# Patient Record
Sex: FEMALE | Race: WHITE | NOT HISPANIC OR LATINO | Employment: OTHER | URBAN - METROPOLITAN AREA
[De-identification: names, ages, dates, MRNs, and addresses within clinical notes are randomized per-mention and may not be internally consistent; named-entity substitution may affect disease eponyms.]

---

## 2021-01-01 ENCOUNTER — APPOINTMENT (OUTPATIENT)
Dept: GENERAL RADIOLOGY | Facility: HOSPITAL | Age: 56
End: 2021-01-01

## 2021-01-01 ENCOUNTER — HOSPITAL ENCOUNTER (INPATIENT)
Facility: HOSPITAL | Age: 56
LOS: 8 days | End: 2021-08-15
Attending: INTERNAL MEDICINE | Admitting: INTERNAL MEDICINE

## 2021-01-01 ENCOUNTER — APPOINTMENT (OUTPATIENT)
Dept: MRI IMAGING | Facility: HOSPITAL | Age: 56
End: 2021-01-01

## 2021-01-01 VITALS
WEIGHT: 145.1 LBS | HEIGHT: 65 IN | TEMPERATURE: 98.3 F | HEART RATE: 112 BPM | DIASTOLIC BLOOD PRESSURE: 72 MMHG | SYSTOLIC BLOOD PRESSURE: 133 MMHG | BODY MASS INDEX: 24.18 KG/M2 | RESPIRATION RATE: 20 BRPM | OXYGEN SATURATION: 94 %

## 2021-01-01 LAB
ALBUMIN SERPL-MCNC: 2.4 G/DL (ref 3.5–5.2)
ALBUMIN SERPL-MCNC: 2.6 G/DL (ref 3.5–5.2)
ALBUMIN SERPL-MCNC: 2.8 G/DL (ref 3.5–5.2)
ALBUMIN/GLOB SERPL: 0.9 G/DL
ALBUMIN/GLOB SERPL: 0.9 G/DL
ALP SERPL-CCNC: 67 U/L (ref 39–117)
ALP SERPL-CCNC: 68 U/L (ref 39–117)
ALT SERPL W P-5'-P-CCNC: 13 U/L (ref 1–33)
ALT SERPL W P-5'-P-CCNC: 20 U/L (ref 1–33)
AMPHET+METHAMPHET UR QL: NEGATIVE
AMPHETAMINES UR QL: NEGATIVE
ANION GAP SERPL CALCULATED.3IONS-SCNC: 0.7 MMOL/L (ref 5–15)
ANION GAP SERPL CALCULATED.3IONS-SCNC: 4 MMOL/L (ref 5–15)
ANION GAP SERPL CALCULATED.3IONS-SCNC: 4.1 MMOL/L (ref 5–15)
ANION GAP SERPL CALCULATED.3IONS-SCNC: 4.2 MMOL/L (ref 5–15)
ANION GAP SERPL CALCULATED.3IONS-SCNC: 4.2 MMOL/L (ref 5–15)
ARTERIAL PATENCY WRIST A: ABNORMAL
ARTERIAL PATENCY WRIST A: ABNORMAL
ARTERIAL PATENCY WRIST A: POSITIVE
AST SERPL-CCNC: 20 U/L (ref 1–32)
AST SERPL-CCNC: 41 U/L (ref 1–32)
ATMOSPHERIC PRESS: 739 MMHG
ATMOSPHERIC PRESS: 740 MMHG
ATMOSPHERIC PRESS: 741 MMHG
ATMOSPHERIC PRESS: 743 MMHG
B PARAPERT DNA SPEC QL NAA+PROBE: NOT DETECTED
B PERT DNA SPEC QL NAA+PROBE: NOT DETECTED
BACTERIA SPEC AEROBE CULT: NORMAL
BACTERIA SPEC AEROBE CULT: NORMAL
BACTERIA SPEC RESP CULT: NORMAL
BACTERIA SPEC RESP CULT: NORMAL
BARBITURATES UR QL SCN: NEGATIVE
BASE EXCESS BLDA CALC-SCNC: 12.7 MMOL/L (ref 0–2)
BASE EXCESS BLDA CALC-SCNC: 15.3 MMOL/L (ref 0–2)
BASE EXCESS BLDA CALC-SCNC: 5.9 MMOL/L (ref 0–2)
BASE EXCESS BLDA CALC-SCNC: 9.2 MMOL/L (ref 0–2)
BASE EXCESS BLDA CALC-SCNC: ABNORMAL MMOL/L
BASE EXCESS BLDV CALC-SCNC: 14.5 MMOL/L (ref 0–2)
BASOPHILS # BLD AUTO: 0.01 10*3/MM3 (ref 0–0.2)
BASOPHILS # BLD AUTO: 0.01 10*3/MM3 (ref 0–0.2)
BASOPHILS # BLD AUTO: 0.02 10*3/MM3 (ref 0–0.2)
BASOPHILS # BLD AUTO: 0.02 10*3/MM3 (ref 0–0.2)
BASOPHILS NFR BLD AUTO: 0.1 % (ref 0–1.5)
BASOPHILS NFR BLD AUTO: 0.1 % (ref 0–1.5)
BASOPHILS NFR BLD AUTO: 0.2 % (ref 0–1.5)
BASOPHILS NFR BLD AUTO: 0.2 % (ref 0–1.5)
BDY SITE: ABNORMAL
BENZODIAZ UR QL SCN: NEGATIVE
BILIRUB SERPL-MCNC: 0.2 MG/DL (ref 0–1.2)
BILIRUB SERPL-MCNC: 0.6 MG/DL (ref 0–1.2)
BODY TEMPERATURE: 37 C
BUN SERPL-MCNC: 10 MG/DL (ref 6–20)
BUN SERPL-MCNC: 13 MG/DL (ref 6–20)
BUN SERPL-MCNC: 20 MG/DL (ref 6–20)
BUN SERPL-MCNC: 23 MG/DL (ref 6–20)
BUN SERPL-MCNC: 7 MG/DL (ref 6–20)
BUN/CREAT SERPL: 16.7 (ref 7–25)
BUN/CREAT SERPL: 25 (ref 7–25)
BUN/CREAT SERPL: 25 (ref 7–25)
BUN/CREAT SERPL: 32.3 (ref 7–25)
BUN/CREAT SERPL: 32.4 (ref 7–25)
BUPRENORPHINE SERPL-MCNC: NEGATIVE NG/ML
C PNEUM DNA NPH QL NAA+NON-PROBE: NOT DETECTED
CALCIUM SPEC-SCNC: 7.7 MG/DL (ref 8.6–10.5)
CALCIUM SPEC-SCNC: 8.2 MG/DL (ref 8.6–10.5)
CALCIUM SPEC-SCNC: 8.4 MG/DL (ref 8.6–10.5)
CALCIUM SPEC-SCNC: 8.4 MG/DL (ref 8.6–10.5)
CALCIUM SPEC-SCNC: 8.6 MG/DL (ref 8.6–10.5)
CANNABINOIDS SERPL QL: NEGATIVE
CHLORIDE SERPL-SCNC: 100 MMOL/L (ref 98–107)
CHLORIDE SERPL-SCNC: 94 MMOL/L (ref 98–107)
CHLORIDE SERPL-SCNC: 94 MMOL/L (ref 98–107)
CHLORIDE SERPL-SCNC: 98 MMOL/L (ref 98–107)
CHLORIDE SERPL-SCNC: 99 MMOL/L (ref 98–107)
CO2 SERPL-SCNC: 31.8 MMOL/L (ref 22–29)
CO2 SERPL-SCNC: 33 MMOL/L (ref 22–29)
CO2 SERPL-SCNC: 34.8 MMOL/L (ref 22–29)
CO2 SERPL-SCNC: 38.9 MMOL/L (ref 22–29)
CO2 SERPL-SCNC: 40.3 MMOL/L (ref 22–29)
COCAINE UR QL: NEGATIVE
CREAT SERPL-MCNC: 0.4 MG/DL (ref 0.57–1)
CREAT SERPL-MCNC: 0.42 MG/DL (ref 0.57–1)
CREAT SERPL-MCNC: 0.52 MG/DL (ref 0.57–1)
CREAT SERPL-MCNC: 0.62 MG/DL (ref 0.57–1)
CREAT SERPL-MCNC: 0.71 MG/DL (ref 0.57–1)
DEPRECATED RDW RBC AUTO: 46.5 FL (ref 37–54)
DEPRECATED RDW RBC AUTO: 48.9 FL (ref 37–54)
DEPRECATED RDW RBC AUTO: 49.1 FL (ref 37–54)
DEPRECATED RDW RBC AUTO: 50.9 FL (ref 37–54)
EOSINOPHIL # BLD AUTO: 0 10*3/MM3 (ref 0–0.4)
EOSINOPHIL # BLD AUTO: 0.03 10*3/MM3 (ref 0–0.4)
EOSINOPHIL NFR BLD AUTO: 0 % (ref 0.3–6.2)
EOSINOPHIL NFR BLD AUTO: 0.3 % (ref 0.3–6.2)
ERYTHROCYTE [DISTWIDTH] IN BLOOD BY AUTOMATED COUNT: 12.8 % (ref 12.3–15.4)
ERYTHROCYTE [DISTWIDTH] IN BLOOD BY AUTOMATED COUNT: 12.9 % (ref 12.3–15.4)
ERYTHROCYTE [DISTWIDTH] IN BLOOD BY AUTOMATED COUNT: 13.2 % (ref 12.3–15.4)
ERYTHROCYTE [DISTWIDTH] IN BLOOD BY AUTOMATED COUNT: 13.2 % (ref 12.3–15.4)
FLUAV SUBTYP SPEC NAA+PROBE: NOT DETECTED
FLUBV RNA ISLT QL NAA+PROBE: NOT DETECTED
GAS FLOW AIRWAY: 1 LPM
GFR SERPL CREATININE-BSD FRML MDRD: 100 ML/MIN/1.73
GFR SERPL CREATININE-BSD FRML MDRD: 122 ML/MIN/1.73
GFR SERPL CREATININE-BSD FRML MDRD: 85 ML/MIN/1.73
GFR SERPL CREATININE-BSD FRML MDRD: >150 ML/MIN/1.73
GFR SERPL CREATININE-BSD FRML MDRD: >150 ML/MIN/1.73
GLOBULIN UR ELPH-MCNC: 2.9 GM/DL
GLOBULIN UR ELPH-MCNC: 3.1 GM/DL
GLUCOSE BLDC GLUCOMTR-MCNC: 139 MG/DL (ref 70–130)
GLUCOSE SERPL-MCNC: 100 MG/DL (ref 65–99)
GLUCOSE SERPL-MCNC: 106 MG/DL (ref 65–99)
GLUCOSE SERPL-MCNC: 133 MG/DL (ref 65–99)
GLUCOSE SERPL-MCNC: 150 MG/DL (ref 65–99)
GLUCOSE SERPL-MCNC: 99 MG/DL (ref 65–99)
GRAM STN SPEC: NORMAL
HADV DNA SPEC NAA+PROBE: NOT DETECTED
HBA1C MFR BLD: 5.7 % (ref 4.8–5.6)
HCO3 BLDA-SCNC: 33.9 MMOL/L (ref 20–26)
HCO3 BLDA-SCNC: 35.5 MMOL/L (ref 20–26)
HCO3 BLDA-SCNC: 40.5 MMOL/L (ref 20–26)
HCO3 BLDA-SCNC: 44 MMOL/L (ref 20–26)
HCO3 BLDA-SCNC: 45.7 MMOL/L (ref 20–26)
HCO3 BLDV-SCNC: 43.4 MMOL/L (ref 22–28)
HCOV 229E RNA SPEC QL NAA+PROBE: NOT DETECTED
HCOV HKU1 RNA SPEC QL NAA+PROBE: NOT DETECTED
HCOV NL63 RNA SPEC QL NAA+PROBE: NOT DETECTED
HCOV OC43 RNA SPEC QL NAA+PROBE: NOT DETECTED
HCT VFR BLD AUTO: 41.9 % (ref 34–46.6)
HCT VFR BLD AUTO: 42.9 % (ref 34–46.6)
HCT VFR BLD AUTO: 43.4 % (ref 34–46.6)
HCT VFR BLD AUTO: 47.2 % (ref 34–46.6)
HGB BLD-MCNC: 13 G/DL (ref 12–15.9)
HGB BLD-MCNC: 13.3 G/DL (ref 12–15.9)
HGB BLD-MCNC: 13.7 G/DL (ref 12–15.9)
HGB BLD-MCNC: 14.6 G/DL (ref 12–15.9)
HGB BLDA-MCNC: 12.7 G/DL (ref 13.5–17.5)
HGB BLDA-MCNC: 13.8 G/DL (ref 13.5–17.5)
HGB BLDA-MCNC: 13.9 G/DL (ref 13.5–17.5)
HGB BLDA-MCNC: 14.4 G/DL (ref 13.5–17.5)
HGB BLDA-MCNC: 14.6 G/DL (ref 13.5–17.5)
HGB BLDA-MCNC: ABNORMAL G/DL
HMPV RNA NPH QL NAA+NON-PROBE: NOT DETECTED
HPIV1 RNA SPEC QL NAA+PROBE: NOT DETECTED
HPIV2 RNA SPEC QL NAA+PROBE: NOT DETECTED
HPIV3 RNA NPH QL NAA+PROBE: NOT DETECTED
HPIV4 P GENE NPH QL NAA+PROBE: NOT DETECTED
IMM GRANULOCYTES # BLD AUTO: 0.04 10*3/MM3 (ref 0–0.05)
IMM GRANULOCYTES # BLD AUTO: 0.07 10*3/MM3 (ref 0–0.05)
IMM GRANULOCYTES # BLD AUTO: 0.07 10*3/MM3 (ref 0–0.05)
IMM GRANULOCYTES # BLD AUTO: 0.08 10*3/MM3 (ref 0–0.05)
IMM GRANULOCYTES NFR BLD AUTO: 0.3 % (ref 0–0.5)
IMM GRANULOCYTES NFR BLD AUTO: 0.6 % (ref 0–0.5)
IMM GRANULOCYTES NFR BLD AUTO: 0.6 % (ref 0–0.5)
IMM GRANULOCYTES NFR BLD AUTO: 0.8 % (ref 0–0.5)
INHALED O2 CONCENTRATION: 40 %
INHALED O2 CONCENTRATION: 45 %
INHALED O2 CONCENTRATION: 50 %
L PNEUMO1 AG UR QL IA: NEGATIVE
LYMPHOCYTES # BLD AUTO: 0.96 10*3/MM3 (ref 0.7–3.1)
LYMPHOCYTES # BLD AUTO: 0.96 10*3/MM3 (ref 0.7–3.1)
LYMPHOCYTES # BLD AUTO: 0.98 10*3/MM3 (ref 0.7–3.1)
LYMPHOCYTES # BLD AUTO: 1.16 10*3/MM3 (ref 0.7–3.1)
LYMPHOCYTES NFR BLD AUTO: 10.4 % (ref 19.6–45.3)
LYMPHOCYTES NFR BLD AUTO: 7.3 % (ref 19.6–45.3)
LYMPHOCYTES NFR BLD AUTO: 8.1 % (ref 19.6–45.3)
LYMPHOCYTES NFR BLD AUTO: 9.5 % (ref 19.6–45.3)
Lab: ABNORMAL
M PNEUMO IGG SER IA-ACNC: NOT DETECTED
MACROCYTES BLD QL SMEAR: NORMAL
MAGNESIUM SERPL-MCNC: 1.9 MG/DL (ref 1.6–2.6)
MAGNESIUM SERPL-MCNC: 2 MG/DL (ref 1.6–2.6)
MAGNESIUM SERPL-MCNC: 2 MG/DL (ref 1.6–2.6)
MCH RBC QN AUTO: 31.3 PG (ref 26.6–33)
MCH RBC QN AUTO: 31.7 PG (ref 26.6–33)
MCH RBC QN AUTO: 31.9 PG (ref 26.6–33)
MCH RBC QN AUTO: 31.9 PG (ref 26.6–33)
MCHC RBC AUTO-ENTMCNC: 30.3 G/DL (ref 31.5–35.7)
MCHC RBC AUTO-ENTMCNC: 30.9 G/DL (ref 31.5–35.7)
MCHC RBC AUTO-ENTMCNC: 31.6 G/DL (ref 31.5–35.7)
MCHC RBC AUTO-ENTMCNC: 31.7 G/DL (ref 31.5–35.7)
MCV RBC AUTO: 100.9 FL (ref 79–97)
MCV RBC AUTO: 101.1 FL (ref 79–97)
MCV RBC AUTO: 105.1 FL (ref 79–97)
MCV RBC AUTO: 99.8 FL (ref 79–97)
METHADONE UR QL SCN: NEGATIVE
MODALITY: ABNORMAL
MONOCYTES # BLD AUTO: 0.71 10*3/MM3 (ref 0.1–0.9)
MONOCYTES # BLD AUTO: 0.73 10*3/MM3 (ref 0.1–0.9)
MONOCYTES # BLD AUTO: 0.76 10*3/MM3 (ref 0.1–0.9)
MONOCYTES # BLD AUTO: 0.9 10*3/MM3 (ref 0.1–0.9)
MONOCYTES NFR BLD AUTO: 5.7 % (ref 5–12)
MONOCYTES NFR BLD AUTO: 6 % (ref 5–12)
MONOCYTES NFR BLD AUTO: 7.6 % (ref 5–12)
MONOCYTES NFR BLD AUTO: 7.7 % (ref 5–12)
MRSA SPEC QL CULT: NORMAL
NEUTROPHILS NFR BLD AUTO: 10.19 10*3/MM3 (ref 1.7–7)
NEUTROPHILS NFR BLD AUTO: 11.57 10*3/MM3 (ref 1.7–7)
NEUTROPHILS NFR BLD AUTO: 7.48 10*3/MM3 (ref 1.7–7)
NEUTROPHILS NFR BLD AUTO: 80.6 % (ref 42.7–76)
NEUTROPHILS NFR BLD AUTO: 83.8 % (ref 42.7–76)
NEUTROPHILS NFR BLD AUTO: 83.8 % (ref 42.7–76)
NEUTROPHILS NFR BLD AUTO: 86.3 % (ref 42.7–76)
NEUTROPHILS NFR BLD AUTO: 9.89 10*3/MM3 (ref 1.7–7)
NOTIFIED BY: ABNORMAL
NOTIFIED WHO: ABNORMAL
NRBC BLD AUTO-RTO: 0 /100 WBC (ref 0–0.2)
NT-PROBNP SERPL-MCNC: 228.2 PG/ML (ref 0–900)
OPIATES UR QL: NEGATIVE
OXYCODONE UR QL SCN: NEGATIVE
PCO2 BLDA: 59.2 MM HG (ref 35–45)
PCO2 BLDA: 74.4 MM HG (ref 35–45)
PCO2 BLDA: 75.2 MM HG (ref 35–45)
PCO2 BLDA: 94 MM HG (ref 35–45)
PCO2 BLDA: >102 MM HG (ref 35–45)
PCO2 BLDV: 73.8 MM HG (ref 41–51)
PCO2 TEMP ADJ BLD: 59.2 MM HG (ref 35–45)
PCO2 TEMP ADJ BLD: 74.4 MM HG (ref 35–45)
PCO2 TEMP ADJ BLD: 75.2 MM HG (ref 35–45)
PCO2 TEMP ADJ BLD: 94 MM HG (ref 35–45)
PCO2 TEMP ADJ BLD: >102 MM HG (ref 35–45)
PCP UR QL SCN: NEGATIVE
PEEP RESPIRATORY: 5 CM[H2O]
PH BLDA: 7.22 PH UNITS (ref 7.35–7.45)
PH BLDA: 7.24 PH UNITS (ref 7.35–7.45)
PH BLDA: 7.29 PH UNITS (ref 7.35–7.45)
PH BLDA: 7.37 PH UNITS (ref 7.35–7.45)
PH BLDA: 7.38 PH UNITS (ref 7.35–7.45)
PH BLDV: 7.38 PH UNITS (ref 7.32–7.42)
PH, TEMP CORRECTED: 7.22 PH UNITS (ref 7.35–7.45)
PH, TEMP CORRECTED: 7.24 PH UNITS (ref 7.35–7.45)
PH, TEMP CORRECTED: 7.29 PH UNITS (ref 7.35–7.45)
PH, TEMP CORRECTED: 7.37 PH UNITS (ref 7.35–7.45)
PH, TEMP CORRECTED: 7.38 PH UNITS (ref 7.35–7.45)
PHOSPHATE SERPL-MCNC: 2 MG/DL (ref 2.5–4.5)
PHOSPHATE SERPL-MCNC: 3.5 MG/DL (ref 2.5–4.5)
PLAT MORPH BLD: NORMAL
PLATELET # BLD AUTO: 195 10*3/MM3 (ref 140–450)
PLATELET # BLD AUTO: 203 10*3/MM3 (ref 140–450)
PLATELET # BLD AUTO: 222 10*3/MM3 (ref 140–450)
PLATELET # BLD AUTO: 223 10*3/MM3 (ref 140–450)
PMV BLD AUTO: 10 FL (ref 6–12)
PMV BLD AUTO: 9.8 FL (ref 6–12)
PMV BLD AUTO: 9.8 FL (ref 6–12)
PMV BLD AUTO: 9.9 FL (ref 6–12)
PO2 BLDA: 49.7 MM HG (ref 83–108)
PO2 BLDA: 71.6 MM HG (ref 83–108)
PO2 BLDA: 71.9 MM HG (ref 83–108)
PO2 BLDA: 77.2 MM HG (ref 83–108)
PO2 BLDA: 78.4 MM HG (ref 83–108)
PO2 BLDV: 69.8 MM HG (ref 27–53)
PO2 TEMP ADJ BLD: 49.7 MM HG (ref 83–108)
PO2 TEMP ADJ BLD: 71.6 MM HG (ref 83–108)
PO2 TEMP ADJ BLD: 71.9 MM HG (ref 83–108)
PO2 TEMP ADJ BLD: 77.2 MM HG (ref 83–108)
PO2 TEMP ADJ BLD: 78.4 MM HG (ref 83–108)
POTASSIUM SERPL-SCNC: 3.8 MMOL/L (ref 3.5–5.2)
POTASSIUM SERPL-SCNC: 4.3 MMOL/L (ref 3.5–5.2)
POTASSIUM SERPL-SCNC: 4.3 MMOL/L (ref 3.5–5.2)
POTASSIUM SERPL-SCNC: 4.7 MMOL/L (ref 3.5–5.2)
POTASSIUM SERPL-SCNC: 5.1 MMOL/L (ref 3.5–5.2)
PROCALCITONIN SERPL-MCNC: 0.02 NG/ML (ref 0–0.25)
PROCALCITONIN SERPL-MCNC: 0.03 NG/ML (ref 0–0.25)
PROPOXYPH UR QL: NEGATIVE
PROT SERPL-MCNC: 5.5 G/DL (ref 6–8.5)
PROT SERPL-MCNC: 5.9 G/DL (ref 6–8.5)
PSV: 10 CMH2O
QT INTERVAL: 341 MS
RBC # BLD AUTO: 4.08 10*6/MM3 (ref 3.77–5.28)
RBC # BLD AUTO: 4.2 10*6/MM3 (ref 3.77–5.28)
RBC # BLD AUTO: 4.3 10*6/MM3 (ref 3.77–5.28)
RBC # BLD AUTO: 4.67 10*6/MM3 (ref 3.77–5.28)
RHINOVIRUS RNA SPEC NAA+PROBE: NOT DETECTED
RSV RNA NPH QL NAA+NON-PROBE: NOT DETECTED
S PNEUM AG SPEC QL LA: NEGATIVE
SAO2 % BLDCOA: 83.3 % (ref 94–99)
SAO2 % BLDCOA: 93.4 % (ref 94–99)
SAO2 % BLDCOA: 95.1 % (ref 94–99)
SAO2 % BLDCOA: 95.1 % (ref 94–99)
SAO2 % BLDCOA: ABNORMAL %
SAO2 % BLDCOV: 95.1 % (ref 45–75)
SET MECH RESP RATE: 16
SET MECH RESP RATE: 22
SODIUM SERPL-SCNC: 135 MMOL/L (ref 136–145)
SODIUM SERPL-SCNC: 135 MMOL/L (ref 136–145)
SODIUM SERPL-SCNC: 136 MMOL/L (ref 136–145)
SODIUM SERPL-SCNC: 137 MMOL/L (ref 136–145)
SODIUM SERPL-SCNC: 138 MMOL/L (ref 136–145)
TRICYCLICS UR QL SCN: NEGATIVE
TSH SERPL DL<=0.05 MIU/L-ACNC: 0.28 UIU/ML (ref 0.27–4.2)
VANCOMYCIN SERPL-MCNC: 9.9 MCG/ML (ref 5–40)
VENTILATOR MODE: ABNORMAL
VENTILATOR MODE: AC
VENTILATOR MODE: AC
VT ON VENT VENT: 400 ML
WBC # BLD AUTO: 11.81 10*3/MM3 (ref 3.4–10.8)
WBC # BLD AUTO: 12.16 10*3/MM3 (ref 3.4–10.8)
WBC # BLD AUTO: 13.4 10*3/MM3 (ref 3.4–10.8)
WBC # BLD AUTO: 9.27 10*3/MM3 (ref 3.4–10.8)
WBC MORPH BLD: NORMAL

## 2021-01-01 PROCEDURE — 25010000002 VANCOMYCIN 750 MG RECONSTITUTED SOLUTION

## 2021-01-01 PROCEDURE — 94003 VENT MGMT INPAT SUBQ DAY: CPT

## 2021-01-01 PROCEDURE — 36600 WITHDRAWAL OF ARTERIAL BLOOD: CPT

## 2021-01-01 PROCEDURE — 99232 SBSQ HOSP IP/OBS MODERATE 35: CPT | Performed by: HOSPITALIST

## 2021-01-01 PROCEDURE — 25010000002 FENTANYL CITRATE (PF) 50 MCG/ML SOLUTION: Performed by: INTERNAL MEDICINE

## 2021-01-01 PROCEDURE — 83735 ASSAY OF MAGNESIUM: CPT | Performed by: INTERNAL MEDICINE

## 2021-01-01 PROCEDURE — 94799 UNLISTED PULMONARY SVC/PX: CPT

## 2021-01-01 PROCEDURE — 25010000002 CEFTRIAXONE SODIUM-DEXTROSE 1-3.74 GM-%(50ML) RECONSTITUTED SOLUTION: Performed by: INTERNAL MEDICINE

## 2021-01-01 PROCEDURE — 25010000002 LORAZEPAM PER 2 MG: Performed by: HOSPITALIST

## 2021-01-01 PROCEDURE — 25010000002 PROPOFOL 10 MG/ML EMULSION: Performed by: INTERNAL MEDICINE

## 2021-01-01 PROCEDURE — 80048 BASIC METABOLIC PNL TOTAL CA: CPT | Performed by: INTERNAL MEDICINE

## 2021-01-01 PROCEDURE — 87633 RESP VIRUS 12-25 TARGETS: CPT | Performed by: HOSPITALIST

## 2021-01-01 PROCEDURE — 82803 BLOOD GASES ANY COMBINATION: CPT

## 2021-01-01 PROCEDURE — 84145 PROCALCITONIN (PCT): CPT | Performed by: HOSPITALIST

## 2021-01-01 PROCEDURE — 87070 CULTURE OTHR SPECIMN AEROBIC: CPT | Performed by: INTERNAL MEDICINE

## 2021-01-01 PROCEDURE — 99291 CRITICAL CARE FIRST HOUR: CPT | Performed by: STUDENT IN AN ORGANIZED HEALTH CARE EDUCATION/TRAINING PROGRAM

## 2021-01-01 PROCEDURE — 71045 X-RAY EXAM CHEST 1 VIEW: CPT

## 2021-01-01 PROCEDURE — 25010000002 ENOXAPARIN PER 10 MG: Performed by: INTERNAL MEDICINE

## 2021-01-01 PROCEDURE — 85025 COMPLETE CBC W/AUTO DIFF WBC: CPT | Performed by: INTERNAL MEDICINE

## 2021-01-01 PROCEDURE — 92610 EVALUATE SWALLOWING FUNCTION: CPT

## 2021-01-01 PROCEDURE — 72141 MRI NECK SPINE W/O DYE: CPT

## 2021-01-01 PROCEDURE — 25010000002 HYDRALAZINE PER 20 MG

## 2021-01-01 PROCEDURE — 25010000002 CEFEPIME-DEXTROSE 2-5 GM-%(50ML) RECONSTITUTED SOLUTION: Performed by: HOSPITALIST

## 2021-01-01 PROCEDURE — 97110 THERAPEUTIC EXERCISES: CPT

## 2021-01-01 PROCEDURE — 87899 AGENT NOS ASSAY W/OPTIC: CPT | Performed by: HOSPITALIST

## 2021-01-01 PROCEDURE — 25010000002 AZITHROMYCIN PER 500 MG

## 2021-01-01 PROCEDURE — 85007 BL SMEAR W/DIFF WBC COUNT: CPT | Performed by: HOSPITALIST

## 2021-01-01 PROCEDURE — 99233 SBSQ HOSP IP/OBS HIGH 50: CPT | Performed by: INTERNAL MEDICINE

## 2021-01-01 PROCEDURE — 84100 ASSAY OF PHOSPHORUS: CPT | Performed by: STUDENT IN AN ORGANIZED HEALTH CARE EDUCATION/TRAINING PROGRAM

## 2021-01-01 PROCEDURE — 97161 PT EVAL LOW COMPLEX 20 MIN: CPT

## 2021-01-01 PROCEDURE — 83880 ASSAY OF NATRIURETIC PEPTIDE: CPT | Performed by: INTERNAL MEDICINE

## 2021-01-01 PROCEDURE — 80053 COMPREHEN METABOLIC PANEL: CPT | Performed by: HOSPITALIST

## 2021-01-01 PROCEDURE — 25010000002 LORAZEPAM PER 2 MG: Performed by: INTERNAL MEDICINE

## 2021-01-01 PROCEDURE — 99233 SBSQ HOSP IP/OBS HIGH 50: CPT | Performed by: HOSPITALIST

## 2021-01-01 PROCEDURE — 85025 COMPLETE CBC W/AUTO DIFF WBC: CPT | Performed by: HOSPITALIST

## 2021-01-01 PROCEDURE — 83036 HEMOGLOBIN GLYCOSYLATED A1C: CPT | Performed by: INTERNAL MEDICINE

## 2021-01-01 PROCEDURE — 82962 GLUCOSE BLOOD TEST: CPT

## 2021-01-01 PROCEDURE — 80069 RENAL FUNCTION PANEL: CPT | Performed by: HOSPITALIST

## 2021-01-01 PROCEDURE — 93010 ELECTROCARDIOGRAM REPORT: CPT | Performed by: INTERNAL MEDICINE

## 2021-01-01 PROCEDURE — 92526 ORAL FUNCTION THERAPY: CPT

## 2021-01-01 PROCEDURE — 70551 MRI BRAIN STEM W/O DYE: CPT

## 2021-01-01 PROCEDURE — 99238 HOSP IP/OBS DSCHRG MGMT 30/<: CPT | Performed by: INTERNAL MEDICINE

## 2021-01-01 PROCEDURE — 87205 SMEAR GRAM STAIN: CPT | Performed by: INTERNAL MEDICINE

## 2021-01-01 PROCEDURE — 25010000002 MORPHINE PER 10 MG: Performed by: HOSPITALIST

## 2021-01-01 PROCEDURE — 84443 ASSAY THYROID STIM HORMONE: CPT | Performed by: INTERNAL MEDICINE

## 2021-01-01 PROCEDURE — 87081 CULTURE SCREEN ONLY: CPT | Performed by: STUDENT IN AN ORGANIZED HEALTH CARE EDUCATION/TRAINING PROGRAM

## 2021-01-01 PROCEDURE — 87040 BLOOD CULTURE FOR BACTERIA: CPT | Performed by: INTERNAL MEDICINE

## 2021-01-01 PROCEDURE — 94660 CPAP INITIATION&MGMT: CPT

## 2021-01-01 PROCEDURE — 80202 ASSAY OF VANCOMYCIN: CPT | Performed by: STUDENT IN AN ORGANIZED HEALTH CARE EDUCATION/TRAINING PROGRAM

## 2021-01-01 PROCEDURE — 25010000002 PIPERACILLIN SOD-TAZOBACTAM PER 1 G: Performed by: STUDENT IN AN ORGANIZED HEALTH CARE EDUCATION/TRAINING PROGRAM

## 2021-01-01 PROCEDURE — 87070 CULTURE OTHR SPECIMN AEROBIC: CPT | Performed by: STUDENT IN AN ORGANIZED HEALTH CARE EDUCATION/TRAINING PROGRAM

## 2021-01-01 PROCEDURE — 99222 1ST HOSP IP/OBS MODERATE 55: CPT | Performed by: PSYCHIATRY & NEUROLOGY

## 2021-01-01 PROCEDURE — 5A1945Z RESPIRATORY VENTILATION, 24-96 CONSECUTIVE HOURS: ICD-10-PCS | Performed by: STUDENT IN AN ORGANIZED HEALTH CARE EDUCATION/TRAINING PROGRAM

## 2021-01-01 PROCEDURE — 94002 VENT MGMT INPAT INIT DAY: CPT

## 2021-01-01 PROCEDURE — 99233 SBSQ HOSP IP/OBS HIGH 50: CPT | Performed by: FAMILY MEDICINE

## 2021-01-01 PROCEDURE — 99231 SBSQ HOSP IP/OBS SF/LOW 25: CPT | Performed by: PSYCHIATRY & NEUROLOGY

## 2021-01-01 PROCEDURE — 80306 DRUG TEST PRSMV INSTRMNT: CPT | Performed by: HOSPITALIST

## 2021-01-01 PROCEDURE — 25010000002 VANCOMYCIN 1 G RECONSTITUTED SOLUTION: Performed by: STUDENT IN AN ORGANIZED HEALTH CARE EDUCATION/TRAINING PROGRAM

## 2021-01-01 PROCEDURE — 74018 RADEX ABDOMEN 1 VIEW: CPT

## 2021-01-01 PROCEDURE — 87205 SMEAR GRAM STAIN: CPT | Performed by: STUDENT IN AN ORGANIZED HEALTH CARE EDUCATION/TRAINING PROGRAM

## 2021-01-01 PROCEDURE — 94640 AIRWAY INHALATION TREATMENT: CPT

## 2021-01-01 PROCEDURE — 93005 ELECTROCARDIOGRAM TRACING: CPT | Performed by: STUDENT IN AN ORGANIZED HEALTH CARE EDUCATION/TRAINING PROGRAM

## 2021-01-01 RX ORDER — FENTANYL CITRATE 50 UG/ML
25 INJECTION, SOLUTION INTRAMUSCULAR; INTRAVENOUS
Status: DISCONTINUED | OUTPATIENT
Start: 2021-01-01 | End: 2021-01-01

## 2021-01-01 RX ORDER — CEFEPIME HYDROCHLORIDE 2 G/50ML
2 INJECTION, SOLUTION INTRAVENOUS EVERY 8 HOURS
Status: DISCONTINUED | OUTPATIENT
Start: 2021-01-01 | End: 2021-01-01 | Stop reason: HOSPADM

## 2021-01-01 RX ORDER — FENTANYL CITRATE 50 UG/ML
100 INJECTION, SOLUTION INTRAMUSCULAR; INTRAVENOUS
Status: DISCONTINUED | OUTPATIENT
Start: 2021-01-01 | End: 2021-01-01

## 2021-01-01 RX ORDER — SODIUM CHLORIDE 0.9 % (FLUSH) 0.9 %
10 SYRINGE (ML) INJECTION EVERY 12 HOURS SCHEDULED
Status: DISCONTINUED | OUTPATIENT
Start: 2021-01-01 | End: 2021-01-01 | Stop reason: HOSPADM

## 2021-01-01 RX ORDER — VANCOMYCIN HYDROCHLORIDE 500 MG/10ML
INJECTION, POWDER, LYOPHILIZED, FOR SOLUTION INTRAVENOUS
Status: COMPLETED
Start: 2021-01-01 | End: 2021-01-01

## 2021-01-01 RX ORDER — LISINOPRIL 20 MG/1
20 TABLET ORAL
Status: DISCONTINUED | OUTPATIENT
Start: 2021-01-01 | End: 2021-01-01 | Stop reason: HOSPADM

## 2021-01-01 RX ORDER — NICOTINE 21 MG/24HR
1 PATCH, TRANSDERMAL 24 HOURS TRANSDERMAL
Status: DISCONTINUED | OUTPATIENT
Start: 2021-01-01 | End: 2021-01-01 | Stop reason: HOSPADM

## 2021-01-01 RX ORDER — IPRATROPIUM BROMIDE AND ALBUTEROL SULFATE 2.5; .5 MG/3ML; MG/3ML
3 SOLUTION RESPIRATORY (INHALATION)
Status: DISCONTINUED | OUTPATIENT
Start: 2021-01-01 | End: 2021-01-01

## 2021-01-01 RX ORDER — METOPROLOL TARTRATE 50 MG/1
50 TABLET, FILM COATED ORAL 2 TIMES DAILY
COMMUNITY

## 2021-01-01 RX ORDER — MORPHINE SULFATE 2 MG/ML
1 INJECTION, SOLUTION INTRAMUSCULAR; INTRAVENOUS
Status: DISCONTINUED | OUTPATIENT
Start: 2021-01-01 | End: 2021-01-01 | Stop reason: HOSPADM

## 2021-01-01 RX ORDER — CHLORHEXIDINE GLUCONATE 0.12 MG/ML
15 RINSE ORAL EVERY 12 HOURS SCHEDULED
Status: DISCONTINUED | OUTPATIENT
Start: 2021-01-01 | End: 2021-01-01

## 2021-01-01 RX ORDER — CEFTRIAXONE 1 G/50ML
1 INJECTION, SOLUTION INTRAVENOUS EVERY 24 HOURS
Status: DISCONTINUED | OUTPATIENT
Start: 2021-01-01 | End: 2021-01-01

## 2021-01-01 RX ORDER — VANCOMYCIN HYDROCHLORIDE 750 MG/15ML
INJECTION, POWDER, LYOPHILIZED, FOR SOLUTION INTRAVENOUS
Status: COMPLETED
Start: 2021-01-01 | End: 2021-01-01

## 2021-01-01 RX ORDER — DEXMEDETOMIDINE HYDROCHLORIDE 4 UG/ML
.2-1.5 INJECTION, SOLUTION INTRAVENOUS
Status: DISCONTINUED | OUTPATIENT
Start: 2021-01-01 | End: 2021-01-01

## 2021-01-01 RX ORDER — BACLOFEN 10 MG/1
10 TABLET ORAL 3 TIMES DAILY
COMMUNITY

## 2021-01-01 RX ORDER — LABETALOL HYDROCHLORIDE 5 MG/ML
10 INJECTION, SOLUTION INTRAVENOUS ONCE
Status: COMPLETED | OUTPATIENT
Start: 2021-01-01 | End: 2021-01-01

## 2021-01-01 RX ORDER — HYDRALAZINE HYDROCHLORIDE 20 MG/ML
INJECTION INTRAMUSCULAR; INTRAVENOUS
Status: COMPLETED
Start: 2021-01-01 | End: 2021-01-01

## 2021-01-01 RX ORDER — AZITHROMYCIN 500 MG/1
INJECTION, POWDER, LYOPHILIZED, FOR SOLUTION INTRAVENOUS
Status: COMPLETED
Start: 2021-01-01 | End: 2021-01-01

## 2021-01-01 RX ORDER — PANTOPRAZOLE SODIUM 40 MG/1
40 TABLET, DELAYED RELEASE ORAL DAILY
COMMUNITY

## 2021-01-01 RX ORDER — ATORVASTATIN CALCIUM 40 MG/1
40 TABLET, FILM COATED ORAL DAILY
COMMUNITY

## 2021-01-01 RX ORDER — QUETIAPINE FUMARATE 100 MG/1
100 TABLET, FILM COATED ORAL NIGHTLY
COMMUNITY

## 2021-01-01 RX ORDER — LORAZEPAM 2 MG/ML
0.25 INJECTION INTRAMUSCULAR
Status: DISCONTINUED | OUTPATIENT
Start: 2021-01-01 | End: 2021-01-01 | Stop reason: HOSPADM

## 2021-01-01 RX ORDER — LORAZEPAM 2 MG/ML
1 INJECTION INTRAMUSCULAR EVERY 4 HOURS PRN
Status: DISCONTINUED | OUTPATIENT
Start: 2021-01-01 | End: 2021-01-01

## 2021-01-01 RX ORDER — QUETIAPINE FUMARATE 100 MG/1
100 TABLET, FILM COATED ORAL NIGHTLY
Status: DISCONTINUED | OUTPATIENT
Start: 2021-01-01 | End: 2021-01-01 | Stop reason: HOSPADM

## 2021-01-01 RX ORDER — NITROGLYCERIN 0.4 MG/1
0.4 TABLET SUBLINGUAL
Status: DISCONTINUED | OUTPATIENT
Start: 2021-01-01 | End: 2021-01-01 | Stop reason: SDUPTHER

## 2021-01-01 RX ORDER — CEFEPIME HYDROCHLORIDE 2 G/50ML
2 INJECTION, SOLUTION INTRAVENOUS ONCE
Status: COMPLETED | OUTPATIENT
Start: 2021-01-01 | End: 2021-01-01

## 2021-01-01 RX ORDER — MORPHINE SULFATE 2 MG/ML
1 INJECTION, SOLUTION INTRAMUSCULAR; INTRAVENOUS EVERY 4 HOURS PRN
Status: DISCONTINUED | OUTPATIENT
Start: 2021-01-01 | End: 2021-01-01

## 2021-01-01 RX ORDER — LORAZEPAM 2 MG/ML
0.5 INJECTION INTRAMUSCULAR EVERY 4 HOURS PRN
Status: DISCONTINUED | OUTPATIENT
Start: 2021-01-01 | End: 2021-01-01

## 2021-01-01 RX ORDER — CLONAZEPAM 0.5 MG/1
0.5 TABLET ORAL 2 TIMES DAILY PRN
Status: DISCONTINUED | OUTPATIENT
Start: 2021-01-01 | End: 2021-01-01

## 2021-01-01 RX ORDER — BUDESONIDE 0.5 MG/2ML
0.5 INHALANT ORAL
Status: DISCONTINUED | OUTPATIENT
Start: 2021-01-01 | End: 2021-01-01

## 2021-01-01 RX ORDER — DILTIAZEM HYDROCHLORIDE 120 MG/1
240 CAPSULE, COATED, EXTENDED RELEASE ORAL
Status: DISCONTINUED | OUTPATIENT
Start: 2021-01-01 | End: 2021-01-01 | Stop reason: HOSPADM

## 2021-01-01 RX ORDER — SODIUM CHLORIDE 0.9 % (FLUSH) 0.9 %
10 SYRINGE (ML) INJECTION AS NEEDED
Status: DISCONTINUED | OUTPATIENT
Start: 2021-01-01 | End: 2021-01-01 | Stop reason: HOSPADM

## 2021-01-01 RX ORDER — GLYCOPYRROLATE 0.2 MG/ML
0.1 INJECTION INTRAMUSCULAR; INTRAVENOUS EVERY 4 HOURS PRN
Status: DISCONTINUED | OUTPATIENT
Start: 2021-01-01 | End: 2021-01-01 | Stop reason: HOSPADM

## 2021-01-01 RX ORDER — PANTOPRAZOLE SODIUM 40 MG/10ML
40 INJECTION, POWDER, LYOPHILIZED, FOR SOLUTION INTRAVENOUS
Status: DISCONTINUED | OUTPATIENT
Start: 2021-01-01 | End: 2021-01-01

## 2021-01-01 RX ORDER — LISINOPRIL 20 MG/1
20 TABLET ORAL DAILY
COMMUNITY

## 2021-01-01 RX ORDER — NITROGLYCERIN 0.4 MG/1
0.4 TABLET SUBLINGUAL
Status: DISCONTINUED | OUTPATIENT
Start: 2021-01-01 | End: 2021-01-01

## 2021-01-01 RX ORDER — GLYCOPYRROLATE 0.2 MG/ML
0.1 INJECTION INTRAMUSCULAR; INTRAVENOUS ONCE
Status: COMPLETED | OUTPATIENT
Start: 2021-01-01 | End: 2021-01-01

## 2021-01-01 RX ORDER — LORAZEPAM 2 MG/ML
0.25 INJECTION INTRAMUSCULAR EVERY 4 HOURS PRN
Status: DISCONTINUED | OUTPATIENT
Start: 2021-01-01 | End: 2021-01-01

## 2021-01-01 RX ORDER — FLUTICASONE PROPIONATE 50 MCG
2 SPRAY, SUSPENSION (ML) NASAL DAILY
COMMUNITY

## 2021-01-01 RX ORDER — SODIUM CHLORIDE 9 MG/ML
40 INJECTION, SOLUTION INTRAVENOUS AS NEEDED
Status: DISCONTINUED | OUTPATIENT
Start: 2021-01-01 | End: 2021-01-01 | Stop reason: HOSPADM

## 2021-01-01 RX ORDER — HYDRALAZINE HYDROCHLORIDE 20 MG/ML
10 INJECTION INTRAMUSCULAR; INTRAVENOUS ONCE
Status: COMPLETED | OUTPATIENT
Start: 2021-01-01 | End: 2021-01-01

## 2021-01-01 RX ADMIN — PANTOPRAZOLE SODIUM 40 MG: 40 INJECTION, POWDER, FOR SOLUTION INTRAVENOUS at 09:39

## 2021-01-01 RX ADMIN — CEFEPIME HYDROCHLORIDE 2 G: 2 INJECTION, SOLUTION INTRAVENOUS at 16:37

## 2021-01-01 RX ADMIN — IPRATROPIUM BROMIDE AND ALBUTEROL SULFATE 3 ML: .5; 3 SOLUTION RESPIRATORY (INHALATION) at 13:09

## 2021-01-01 RX ADMIN — METRONIDAZOLE 500 MG: 500 INJECTION, SOLUTION INTRAVENOUS at 16:25

## 2021-01-01 RX ADMIN — IPRATROPIUM BROMIDE AND ALBUTEROL SULFATE 3 ML: .5; 3 SOLUTION RESPIRATORY (INHALATION) at 19:19

## 2021-01-01 RX ADMIN — SODIUM CHLORIDE 1000 MG: 900 INJECTION, SOLUTION INTRAVENOUS at 17:54

## 2021-01-01 RX ADMIN — PROPOFOL 35 MCG/KG/MIN: 10 INJECTION, EMULSION INTRAVENOUS at 11:54

## 2021-01-01 RX ADMIN — METRONIDAZOLE 500 MG: 500 INJECTION, SOLUTION INTRAVENOUS at 09:39

## 2021-01-01 RX ADMIN — SODIUM CHLORIDE, PRESERVATIVE FREE 10 ML: 5 INJECTION INTRAVENOUS at 08:03

## 2021-01-01 RX ADMIN — METRONIDAZOLE 500 MG: 500 INJECTION, SOLUTION INTRAVENOUS at 08:27

## 2021-01-01 RX ADMIN — MORPHINE SULFATE 1 MG: 2 INJECTION, SOLUTION INTRAMUSCULAR; INTRAVENOUS at 16:59

## 2021-01-01 RX ADMIN — CEFEPIME HYDROCHLORIDE 2 G: 2 INJECTION, SOLUTION INTRAVENOUS at 08:02

## 2021-01-01 RX ADMIN — SODIUM CHLORIDE, PRESERVATIVE FREE 10 ML: 5 INJECTION INTRAVENOUS at 23:13

## 2021-01-01 RX ADMIN — CEFEPIME HYDROCHLORIDE 2 G: 2 INJECTION, SOLUTION INTRAVENOUS at 09:36

## 2021-01-01 RX ADMIN — BUDESONIDE 0.5 MG: 0.5 SUSPENSION RESPIRATORY (INHALATION) at 11:34

## 2021-01-01 RX ADMIN — SODIUM CHLORIDE, PRESERVATIVE FREE 10 ML: 5 INJECTION INTRAVENOUS at 11:54

## 2021-01-01 RX ADMIN — BUDESONIDE 0.5 MG: 0.5 SUSPENSION RESPIRATORY (INHALATION) at 19:20

## 2021-01-01 RX ADMIN — CEFEPIME HYDROCHLORIDE 2 G: 2 INJECTION, SOLUTION INTRAVENOUS at 10:00

## 2021-01-01 RX ADMIN — DILTIAZEM HYDROCHLORIDE 240 MG: 120 CAPSULE, COATED, EXTENDED RELEASE ORAL at 08:27

## 2021-01-01 RX ADMIN — CHLORHEXIDINE GLUCONATE 0.12% ORAL RINSE 15 ML: 1.2 LIQUID ORAL at 20:09

## 2021-01-01 RX ADMIN — QUETIAPINE FUMARATE 100 MG: 100 TABLET ORAL at 20:57

## 2021-01-01 RX ADMIN — PANTOPRAZOLE SODIUM 40 MG: 40 INJECTION, POWDER, FOR SOLUTION INTRAVENOUS at 23:11

## 2021-01-01 RX ADMIN — LORAZEPAM 0.25 MG: 2 INJECTION INTRAMUSCULAR; INTRAVENOUS at 21:45

## 2021-01-01 RX ADMIN — PROPOFOL 50 MCG/KG/MIN: 10 INJECTION, EMULSION INTRAVENOUS at 19:23

## 2021-01-01 RX ADMIN — CEFEPIME HYDROCHLORIDE 2 G: 2 INJECTION, SOLUTION INTRAVENOUS at 08:28

## 2021-01-01 RX ADMIN — IPRATROPIUM BROMIDE AND ALBUTEROL SULFATE 3 ML: .5; 3 SOLUTION RESPIRATORY (INHALATION) at 07:24

## 2021-01-01 RX ADMIN — SODIUM CHLORIDE, PRESERVATIVE FREE 10 ML: 5 INJECTION INTRAVENOUS at 20:26

## 2021-01-01 RX ADMIN — FENTANYL CITRATE 100 MCG: 50 INJECTION, SOLUTION INTRAMUSCULAR; INTRAVENOUS at 04:09

## 2021-01-01 RX ADMIN — QUETIAPINE FUMARATE 100 MG: 100 TABLET ORAL at 20:32

## 2021-01-01 RX ADMIN — CEFEPIME HYDROCHLORIDE 2 G: 2 INJECTION, SOLUTION INTRAVENOUS at 00:08

## 2021-01-01 RX ADMIN — SODIUM CHLORIDE, PRESERVATIVE FREE 10 ML: 5 INJECTION INTRAVENOUS at 10:08

## 2021-01-01 RX ADMIN — ENOXAPARIN SODIUM 40 MG: 40 INJECTION SUBCUTANEOUS at 20:26

## 2021-01-01 RX ADMIN — CLONAZEPAM 0.5 MG: 0.5 TABLET ORAL at 20:32

## 2021-01-01 RX ADMIN — PROPOFOL 40 MCG/KG/MIN: 10 INJECTION, EMULSION INTRAVENOUS at 17:01

## 2021-01-01 RX ADMIN — PROPOFOL 50 MCG/KG/MIN: 10 INJECTION, EMULSION INTRAVENOUS at 14:22

## 2021-01-01 RX ADMIN — NICOTINE 1 PATCH: 21 PATCH, EXTENDED RELEASE TRANSDERMAL at 18:41

## 2021-01-01 RX ADMIN — SODIUM CHLORIDE, PRESERVATIVE FREE 10 ML: 5 INJECTION INTRAVENOUS at 20:33

## 2021-01-01 RX ADMIN — PROPOFOL 45 MCG/KG/MIN: 10 INJECTION, EMULSION INTRAVENOUS at 04:53

## 2021-01-01 RX ADMIN — PROPOFOL 40 MCG/KG/MIN: 10 INJECTION, EMULSION INTRAVENOUS at 04:14

## 2021-01-01 RX ADMIN — LORAZEPAM 0.5 MG: 2 INJECTION INTRAMUSCULAR; INTRAVENOUS at 12:01

## 2021-01-01 RX ADMIN — IPRATROPIUM BROMIDE AND ALBUTEROL SULFATE 3 ML: .5; 3 SOLUTION RESPIRATORY (INHALATION) at 07:10

## 2021-01-01 RX ADMIN — GLYCOPYRROLATE 0.1 MG: 0.2 INJECTION INTRAMUSCULAR; INTRAVENOUS at 15:30

## 2021-01-01 RX ADMIN — IPRATROPIUM BROMIDE AND ALBUTEROL SULFATE 3 ML: .5; 3 SOLUTION RESPIRATORY (INHALATION) at 01:21

## 2021-01-01 RX ADMIN — LISINOPRIL 20 MG: 10 TABLET ORAL at 10:00

## 2021-01-01 RX ADMIN — IPRATROPIUM BROMIDE AND ALBUTEROL SULFATE 3 ML: .5; 3 SOLUTION RESPIRATORY (INHALATION) at 19:05

## 2021-01-01 RX ADMIN — METRONIDAZOLE 500 MG: 500 INJECTION, SOLUTION INTRAVENOUS at 17:00

## 2021-01-01 RX ADMIN — CHLORHEXIDINE GLUCONATE 0.12% ORAL RINSE 15 ML: 1.2 LIQUID ORAL at 20:33

## 2021-01-01 RX ADMIN — VANCOMYCIN HYDROCHLORIDE 750 MG: 750 INJECTION, POWDER, LYOPHILIZED, FOR SOLUTION INTRAVENOUS at 01:51

## 2021-01-01 RX ADMIN — LORAZEPAM 0.25 MG: 2 INJECTION INTRAMUSCULAR; INTRAVENOUS at 18:25

## 2021-01-01 RX ADMIN — METRONIDAZOLE 500 MG: 500 INJECTION, SOLUTION INTRAVENOUS at 00:20

## 2021-01-01 RX ADMIN — METRONIDAZOLE 500 MG: 500 INJECTION, SOLUTION INTRAVENOUS at 23:56

## 2021-01-01 RX ADMIN — SODIUM CHLORIDE 1000 MG: 900 INJECTION, SOLUTION INTRAVENOUS at 02:38

## 2021-01-01 RX ADMIN — CLONAZEPAM 0.5 MG: 0.5 TABLET ORAL at 08:02

## 2021-01-01 RX ADMIN — METRONIDAZOLE 500 MG: 500 INJECTION, SOLUTION INTRAVENOUS at 08:31

## 2021-01-01 RX ADMIN — CHLORHEXIDINE GLUCONATE 0.12% ORAL RINSE 15 ML: 1.2 LIQUID ORAL at 09:05

## 2021-01-01 RX ADMIN — ENOXAPARIN SODIUM 40 MG: 40 INJECTION SUBCUTANEOUS at 20:57

## 2021-01-01 RX ADMIN — ENOXAPARIN SODIUM 40 MG: 40 INJECTION SUBCUTANEOUS at 20:55

## 2021-01-01 RX ADMIN — LABETALOL HYDROCHLORIDE 10 MG: 5 INJECTION, SOLUTION INTRAVENOUS at 04:37

## 2021-01-01 RX ADMIN — CLONAZEPAM 0.5 MG: 0.5 TABLET ORAL at 20:55

## 2021-01-01 RX ADMIN — SODIUM CHLORIDE, PRESERVATIVE FREE 10 ML: 5 INJECTION INTRAVENOUS at 20:58

## 2021-01-01 RX ADMIN — CHLORHEXIDINE GLUCONATE 0.12% ORAL RINSE 15 ML: 1.2 LIQUID ORAL at 08:31

## 2021-01-01 RX ADMIN — AZITHROMYCIN MONOHYDRATE 500 MG: 500 INJECTION, POWDER, LYOPHILIZED, FOR SOLUTION INTRAVENOUS at 23:56

## 2021-01-01 RX ADMIN — METRONIDAZOLE 500 MG: 500 INJECTION, SOLUTION INTRAVENOUS at 09:47

## 2021-01-01 RX ADMIN — CHLORHEXIDINE GLUCONATE 0.12% ORAL RINSE 15 ML: 1.2 LIQUID ORAL at 20:25

## 2021-01-01 RX ADMIN — SODIUM CHLORIDE, POTASSIUM CHLORIDE, SODIUM LACTATE AND CALCIUM CHLORIDE 500 ML: 600; 310; 30; 20 INJECTION, SOLUTION INTRAVENOUS at 18:16

## 2021-01-01 RX ADMIN — METRONIDAZOLE 500 MG: 500 INJECTION, SOLUTION INTRAVENOUS at 17:19

## 2021-01-01 RX ADMIN — PANTOPRAZOLE SODIUM 40 MG: 40 INJECTION, POWDER, FOR SOLUTION INTRAVENOUS at 08:30

## 2021-01-01 RX ADMIN — METRONIDAZOLE 500 MG: 500 INJECTION, SOLUTION INTRAVENOUS at 10:01

## 2021-01-01 RX ADMIN — DEXMEDETOMIDINE HYDROCHLORIDE 0.2 MCG/KG/HR: 400 INJECTION, SOLUTION INTRAVENOUS at 11:27

## 2021-01-01 RX ADMIN — IPRATROPIUM BROMIDE AND ALBUTEROL SULFATE 3 ML: .5; 3 SOLUTION RESPIRATORY (INHALATION) at 01:05

## 2021-01-01 RX ADMIN — CEFEPIME HYDROCHLORIDE 2 G: 2 INJECTION, SOLUTION INTRAVENOUS at 00:36

## 2021-01-01 RX ADMIN — SODIUM CHLORIDE, PRESERVATIVE FREE 10 ML: 5 INJECTION INTRAVENOUS at 22:27

## 2021-01-01 RX ADMIN — MORPHINE SULFATE 1 MG: 2 INJECTION, SOLUTION INTRAMUSCULAR; INTRAVENOUS at 14:22

## 2021-01-01 RX ADMIN — VANCOMYCIN HYDROCHLORIDE 750 MG: 750 INJECTION, POWDER, LYOPHILIZED, FOR SOLUTION INTRAVENOUS at 01:53

## 2021-01-01 RX ADMIN — GLYCOPYRROLATE 0.1 MG: 0.2 INJECTION INTRAMUSCULAR; INTRAVENOUS at 02:07

## 2021-01-01 RX ADMIN — CEFEPIME HYDROCHLORIDE 2 G: 2 INJECTION, SOLUTION INTRAVENOUS at 23:57

## 2021-01-01 RX ADMIN — METRONIDAZOLE 500 MG: 500 INJECTION, SOLUTION INTRAVENOUS at 16:45

## 2021-01-01 RX ADMIN — METRONIDAZOLE 500 MG: 500 INJECTION, SOLUTION INTRAVENOUS at 00:08

## 2021-01-01 RX ADMIN — PROPOFOL 36.67 MCG/KG/MIN: 10 INJECTION, EMULSION INTRAVENOUS at 22:55

## 2021-01-01 RX ADMIN — LORAZEPAM 0.25 MG: 2 INJECTION INTRAMUSCULAR; INTRAVENOUS at 00:56

## 2021-01-01 RX ADMIN — IPRATROPIUM BROMIDE AND ALBUTEROL SULFATE 3 ML: .5; 3 SOLUTION RESPIRATORY (INHALATION) at 12:03

## 2021-01-01 RX ADMIN — METRONIDAZOLE 500 MG: 500 INJECTION, SOLUTION INTRAVENOUS at 00:36

## 2021-01-01 RX ADMIN — LISINOPRIL 20 MG: 10 TABLET ORAL at 08:02

## 2021-01-01 RX ADMIN — SERTRALINE HYDROCHLORIDE 50 MG: 50 TABLET ORAL at 08:02

## 2021-01-01 RX ADMIN — IPRATROPIUM BROMIDE AND ALBUTEROL SULFATE 3 ML: .5; 3 SOLUTION RESPIRATORY (INHALATION) at 07:28

## 2021-01-01 RX ADMIN — HYDRALAZINE HYDROCHLORIDE 10 MG: 20 INJECTION INTRAMUSCULAR; INTRAVENOUS at 06:24

## 2021-01-01 RX ADMIN — DILTIAZEM HYDROCHLORIDE 240 MG: 120 CAPSULE, COATED, EXTENDED RELEASE ORAL at 10:00

## 2021-01-01 RX ADMIN — CEFEPIME HYDROCHLORIDE 2 G: 2 INJECTION, SOLUTION INTRAVENOUS at 16:11

## 2021-01-01 RX ADMIN — METRONIDAZOLE 500 MG: 500 INJECTION, SOLUTION INTRAVENOUS at 23:57

## 2021-01-01 RX ADMIN — METRONIDAZOLE 500 MG: 500 INJECTION, SOLUTION INTRAVENOUS at 08:02

## 2021-01-01 RX ADMIN — IPRATROPIUM BROMIDE AND ALBUTEROL SULFATE 3 ML: .5; 3 SOLUTION RESPIRATORY (INHALATION) at 19:24

## 2021-01-01 RX ADMIN — CEFEPIME HYDROCHLORIDE 2 G: 2 INJECTION, SOLUTION INTRAVENOUS at 16:50

## 2021-01-01 RX ADMIN — CLONAZEPAM 0.5 MG: 0.5 TABLET ORAL at 10:00

## 2021-01-01 RX ADMIN — BUDESONIDE 0.5 MG: 0.5 SUSPENSION RESPIRATORY (INHALATION) at 07:25

## 2021-01-01 RX ADMIN — ENOXAPARIN SODIUM 40 MG: 40 INJECTION SUBCUTANEOUS at 20:34

## 2021-01-01 RX ADMIN — CLONAZEPAM 0.5 MG: 0.5 TABLET ORAL at 08:33

## 2021-01-01 RX ADMIN — SODIUM CHLORIDE, PRESERVATIVE FREE 10 ML: 5 INJECTION INTRAVENOUS at 08:28

## 2021-01-01 RX ADMIN — ENOXAPARIN SODIUM 40 MG: 40 INJECTION SUBCUTANEOUS at 20:32

## 2021-01-01 RX ADMIN — PROPOFOL 50 MCG/KG/MIN: 10 INJECTION, EMULSION INTRAVENOUS at 01:07

## 2021-01-01 RX ADMIN — LISINOPRIL 20 MG: 10 TABLET ORAL at 08:27

## 2021-01-01 RX ADMIN — IPRATROPIUM BROMIDE AND ALBUTEROL SULFATE 3 ML: .5; 3 SOLUTION RESPIRATORY (INHALATION) at 01:35

## 2021-01-01 RX ADMIN — SODIUM CHLORIDE, PRESERVATIVE FREE 10 ML: 5 INJECTION INTRAVENOUS at 20:09

## 2021-01-01 RX ADMIN — FENTANYL CITRATE 100 MCG: 50 INJECTION, SOLUTION INTRAMUSCULAR; INTRAVENOUS at 19:25

## 2021-01-01 RX ADMIN — METRONIDAZOLE 500 MG: 500 INJECTION, SOLUTION INTRAVENOUS at 16:36

## 2021-01-01 RX ADMIN — SODIUM CHLORIDE, PRESERVATIVE FREE 10 ML: 5 INJECTION INTRAVENOUS at 14:24

## 2021-01-01 RX ADMIN — FENTANYL CITRATE 100 MCG: 50 INJECTION, SOLUTION INTRAMUSCULAR; INTRAVENOUS at 20:39

## 2021-01-01 RX ADMIN — SERTRALINE HYDROCHLORIDE 50 MG: 50 TABLET ORAL at 08:27

## 2021-01-01 RX ADMIN — CEFTRIAXONE 1 G: 1 INJECTION, SOLUTION INTRAVENOUS at 23:12

## 2021-01-01 RX ADMIN — CEFEPIME HYDROCHLORIDE 2 G: 2 INJECTION, SOLUTION INTRAVENOUS at 01:07

## 2021-01-01 RX ADMIN — MORPHINE SULFATE 1 MG: 2 INJECTION, SOLUTION INTRAMUSCULAR; INTRAVENOUS at 02:00

## 2021-01-01 RX ADMIN — METRONIDAZOLE 500 MG: 500 INJECTION, SOLUTION INTRAVENOUS at 00:07

## 2021-01-01 RX ADMIN — BUDESONIDE 0.5 MG: 0.5 SUSPENSION RESPIRATORY (INHALATION) at 19:19

## 2021-01-01 RX ADMIN — SERTRALINE HYDROCHLORIDE 50 MG: 50 TABLET ORAL at 10:00

## 2021-01-01 RX ADMIN — NICOTINE 1 PATCH: 21 PATCH, EXTENDED RELEASE TRANSDERMAL at 08:27

## 2021-01-01 RX ADMIN — CEFEPIME HYDROCHLORIDE 2 G: 2 INJECTION, SOLUTION INTRAVENOUS at 08:31

## 2021-01-01 RX ADMIN — PROPOFOL 50 MCG/KG/MIN: 10 INJECTION, EMULSION INTRAVENOUS at 09:08

## 2021-01-01 RX ADMIN — SODIUM CHLORIDE, PRESERVATIVE FREE 10 ML: 5 INJECTION INTRAVENOUS at 20:34

## 2021-01-01 RX ADMIN — IPRATROPIUM BROMIDE AND ALBUTEROL SULFATE 3 ML: .5; 3 SOLUTION RESPIRATORY (INHALATION) at 06:42

## 2021-01-01 RX ADMIN — LABETALOL HYDROCHLORIDE 10 MG: 5 INJECTION, SOLUTION INTRAVENOUS at 01:54

## 2021-01-01 RX ADMIN — PROPOFOL 5 MCG/KG/MIN: 10 INJECTION, EMULSION INTRAVENOUS at 20:21

## 2021-01-01 RX ADMIN — CEFEPIME HYDROCHLORIDE 2 G: 2 INJECTION, SOLUTION INTRAVENOUS at 17:19

## 2021-01-01 RX ADMIN — MORPHINE SULFATE 1 MG: 2 INJECTION, SOLUTION INTRAMUSCULAR; INTRAVENOUS at 22:49

## 2021-01-01 RX ADMIN — PIPERACILLIN AND TAZOBACTAM 4.5 G: 4; .5 INJECTION, POWDER, LYOPHILIZED, FOR SOLUTION INTRAVENOUS; PARENTERAL at 06:22

## 2021-01-01 RX ADMIN — QUETIAPINE FUMARATE 100 MG: 100 TABLET ORAL at 20:55

## 2021-01-01 RX ADMIN — CEFEPIME HYDROCHLORIDE 2 G: 2 INJECTION, SOLUTION INTRAVENOUS at 16:24

## 2021-01-01 RX ADMIN — LORAZEPAM 1 MG: 2 INJECTION INTRAMUSCULAR; INTRAVENOUS at 21:21

## 2021-01-01 RX ADMIN — CEFEPIME HYDROCHLORIDE 2 G: 2 INJECTION, SOLUTION INTRAVENOUS at 09:06

## 2021-01-01 RX ADMIN — METRONIDAZOLE 500 MG: 500 INJECTION, SOLUTION INTRAVENOUS at 16:50

## 2021-01-01 RX ADMIN — IPRATROPIUM BROMIDE AND ALBUTEROL SULFATE 3 ML: .5; 3 SOLUTION RESPIRATORY (INHALATION) at 19:20

## 2021-01-01 RX ADMIN — CHLORHEXIDINE GLUCONATE 0.12% ORAL RINSE 15 ML: 1.2 LIQUID ORAL at 23:12

## 2021-01-01 RX ADMIN — MORPHINE SULFATE 1 MG: 2 INJECTION, SOLUTION INTRAMUSCULAR; INTRAVENOUS at 20:26

## 2021-01-01 RX ADMIN — LORAZEPAM 0.5 MG: 2 INJECTION INTRAMUSCULAR; INTRAVENOUS at 13:02

## 2021-01-01 RX ADMIN — PANTOPRAZOLE SODIUM 40 MG: 40 INJECTION, POWDER, FOR SOLUTION INTRAVENOUS at 09:05

## 2021-01-01 RX ADMIN — FENTANYL CITRATE 100 MCG: 50 INJECTION, SOLUTION INTRAMUSCULAR; INTRAVENOUS at 01:06

## 2021-01-01 RX ADMIN — FENTANYL CITRATE 50 MCG: 50 INJECTION, SOLUTION INTRAMUSCULAR; INTRAVENOUS at 09:21

## 2021-01-01 RX ADMIN — LORAZEPAM 0.25 MG: 2 INJECTION INTRAMUSCULAR; INTRAVENOUS at 14:22

## 2021-01-01 RX ADMIN — IPRATROPIUM BROMIDE AND ALBUTEROL SULFATE 3 ML: .5; 3 SOLUTION RESPIRATORY (INHALATION) at 00:51

## 2021-01-01 RX ADMIN — FENTANYL CITRATE 100 MCG: 50 INJECTION, SOLUTION INTRAMUSCULAR; INTRAVENOUS at 06:09

## 2021-01-01 RX ADMIN — DILTIAZEM HYDROCHLORIDE 240 MG: 120 CAPSULE, COATED, EXTENDED RELEASE ORAL at 08:03

## 2021-01-01 RX ADMIN — ENOXAPARIN SODIUM 40 MG: 40 INJECTION SUBCUTANEOUS at 20:08

## 2021-01-01 RX ADMIN — CEFEPIME HYDROCHLORIDE 2 G: 2 INJECTION, SOLUTION INTRAVENOUS at 00:20

## 2021-01-01 RX ADMIN — SODIUM CHLORIDE, PRESERVATIVE FREE 10 ML: 5 INJECTION INTRAVENOUS at 09:45

## 2021-01-01 RX ADMIN — NICOTINE 1 PATCH: 21 PATCH, EXTENDED RELEASE TRANSDERMAL at 08:04

## 2021-01-01 RX ADMIN — SODIUM CHLORIDE, PRESERVATIVE FREE 10 ML: 5 INJECTION INTRAVENOUS at 09:06

## 2021-01-01 RX ADMIN — IPRATROPIUM BROMIDE AND ALBUTEROL SULFATE 3 ML: .5; 3 SOLUTION RESPIRATORY (INHALATION) at 11:45

## 2021-01-01 RX ADMIN — ENOXAPARIN SODIUM 40 MG: 40 INJECTION SUBCUTANEOUS at 23:12

## 2021-01-01 RX ADMIN — CEFEPIME HYDROCHLORIDE 2 G: 2 INJECTION, SOLUTION INTRAVENOUS at 17:00

## 2021-08-07 PROBLEM — J96.91 RESPIRATORY FAILURE WITH HYPOXIA (HCC): Status: ACTIVE | Noted: 2021-01-01

## 2021-08-08 NOTE — PROGRESS NOTES
"Hospitalist Team      Patient Care Team:  Sayda Burnett MD as PCP - General      Chief Complaint:  Follow-up Acute H/H Resp Failure    Subjective    No acute events overnight.  Patient's daughter reported to nurse that her mother has been having trouble moving her right side.  No work-up has been pursued to my understanding.    Objective    Vital Signs  Temp:  [96.5 °F (35.8 °C)-98.7 °F (37.1 °C)] 98.7 °F (37.1 °C)  Heart Rate:  [100-123] 123  Resp:  [12-20] 18  BP: (113-143)/() 120/79  FiO2 (%):  [50 %-75 %] 50 %  Oxygen Therapy  SpO2: 94 %  Pulse Oximetry Type: Continuous  Device (Oxygen Therapy): ventilator  Oxygen Concentration (%): 50  ETCO2 (mmHg): 55 mmHg}  Flowsheet Rows      First Filed Value   Admission Height  165.1 cm (65\") Documented at 08/07/2021 2000   Admission Weight  65.1 kg (143 lb 9.6 oz) Documented at 08/07/2021 2000        Physical Exam:    General: Appears much older than stated age.  Well nourished.  HEENT: PERRL.  No conjunctival injection.  ETT fixed at the 25cm at the lip.  Lungs: Breath sounds are diminished throughout all fields.  No accessory use noted.  CV: Regular rate and rhythm.  No murmur appreciated.  Radial pulses are 2+ and symmetric.  Abdomen: Soft w/ active bowel sounds.  MSK: No C/C/E.  No asymmetry.  Skin:  No rash.  Psych: Intubated and sedated.    Results Review:     I reviewed the patient's new clinical results.    Lab Results (last 24 hours)     Procedure Component Value Units Date/Time    Respiratory Culture - Aspirate, ET Suction [[id number]] Collected: 08/08/21 0438    Specimen: Aspirate from ET Suction Updated: 08/08/21 0741     Gram Stain Many (4+) WBCs per low power field      Rare (1+) Gram positive cocci in pairs      Rare (1+) Normal respiratory ena    Blood Gas, Arterial - [[id number]]  (Abnormal) Collected: 08/08/21 0584    Specimen: Arterial Blood Updated: 08/08/21 0553     Site Right Brachial     Kris's Test N/A     pH, Arterial 7.287 pH " units      Comment: 84 Value below reference range        pCO2, Arterial 74.4 mm Hg      Comment: 86 Value above critical limit        pO2, Arterial 71.9 mm Hg      Comment: 84 Value below reference range        HCO3, Arterial 35.5 mmol/L      Comment: 83 Value above reference range        Base Excess, Arterial 5.9 mmol/L      Comment: 83 Value above reference range        O2 Saturation, Arterial 93.4 %      Comment: 84 Value below reference range        Hemoglobin, Blood Gas 14.6 g/dL      Temperature 37.0 C      Barometric Pressure for Blood Gas 740 mmHg      Modality Ventilator     FIO2 50 %      Ventilator Mode Volume Support     Set Tidal Volume 400     Set Mech Resp Rate 16.0     PEEP 5.0     Notified Who RB AND V DR ENNIS     Notified By [id number]     Notified Time 08/08/2021 05:54     Collected by [id number]     Comment: Meter: [id number]     :  [id number]        pCO2, Temperature Corrected 74.4 mm Hg      pH, Temp Corrected 7.287 pH Units      pO2, Temperature Corrected 71.9 mm Hg     Phosphorus [[id number]]  (Normal) Collected: 08/08/21 0427    Specimen: Blood Updated: 08/08/21 0549     Phosphorus 3.5 mg/dL     Basic Metabolic Panel [[id number]]  (Abnormal) Collected: 08/08/21 0427    Specimen: Blood Updated: 08/08/21 0500     Glucose 99 mg/dL      BUN 20 mg/dL      Creatinine 0.62 mg/dL      Sodium 135 mmol/L      Potassium 5.1 mmol/L      Comment: Slight hemolysis detected by analyzer. Results may be affected.        Chloride 98 mmol/L      CO2 33.0 mmol/L      Calcium 8.6 mg/dL      eGFR Non African Amer 100 mL/min/1.73      BUN/Creatinine Ratio 32.3     Anion Gap 4.0 mmol/L     Narrative:      GFR Normal >60  Chronic Kidney Disease <60  Kidney Failure <15      Magnesium [[id number]]  (Normal) Collected: 08/08/21 0427    Specimen: Blood Updated: 08/08/21 0454     Magnesium 2.0 mg/dL     CBC & Differential [[id number]]  (Abnormal) Collected: 08/08/21 0427    Specimen: Blood Updated: 08/08/21 0437     Narrative:      The following orders were created for panel order CBC & Differential.  Procedure                               Abnormality         Status                     ---------                               -----------         ------                     CBC Auto Differential[[id number]]        Abnormal            Final result                 Please view results for these tests on the individual orders.    CBC Auto Differential [[id number]]  (Abnormal) Collected: 08/08/21 0427    Specimen: Blood Updated: 08/08/21 0437     WBC 12.16 10*3/mm3      RBC 4.67 10*6/mm3      Hemoglobin 14.6 g/dL      Hematocrit 47.2 %      .1 fL      MCH 31.3 pg      MCHC 30.9 g/dL      RDW 13.2 %      RDW-SD 49.1 fl      MPV 10.0 fL      Platelets 222 10*3/mm3      Neutrophil % 83.8 %      Lymphocyte % 9.5 %      Monocyte % 6.0 %      Eosinophil % 0.0 %      Basophil % 0.1 %      Immature Grans % 0.6 %      Neutrophils, Absolute 10.19 10*3/mm3      Lymphocytes, Absolute 1.16 10*3/mm3      Monocytes, Absolute 0.73 10*3/mm3      Eosinophils, Absolute 0.00 10*3/mm3      Basophils, Absolute 0.01 10*3/mm3      Immature Grans, Absolute 0.07 10*3/mm3      nRBC 0.0 /100 WBC     MRSA Screen Culture (Outpatient) - Swab, Nares [[id number]] Collected: 08/08/21 0150    Specimen: Swab from Nares Updated: 08/08/21 0207    Blood Culture - Blood, Arm, Left [[id number]] Collected: 08/07/21 2243    Specimen: Blood from Arm, Left Updated: 08/07/21 2305    TSH [[id number]]  (Normal) Collected: 08/07/21 2052    Specimen: Blood Updated: 08/07/21 2138     TSH 0.278 uIU/mL     Hemoglobin A1c [[id number]]  (Abnormal) Collected: 08/07/21 2052    Specimen: Blood Updated: 08/07/21 2132     Hemoglobin A1C 5.70 %     Narrative:      Hemoglobin A1C Ranges:    Increased Risk for Diabetes  5.7% to 6.4%  Diabetes                     >= 6.5%  Diabetic Goal                < 7.0%    Basic Metabolic Panel [[id number]]  (Abnormal) Collected: 08/07/21 2052    Specimen:  Blood Updated: 08/07/21 2126     Glucose 100 mg/dL      BUN 23 mg/dL      Creatinine 0.71 mg/dL      Sodium 136 mmol/L      Potassium 4.3 mmol/L      Chloride 100 mmol/L      CO2 31.8 mmol/L      Calcium 7.7 mg/dL      eGFR Non African Amer 85 mL/min/1.73      BUN/Creatinine Ratio 32.4     Anion Gap 4.2 mmol/L     Narrative:      GFR Normal >60  Chronic Kidney Disease <60  Kidney Failure <15      Magnesium [[id number]]  (Normal) Collected: 08/07/21 2052    Specimen: Blood Updated: 08/07/21 2126     Magnesium 1.9 mg/dL     Blood Culture - Blood, Arm, Left [[id number]] Collected: 08/07/21 2054    Specimen: Blood from Arm, Left Updated: 08/07/21 2110    CBC Auto Differential [[id number]]  (Abnormal) Collected: 08/07/21 2053    Specimen: Blood Updated: 08/07/21 2104     WBC 11.81 10*3/mm3      RBC 4.30 10*6/mm3      Hemoglobin 13.7 g/dL      Hematocrit 43.4 %      .9 fL      MCH 31.9 pg      MCHC 31.6 g/dL      RDW 13.2 %      RDW-SD 48.9 fl      MPV 9.9 fL      Platelets 195 10*3/mm3      Neutrophil % 83.8 %      Lymphocyte % 8.1 %      Monocyte % 7.6 %      Eosinophil % 0.0 %      Basophil % 0.2 %      Immature Grans % 0.3 %      Neutrophils, Absolute 9.89 10*3/mm3      Lymphocytes, Absolute 0.96 10*3/mm3      Monocytes, Absolute 0.90 10*3/mm3      Eosinophils, Absolute 0.00 10*3/mm3      Basophils, Absolute 0.02 10*3/mm3      Immature Grans, Absolute 0.04 10*3/mm3      nRBC 0.0 /100 WBC           Imaging Results (Last 24 Hours)     Procedure Component Value Units Date/Time    XR Chest 1 View [[id number]] Collected: 08/07/21 2158     Updated: 08/07/21 2202    Narrative:      CR Chest 1 Vw    INDICATION:   Endotracheal tube placement     COMPARISON:    None available.    FINDINGS:  Single portable AP view(s) of the chest.    The tip of the endotracheal tube is approximately 5 cm above the waldo. This is in satisfactory position.    The heart and mediastinal contours are normal.    The lungs demonstrate patchy  airspace disease most prominent in the right lung base. No obvious pleural fluid. No pneumothorax.     IMPRESSION:  Endotracheal tube in satisfactory position.    Patchy bilateral airspace disease most prominent in the right lung base.    Signer Name: Cole Mcconnell MD   Signed: 8/7/2021 9:58 PM   Workstation Name: [id number]    [facility]          Medication Review:   I have reviewed the patient's current medication list    Current Facility-Administered Medications:   •  cefepime (MAXIPIME) IVPB 2 g/50ml D5W (premix), 2 g, Intravenous, Once, Tra Diaz MD  •  cefepime (MAXIPIME) IVPB 2 g/50ml D5W (premix), 2 g, Intravenous, Q8H, Tra Diaz MD  •  chlorhexidine (PERIDEX) 0.12 % solution 15 mL, 15 mL, Mouth/Throat, Q12H, Jhon Mcgill DO, 15 mL at 08/07/21 2312  •  enoxaparin (LOVENOX) syringe 40 mg, 40 mg, Subcutaneous, Nightly, Jhon Mcgill DO, 40 mg at 08/07/21 2312  •  fentaNYL citrate (PF) (SUBLIMAZE) injection 25 mcg, 25 mcg, Intravenous, Q30 Min PRN, Jhon Mcgill DO  •  metroNIDAZOLE (FLAGYL) 500 mg/100mL IVPB, 500 mg, Intravenous, Q8H, Tra Diaz MD  •  nitroglycerin (NITROSTAT) SL tablet 0.4 mg, 0.4 mg, Sublingual, Q5 Min PRN, Jhon Mcgill DO  •  pantoprazole (PROTONIX) injection 40 mg, 40 mg, Intravenous, Q24H, Jhon Mcgill DO, 40 mg at 08/07/21 2311  •  Pharmacy to dose vancomycin, , Does not apply, Continuous PRN, Tiffanie Gomez MD  •  propofol (DIPRIVAN) infusion 10 mg/mL 100 mL, 5-50 mcg/kg/min (Order-Specific), Intravenous, Titrated, Jhon Mcgill DO, Last Rate: 7.2 mL/hr at 08/08/21 0515, 15 mcg/kg/min at 08/08/21 0515  •  sodium chloride 0.9 % flush 10 mL, 10 mL, Intravenous, PRN, Jhon Mcgill,   •  sodium chloride 0.9 % flush 10 mL, 10 mL, Intravenous, Q12H, Jhon Mcgill DO, 10 mL at 08/07/21 7806  •  sodium chloride 0.9 % infusion 40 mL, 40 mL, Intravenous, PRN, Jhon Mcgill  R, DO  •  vancomycin (VANCOCIN) 1,250 mg in sodium chloride 0.9 % 250 mL IVPB, 20 mg/kg, Intravenous, Once, Tiffanie Gomez MD, Currently Infusing at 08/08/21 0154      Assessment/Plan     1.  Acute H/H Respiratory Failure: Etiology unclear, but suspect pneumonia.  Will add urinary antigens.  Changed antibiotic strategy to mitigate renal injury.  Will conitnue to follow.    2.  ?Hx/O CVA: I reviewed the notes from her stay at [facility] in June of this year.  It appears she did not suffer an acute stroke.  Symptoms were felt to be due to Complex Migraine versus Chronic Pain.  Apparently, she has been having symptoms since May 12th of this year.  I've added a drug screen with a reported history of meth use.    3.  Hypertension: At goal on exam.  Continue to monitor trend.    Plan for disposition: Predicated on hospital course.    Tra Diaz MD  08/08/21  08:30 EDT

## 2021-08-08 NOTE — H&P
[facility]   HISTORY AND PHYSICAL    Patient Name: Flores Marie  : 1965  MRN: [id number]  Primary Care Physician:  Sayda Burnett MD  Date of admission: 2021    Subjective   Subjective     Chief Complaint: confusion and weakness    * history obtained by daughter Corrine at bedside.    57 y/o F with hx of CVA in April-2021, prior hx of substance abuse, chronic cervicalgia, prior EtOH abuse, was taken to [facility] ED earlier today by daughter after she received a phone call from patient's boyfriend stating that she was not in her usual state of health and had poor oral intake, he claimed last time patient had eaten was 2-3 days ago. When patient's daughter arrived to patient, (last seen ), patient was found to be altered, only oriented to self, musky gray skin color and she was breathless. Upon ER arrival, patient;s daughter and NOK was informed about her rapid decline in respiratory distress and blood pressure and proceeded to be endotracheally intubated.   Per patient's daughter patient has cut down in tobacco use due to worsening respiratory distress and also stopped drinking ETOH and prior use of methamphetamines.       Review of Systems   Unable to perform ROS: Acuity of condition        Personal History     No past medical history on file.    No past surgical history on file.    Family History: family history is not on file. Otherwise pertinent FHx was reviewed and not pertinent to current issue.    Social History:      Home Medications:  QUEtiapine, atorvastatin, baclofen, fluticasone, lisinopril, metoprolol tartrate, and pantoprazole    Allergies:  Allergies   Allergen Reactions   • Contrast Dye Hives, Nausea And Vomiting and Swelling   • Gabapentin Swelling   • Naproxen Shortness Of Breath   • Nsaids Swelling   • Atorvastatin Swelling     Patient states she was given atorvastatin in the hospital and she had throat irritation and swelling  Patient states she  was given atorvastatin in the hospital and she had throat irritation and swelling     • Cyclobenzaprine Unknown - Low Severity   • Iodinated Diagnostic Agents Rash       Objective    Objective     Vitals:   Temp:  [96.5 °F (35.8 °C)] 96.5 °F (35.8 °C)  Heart Rate:  [112] 112  Resp:  [12] 12  BP: (143)/(103) 143/103  FiO2 (%):  [50 %-75 %] 50 %    Physical Exam  Vitals and nursing note reviewed.   Constitutional:       General: She is not in acute distress.     Appearance: She is not ill-appearing, toxic-appearing or diaphoretic.   HENT:      Head: Normocephalic and atraumatic.      Right Ear: External ear normal.      Left Ear: External ear normal.      Nose: Nose normal.      Mouth/Throat:      Mouth: Mucous membranes are moist.   Eyes:      Conjunctiva/sclera: Conjunctivae normal.      Pupils: Pupils are equal, round, and reactive to light.   Cardiovascular:      Rate and Rhythm: Normal rate and regular rhythm.      Pulses: Normal pulses.      Heart sounds: Normal heart sounds. No murmur heard.   No friction rub. No gallop.    Pulmonary:      Effort: Pulmonary effort is normal. No respiratory distress.      Breath sounds: No stridor. Rhonchi present. No wheezing or rales.   Chest:      Chest wall: No tenderness.   Abdominal:      General: Abdomen is flat. There is no distension.      Tenderness: There is no abdominal tenderness.   Musculoskeletal:         General: No swelling or tenderness.      Cervical back: Neck supple. No rigidity or tenderness.      Right lower leg: No edema.      Left lower leg: No edema.   Skin:     General: Skin is warm.   Neurological:      General: No focal deficit present.         Result Review    Result Review:  I have personally reviewed the results from the time of this admission to 8/7/2021 23:31 EDT and agree with these findings:  []  Laboratory  []  Microbiology  []  Radiology  []  EKG/Telemetry   []  Cardiology/Vascular   []  Pathology  []  Old records  []  Other:  Most notable  findings include:   Outside hospital records- Utox negative, Na 133, K 5.0 CO2 37, SrCr 1.03, BUN 34, AST 26, ALT 22  WBC 14.87, Hb 17.81, plt 299  EKG sinus tachycardia   COVID-19 negative      Assessment/Plan   Assessment / Plan     Brief Patient Summary:  Flores Marie is a 56 y.o. female who is transferred from outside facility for Acute hypoxic hypercapnic respiratory failure.     Active Hospital Problems:  Active Hospital Problems    Diagnosis    • Respiratory failure with hypoxia (CMS/HCC)      Plan:   Acute hypoxic hypercapnic respiratory failure  CAP vs aspiration pneumonia  Acute encephalopathy with prior neuro history that is undiagnosed  Hx of CVA?, remote  Tobacco abuse disorder  Hx of substance abuse    -continue vent management, currently on ACVC 400/20/50/5, sats 95%, ABG acceptable  -wean FiO2 as tolerated to maintain sats >90%  -duonebs PRN q6h   -will switch to vancomycin + zosyn given high risk factors due to multiple admissions and visits to ED  -On sedation with propofol, daily awakening trials when indicated followed by SBT  -f/u routine labs  -obtain outside records for brain imaging    60 minutes of critical care time spent during this encounter    DVT prophylaxis:  Medical DVT prophylaxis orders are present.    CODE STATUS:    Level Of Support Discussed With: Next of Kin (If No Surrogate)  Code Status: CPR  Medical Interventions (Level of Support Prior to Arrest): Full    Admission Status:  I believe this patient meets ICU status.    Electronically signed by Tiffanie Teran MD, 08/07/21, 8:54 PM EDT.

## 2021-08-08 NOTE — CASE MANAGEMENT/SOCIAL WORK
Discharge Planning Assessment   Leandra Oliver     Patient Name: Flores Marie  MRN: [id number]  Today's Date: 8/8/2021    Admit Date: 8/7/2021    Discharge Needs Assessment     Row Name 08/08/21 1527       Living Environment    Lives With  significant other    Name(s) of Who Lives With Patient  Truong    Current Living Arrangements  home/apartment/condo    Duration at Residence  3 years    Potentially Unsafe Housing Conditions  -- none verbalized    Primary Care Provided by  self;spouse/significant other    Provides Primary Care For  no one    Family Caregiver if Needed  significant other;child(joe), adult    Family Caregiver Names  Truong, Corrine or Fadia    Quality of Family Relationships  supportive       Resource/Environmental Concerns    Resource/Environmental Concerns  none    Transportation Concerns  car, none       Transition Planning    Patient/Family Anticipates Transition to  long-term care facility;inpatient rehabilitation facility    Patient/Family Anticipated Services at Transition  none    Transportation Anticipated  family or friend will provide       Discharge Needs Assessment    Current Outpatient/Agency/Support Group  skilled nursing facility    Equipment Currently Used at Home  walker, standard;commode;glucometer    Concerns to be Addressed  discharge planning    Anticipated Changes Related to Illness  inability to care for self    Equipment Needed After Discharge  none    Outpatient/Agency/Support Group Needs  skilled nursing facility    Discharge Facility/Level of Care Needs  nursing facility, skilled;nursing facility, intermediate    Provided Post Acute Provider List?  Yes    Post Acute Provider List  Nursing Home;Inpatient Rehab    Provided Post Acute Provider Quality & Resource List?  Yes    Post Acute Provider Quality and Resource List  Inpatient Rehab;Nursing Home    Delivered To  Patient    Method of Delivery  In person    Patient's Choice of Community Agency(s)  [facility]    Current  Discharge Risk  dependent with mobility/activities of daily living        Discharge Plan     Row Name 08/08/21 1540       Plan    Plan  Discharge to [facility] for STR to LTC    Patient/Family in Agreement with Plan  yes    Plan Comments  I spoke with the patients Daughter/POA at bedside with her permission. The patient is intubated and sedated.  I introduced myself and explained my role as . I verified the information on the facesheet and the patients PCP as Dr. Nguyen [facility] in [place].  The patient uses [facility] in [place] and can only afford medications that are covered by her insurance.  She is able to obtain them.  The patient has an advanced directive and her daughter will provide paperwork regarding same.  The patient lives in a single story mobile home with her significant other, Truong.   There is 4 steps to enter the home.  Her daughter advised that the patient has had issues maneuvering and entering her home since her stroke in May.  She advised that is has been getting progressively worse until it worsened to the condition the patient is in currently.  She advised that her significant other has been helping her get off the couch and back and forth to the rest room etc.  She further explained that after she was released form rehab she was able to ambulate using her walker and recently she has been “couch bound”.  She also stated that the patient would like to go to [facility] in [place], [place].  She attempted to get her mother into long term care there but she needed a “tox screen” before they would admit her and requested one be done during this admission.  I advised Dr. Diaz of [facility].   A referral was made to [facility] for STR to LTC placement at discharge.  Due to the patients critical illness the patient’s daughter felt that short term rehab after discharge is the best option.  The patient’s daughter had questions about property that the patient  own and how that would work at discharge.   The patient’s daughter had no further questions or concerns at this time.  CM will continue to follow.        Continued Care and Services - Admitted Since 8/7/2021     Destination     Service Provider Request Status Selected Services Address Phone Fax Patient Preferred    [facility]  Pending - No Request Sent N/A [address] [place] [id number] [phone] --                Demographic Summary     Row Name 08/08/21 1526       General Information    Admission Type  inpatient    Arrived From  home    Referral Source  admission list    Reason for Consult  discharge planning    Preferred Language  English     Used During This Interaction  no        Functional Status    No documentation.       Psychosocial    No documentation.       Abuse/Neglect    No documentation.       Legal    No documentation.       Substance Abuse    No documentation.       Patient Forms    No documentation.           Jennifer Hurt RN

## 2021-08-08 NOTE — PROGRESS NOTES
Adult Nutrition  Assessment/PES    Patient Name:  Flores Marie  YOB: 1965  MRN: [id number]  Admit Date:  8/7/2021    Assessment Date:  8/8/2021       Recommend: 1) confirm placement of tube    2) Isosource HN 10 ml/hr increase 10 ml every 6 hrs to goal 35 ml/hr with 37 ml/hr free water cont   This will provide: 840 ml, 1008 kcal, 45 gm pro, 131 gm CHO, 67% DRI , 680 ml free water plus 888 ml free water flushes   Current Diprovan provides: 515 kcal  TF will need to be adjusted with large changes in diprovan.    Will cont to follow and monitor.     Reason for Assessment     Row Name 08/08/21 1723          Reason for Assessment    Reason For Assessment  physician consult     Diagnosis  pulmonary disease resp failure on vent, enceph, PNA, COPD hx substance use     Identified At Risk by Screening Criteria  tube feeding or parenteral nutrition         Nutrition/Diet History     Row Name 08/08/21 1723          Nutrition/Diet History    Typical Food/Fluid Intake  Pt sedated on vent DHT in place. Spoke w daughter at bedside and RN at station. She reports wt loss of close 60# over past yr. NKFA.         Anthropometrics     Row Name 08/08/21 1724          Anthropometrics    Weight  -- 143.7#        Usual Body Weight (UBW)    Weight Loss  unintentional     Weight Loss Time Frame  60# report by daughter over past yr        Body Mass Index (BMI)    BMI Assessment  BMI 18.5-24.9: normal         Labs/Tests/Procedures/Meds     Row Name 08/08/21 1724          Labs/Procedures/Meds    Lab Results Reviewed  reviewed        Diagnostic Tests/Procedures    Diagnostic Test/Procedure Reviewed  reviewed        Medications    Pertinent Medications Reviewed  reviewed     Pertinent Medications Comments  diprovan 19.5 ml/hr = 515 kcal from fat         Physical Findings     Row Name 08/08/21 1725          Physical Findings    Overall Physical Appearance  on ventilator support         Estimated/Assessed Needs     Row Name 08/08/21  1725          Estimated/Assessed Needs    Additional Documentation  Calorie Requirements (Group);Fluid Requirements (Group);Protein Requirements (Group)        Calorie Requirements    Estimated Calorie Need Method  [place]     Estimated Calorie Requirement Comment  1516 kcal ( [place] for vent)        Protein Requirements    Est Protein Requirement Amount (gms/kg)  1.0 gm protein 65-78 gm pro (1-1.2 gm/kg pro )        Fluid Requirements    Estimated Fluid Requirement Method  RDA Method 1516 ml         Nutrition Prescription Ordered     Row Name 08/08/21 1725          Nutrition Prescription PO    Current PO Diet  NPO         Evaluation of Received Nutrient/Fluid Intake     Row Name 08/08/21 1726          Fluid Intake Evaluation    IV Fluid (mL)  -- insufficient data        PO Evaluation    Number of Days PO Intake Evaluated  Insufficient Data               Problem/Interventions:  Problem 1     Row Name 08/08/21 1726          Nutrition Diagnoses Problem 1    Problem 1  Inadequate Nutrient Intake     Etiology (related to)  Medical Diagnosis;Factors Affecting Nutrition     Signs/Symptoms (evidenced by)  NPO               Intervention Goal     Row Name 08/08/21 1726          Intervention Goal    General  Nutrition support treatment     TF/PN  Inititiate TF/PN;Deliver (%) goal;Tolerate TF at goal     Deliver % of Goal  100 %     Weight  No significant weight loss         Nutrition Intervention     Row Name 08/08/21 1726          Nutrition Intervention    RD/Tech Action  Follow Tx progress         Nutrition Prescription     Row Name 08/08/21 1726          Nutrition Prescription EN    Enteral Prescription  Enteral begin/change         Education/Evaluation     Row Name 08/08/21 1727          Education    Education  Other (comment) spoke w daughter at bedside        Monitor/Evaluation    Monitor  Per protocol;I&O;Pertinent labs;TF delivery/tolerance;Weight           Electronically signed by:  Kristin aCnas RD  08/08/21  17:27 EDT

## 2021-08-08 NOTE — PLAN OF CARE
Goal Outcome Evaluation:  Plan of Care Reviewed With: patient        Progress: no change  Outcome Summary: Admitted from [facility], intubated, sedated, afebrile, vent settings adjusted after ABGs drawn this AM

## 2021-08-08 NOTE — PROGRESS NOTES
Pharmacokinetic Consult - Vancomycin Dosing     Flores Marie is a 56 y.o. female who has been consulted for Vancomycin dosing for pneumonia. Patient received Vancomycin 1750 mg IV X 1 on 8/8 at 0154.    Patient also prescribed Cefepime 2 grams IV every 8 hours. Proper dosing confirmed.    Relevant clinical data and objective history reviewed:  Lab Results   Component Value Date/Time    CREATININE 0.62 08/08/2021 04:27 AM    CREATININE 0.71 08/07/2021 08:52 PM    CREATININE 0.88 12/16/2014 09:38 PM    BUN 20 08/08/2021 04:27 AM    BUN 23 (H) 08/07/2021 08:52 PM    BUN 9 12/16/2014 09:38 PM     Estimated Creatinine Clearance: 105.5 mL/min (by C-G formula based on SCr of 0.62 mg/dL).    Lab Results   Component Value Date/Time    WBC 12.16 (H) 08/08/2021 04:27 AM    WBC 8.71 07/20/2021 05:54 AM    HGB 14.6 08/08/2021 04:27 AM    HGB 15.8 07/20/2021 05:54 AM    HCT 47.2 (H) 08/08/2021 04:27 AM    HCT 48.1 (H) 07/20/2021 05:54 AM    .1 (H) 08/08/2021 04:27 AM    MCV 94.7 07/20/2021 05:54 AM     08/08/2021 04:27 AM     07/20/2021 05:54 AM     Temp Readings from Last 3 Encounters:   08/08/21 98.7 °F (37.1 °C) (Axillary)   03/25/15 98.5 °F (36.9 °C)     Weight: 65.2 kg (143 lb 11.8 oz)    Assessment/Plan  The patient will be started on Vancomycin utilizing AUC dosing approach and system dosing software. AUC dosing regimen: Vancomycin 1 gram every 12 hours. Serum creatinine will be ordered per policy.  Plan for trough as patient approaches steady state, prior to the 4th dose.  Due to infection severity, will target a trough of 15-20 mcg/ml.  Pharmacy will continue to follow the patient’s culture results and clinical progress daily.    Thank you-    Abhinav Albright, MagdaD

## 2021-08-08 NOTE — CONSULTS
Group: [facility]         CONSULT NOTE    Patient Identification:  Flores Marie  56 y.o.  female  1965  [id number]            Requesting physician: Dr. Юлия Lyles    Reason for Consultation: Respiratory failure requiring mechanical ventilation    CC: Confusion    History of Present Illness:  56-year-old  female who presents with confusion.  All the history is obtained from her review of H&P dictated by Dr. Lyles.  Apparently her symptoms started 3 days ago and she was taken to the emergency room at [facility].  This was prompted by her boyfriend who states that the patient has been confused for 2 to 3 days, not eating or drinking and has history of drug abuse.  She was found to have altered mental status and respiratory distress by her daughter.  The patient was intubated upon arrival in this emergency room and she is now in the ICU, on mechanical ventilation.  The patient has a history of alcohol abuse, tobacco abuse and methamphetamine abuse.  Outside records reviewed, notes from pain clinic dated March 25, 2015 reviewed.      Review of Systems   Unable to perform ROS: Intubated       Past Medical History:  Past Medical History:   Diagnosis Date   • Arthritis    • Asthma    • COPD (chronic obstructive pulmonary disease) (CMS/HCC)    • Elevated cholesterol    • Hypertension    • Stroke (CMS/[facility])    Illicit drug use  Chronic pain syndrome  COPD  Smoker    Past Surgical History:  History reviewed. No pertinent surgical history.     Home Meds:  Medications Prior to Admission   Medication Sig Dispense Refill Last Dose   • atorvastatin (LIPITOR) 40 MG tablet Take 40 mg by mouth Daily.   8/6/2021 at Unknown time   • baclofen (LIORESAL) 10 MG tablet Take 10 mg by mouth 3 (Three) Times a Day.   8/6/2021 at Unknown time   • fluticasone (FLONASE) 50 MCG/ACT nasal spray 2 sprays into the nostril(s) as directed by provider Daily.   8/6/2021 at Unknown time   • lisinopril  "(PRINIVIL,ZESTRIL) 20 MG tablet Take 20 mg by mouth Daily.   8/6/2021 at Unknown time   • metoprolol tartrate (LOPRESSOR) 50 MG tablet Take 50 mg by mouth 2 (Two) Times a Day.   8/6/2021 at Unknown time   • pantoprazole (PROTONIX) 40 MG EC tablet Take 40 mg by mouth Daily.   8/6/2021 at Unknown time   • QUEtiapine (SEROquel) 100 MG tablet Take 100 mg by mouth Every Night.   8/6/2021 at Unknown time       Allergies:  Allergies   Allergen Reactions   • Contrast Dye Hives, Nausea And Vomiting and Swelling   • Gabapentin Swelling   • Naproxen Shortness Of Breath   • Nsaids Swelling   • Atorvastatin Swelling     Patient states she was given atorvastatin in the hospital and she had throat irritation and swelling  Patient states she was given atorvastatin in the hospital and she had throat irritation and swelling     • Cyclobenzaprine Unknown - Low Severity   • Iodinated Diagnostic Agents Rash       Social History:   Social History     Socioeconomic History   • Marital status:      Spouse name: Not on file   • Number of children: Not on file   • Years of education: Not on file   • Highest education level: Not on file   Tobacco Use   • Smoking status: Current Every Day Smoker     Packs/day: 0.50     Years: 10.00     Pack years: 5.00     Types: Cigarettes   • Tobacco comment: intubated/sedated   Substance and Sexual Activity   • Alcohol use: Not Currently   • Drug use: Not Currently   • Sexual activity: Not Currently       Family History:  History reviewed. No pertinent family history.    Physical Exam:  /81   Pulse 115   Temp 98.7 °F (37.1 °C) (Axillary)   Resp 18   Ht 165.1 cm (65\")   Wt 65.2 kg (143 lb 11.8 oz)   LMP  (LMP Unknown)   SpO2 95%   Breastfeeding No   BMI 23.92 kg/m²  Body mass index is 23.92 kg/m². 95% 65.2 kg (143 lb 11.8 oz)  Physical Exam  Constitutional:       Comments: Intubated, sedated   HENT:      Right Ear: External ear normal.      Left Ear: External ear normal.      Nose: " Nose normal.      Mouth/Throat:      Comments: Oral exam shows endotracheal tube in good position  Eyes:      Conjunctiva/sclera: Conjunctivae normal.   Neck:      Thyroid: No thyromegaly.      Vascular: No JVD.      Trachea: No tracheal deviation.   Cardiovascular:      Rate and Rhythm: Normal rate and regular rhythm.      Heart sounds: Normal heart sounds. No murmur heard.     Pulmonary:      Effort: Pulmonary effort is normal.      Breath sounds: Normal breath sounds.      Comments: Nonlabored breathing, no wheezing or crackles  Abdominal:      Palpations: Abdomen is soft.      Tenderness: There is no abdominal tenderness. There is no rebound.      Comments: Cannot palpate liver or spleen enlargement   Musculoskeletal:         General: No deformity.      Cervical back: Neck supple. No rigidity.   Skin:     General: Skin is warm.      Findings: No rash.      Comments: No palpable nodules   Neurological:      Comments: Sedated, does not open eyes, grimaces to painful sternal rub, not following commands.  Intubated   Psychiatric:      Comments: Patient is intubated         LABS:  External COVID19   Date Value Ref Range Status   08/04/2020 Not Detected Not Detected Final     Comment:     Caution should be exercised when interpreting a result of 'Not Detected'. A result of 'Not Detected' does not rule out COVID-19 and cannot be used as sole basis for treatment or patient management decisions. If COVID-19 is still suspected following a 'Not Detected' result, re-testing should be considered.       Lab Results   Component Value Date    CALCIUM 8.6 08/08/2021    PHOS 3.5 08/08/2021     Results from last 7 days   Lab Units 08/08/21  0427 08/07/21 2053 08/07/21 2052 08/07/21 2052   MAGNESIUM mg/dL 2.0  --   --  1.9   SODIUM mmol/L 135*  --   --  136   POTASSIUM mmol/L 5.1  --   --  4.3   CHLORIDE mmol/L 98  --   --  100   CO2 mmol/L 33.0*  --   --  31.8*   BUN mg/dL 20  --   --  23*   CREATININE mg/dL 0.62  --   --  0.71    GLUCOSE mg/dL 99  --    < > 100*   CALCIUM mg/dL 8.6  --   --  7.7*   WBC 10*3/mm3 12.16* 11.81*  --   --    HEMOGLOBIN g/dL 14.6 13.7  --   --    PLATELETS 10*3/mm3 222 195  --   --     < > = values in this interval not displayed.     Lab Results   Component Value Date    TROPONINI <0.010 07/20/2021                 Results from last 7 days   Lab Units 08/08/21  0545 08/07/21  2110   PH, ARTERIAL pH units 7.287* 7.366   PCO2, ARTERIAL mm Hg 74.4* 59.2*   PO2 ART mm Hg 71.9* 78.4*   O2 SATURATION ART % 93.4*  --    MODALITY  Ventilator Ventilator                 Lab Results   Component Value Date    TSH 0.278 08/07/2021     Estimated Creatinine Clearance: 105.5 mL/min (by C-G formula based on SCr of 0.62 mg/dL).         Imaging: I personally visualized the images of chest x-ray showing endotracheal tube close to the waldo.  There is a right lower lung field infiltrate..      Assessment:  Acute encephalopathy  Acute respiratory failure  Pneumonia  History of illicit drug abuse  Leukocytosis  COPD  Smoker        Recommendations:  Patient is critically ill.  We will continue to adjust mechanical ventilation.  We will adjust sedation and analgesia to see if we can awaken the patient tomorrow and check her neurologic status.  If her neurologic status is intact and appropriate for weaning trial we will proceed with such.  We will turn off propofol at 7 AM and use IV fentanyl push as needed for agitation.  Continue cefepime and vancomycin for now.  Chest x-ray suggests right lower lung field infiltrate.  Send sputum for culture.  Swab for COVID-19 PCR testing.    Total critical care time 34 minutes excluding any separately billable procedure time    Patient was placed in face mask upon entering room and kept mask on throughout our encounter. I wore full protective equipment throughout this patient encounter including a face mask, gown and gloves. Hand hygiene was performed before donning protective equipment and after  removal when leaving the room.    Guillermo Kim MD  8/8/2021  13:31 EDT      Much of this encounter note is an electronic transcription/translation of spoken language to printed text using Dragon Software.

## 2021-08-09 NOTE — CASE MANAGEMENT/SOCIAL WORK
Continued Stay Note  CHRISTOPHER Gardner     Patient Name: Flores Marie  MRN: [id number]  Today's Date: 8/9/2021    Admit Date: 8/7/2021    Discharge Plan     Row Name 08/09/21 1247       Plan    Plan  [facility]    Plan Comments  Patient currently remains on the vent. Family anticipates patient to go to [facility] at discharge. CM will continue to follow patient's progress for discharge plans.        Discharge Codes    No documentation.             Nina Avitia RN

## 2021-08-09 NOTE — CASE MANAGEMENT/SOCIAL WORK
Continued Stay Note  CHRISTOPHER Gardner     Patient Name: Flores Marie  MRN: [id number]  Today's Date: 8/9/2021    Admit Date: 8/7/2021    Discharge Plan     Row Name 08/09/21 1302       Plan    Plan  [facility]    Plan Comments  Spoke with Kala at [facility] to see if they had received the referral made over the weekend and she states that she did not and asked if I could fax the information to her. CM faxed clinicals to [facility] @ [phone]. CM awaits return call for determination.    Row Name 08/09/21 1247       Plan    Plan  [facility]    Plan Comments  Patient currently remains on the vent. Family anticipates patient to go to [facility] at discharge. CM will continue to follow patient's progress for discharge plans.        Discharge Codes    No documentation.             Nina Avitia RN

## 2021-08-09 NOTE — PROGRESS NOTES
[facility] INPATIENT PROGRESS NOTE         [facility]    2021      PATIENT IDENTIFICATION:  Name: Flores Marie ADMIT: 2021   : 1965  PCP: Sayda Burnett MD    MRN: [id number] LOS: 2 days   AGE/SEX: 56 y.o. female  ROOM: ICU6/1                     LOS 2    Reason for visit: Confusion, encephalopathy       SUBJECTIVE:    Remains intubated and sedated at this time.   Review of Systems   Unable to perform ROS: Intubated         Objective   OBJECTIVE:    Vital Sign Min/Max for last 24 hours  Temp  Min: 97.4 °F (36.3 °C)  Max: 98.8 °F (37.1 °C)   BP  Min: 92/66  Max: 133/87   Pulse  Min: 109  Max: 124   Resp  Min: 16  Max: 22   SpO2  Min: 90 %  Max: 96 %   No data recorded   Weight  Min: 64.9 kg (143 lb)  Max: 64.9 kg (143 lb)       FiO2 (%):  [33 %-50 %] 49 %  S RR:  [16-22] 22  PEEP/CPAP (cm H2O):  [5 cm H20] 5 cm H20  [place] SUP:  [0 cm H20] 0 cm H20  MAP (cm H2O):  [8.9-11] 11           FiO2 (%): 49 %     Body mass index is 23.8 kg/m².    Intake/Output Summary (Last 24 hours) at 2021 1315  Last data filed at 2021 0500  Gross per 24 hour   Intake 1071 ml   Output 925 ml   Net 146 ml         Exam:  GEN:  Intubated and sedated, No distress, appears stated age  EYES:   PERRL, anicteric sclerae  ENT:    External ears/nose normal, OP clear, ETT in place  NECK:  No adenopathy, midline trachea  LUNGS: Mechanical sounds, but clear  CV:  Normal S1S2, without murmur  ABD:  Nontender, nodistended, no hepatosplenomegaly, +BS  EXT:  No edema.  No cyanosis or clubbing.  No mottling and normal cap refill.        Scheduled meds:  cefepime, 2 g, Intravenous, Q8H  chlorhexidine, 15 mL, Mouth/Throat, Q12H  enoxaparin, 40 mg, Subcutaneous, Nightly  metroNIDAZOLE, 500 mg, Intravenous, Q8H  pantoprazole, 40 mg, Intravenous, Q24H  sodium chloride, 10 mL, Intravenous, Q12H  vancomycin, 20 mg/kg, Intravenous, Once  vancomycin, 1,000 mg, Intravenous, Q12H      IV meds:                       Pharmacy to dose vancomycin,   propofol, 5-50 mcg/kg/min (Order-Specific), Last Rate: 35 mcg/kg/min (08/09/21 1154)      Data Review:  Results from last 7 days   Lab Units 08/09/21 0419 08/08/21 0427 08/08/21 0427 08/07/21 2052 08/07/21 2052   SODIUM mmol/L 138  --  135*  --  136   POTASSIUM mmol/L 4.7  --  5.1  --  4.3   CHLORIDE mmol/L 99  --  98  --  100   CO2 mmol/L 34.8*  --  33.0*  --  31.8*   BUN mg/dL 13  --  20  --  23*   CREATININE mg/dL 0.52*  --  0.62  --  0.71   GLUCOSE mg/dL 150*   < > 99   < > 100*   CALCIUM mg/dL 8.4*  --  8.6  --  7.7*    < > = values in this interval not displayed.         Estimated Creatinine Clearance: 125.2 mL/min (A) (by C-G formula based on SCr of 0.52 mg/dL (L)).  Results from last 7 days   Lab Units 08/09/21 0419 08/08/21 0427 08/07/21 2053   WBC 10*3/mm3 13.40* 12.16* 11.81*   HEMOGLOBIN g/dL 13.0 14.6 13.7   PLATELETS 10*3/mm3 203 222 195         Results from last 7 days   Lab Units 08/09/21 0419   ALT (SGPT) U/L 13   AST (SGOT) U/L 20     Results from last 7 days   Lab Units 08/09/21  0800 08/08/21  0545 08/07/21  2110   PH, ARTERIAL pH units 7.243* 7.287* 7.366   PO2 ART mm Hg 77.2* 71.9* 78.4*   PCO2, ARTERIAL mm Hg 94.0* 74.4* 59.2*   HCO3 ART mmol/L 40.5* 35.5* 33.9*     Results from last 7 days   Lab Units 08/09/21 0419   PROCALCITONIN ng/mL 0.03         Hemoglobin A1C   Date/Time Value Ref Range Status   08/07/2021 2052 5.70 (H) 4.80 - 5.60 % Final               Active Hospital Problems    Diagnosis  POA   • Respiratory failure with hypoxia (CMS/[facility]) [J96.91]  Yes      Resolved Hospital Problems   No resolved problems to display.         ASSESSMENT:  Acute encephalopathy  Acute respiratory failure  Pneumonia, RVP neg, MRSA screen neg  History of illicit drug abuse  Leukocytosis  COPD  Smoker     Recommendations: Patient is critically ill.  - We will continue to adjust mechanical ventilation, increased RR this morning with significant hypercapnea, ETCO2  seemed to improve significantly afterwards, will need to monitor   - would recheck VBG this afternoon, some permissive hypercapnea is ok but not to this level, may need to increase rate further    - she does seem to have chronic hypercapnea with bicarb at 34      - continue tube feeds     - We will adjust sedation and analgesia to see if we can awaken the patient tomorrow and check her neurologic status.  If her neurologic status is intact and appropriate for weaning trial we will proceed with such.    - We will turn off sedation tomorrow morning at 6 AM and use IV fentanyl push as needed for agitation.    - Continue cefepime/metronidazole for LLL infitrate, ok to stop vanc at this time with neg MRSA screen, sputum pending     Total critical care time 36 minutes excluding any separately billable procedure time     Patient was placed in face mask upon entering room and kept mask on throughout our encounter. I wore full protective equipment throughout this patient encounter including a face mask, gown and gloves. Hand hygiene was performed before donning protective equipment and after removal when leaving the room.          Efe Rubi MD  Pulmonary and Critical Care Medicine  [facility]  8/9/2021  13:15 EDT

## 2021-08-09 NOTE — PAYOR COMM NOTE
"Flores Marie (56 y.o. Female)     ATTN: NURSE REVIEWER   RE:  INPATIENT AUTH REQUEST  REF#[id number]  PLS REPLY TO KARINA GARCIA [phone] FAX# [phone]      Date of Birth Social Security Number Address Home Phone MRN    1965  [address] [place] [address] [phone] [id number]    Sabianism Marital Status          None        Admission Date Admission Type Admitting Provider Attending Provider Department, Room/Bed    21 Urgent Jhon Mcgill, Jhon Mcghee,  [facility] ICU, ICU    Discharge Date Discharge Disposition Discharge Destination                       Attending Provider: Jhon Mcgill DO    Allergies: Contrast Dye, Gabapentin, Naproxen, Nsaids, Atorvastatin, Cyclobenzaprine, Iodinated Diagnostic Agents    Isolation: None   Infection: None   Code Status: CPR    Ht: 165.1 cm (65\")   Wt: 64.9 kg (143 lb)    Admission Cmt: None   Principal Problem: None                Active Insurance as of 2021     Primary Coverage     Payor Plan Insurance Group Employer/Plan Group    [place] [id number]     Payor Plan Address Payor Plan Phone Number Payor Plan Fax Number Effective Dates    [employer] [address] [address] [phone]  2021 - None Entered    [facility] [id number]       Subscriber Name Subscriber Birth Date Member ID       FLORES MARIE 1965 [id number]                 Emergency Contacts      (Rel.) Home Phone Work Phone Mobile Phone    MORRISONJAMAAL BAEZ (Daughter) [phone] -- --    Fadia Brito (Daughter) [phone] -- --    KelleeKurtis (Son) [phone] -- --               History & Physical      Tiffanie Gomez MD at 21          [facility]   HISTORY AND PHYSICAL    Patient Name: Flores Marie  : 1965  MRN: [id number]  Primary Care Physician:  Sayda Burnett MD  Date of admission: 2021    Subjective   Subjective     Chief " Complaint: confusion and weakness    * history obtained by daughter Corrine at bedside.    55 y/o F with hx of CVA in April-march 2021, prior hx of substance abuse, chronic cervicalgia, prior EtOH abuse, was taken to [facility] ED earlier today by daughter after she received a phone call from patient's boyfriend stating that she was not in her usual state of health and had poor oral intake, he claimed last time patient had eaten was 2-3 days ago. When patient's daughter arrived to patient, (last seen Monday 8/2), patient was found to be altered, only oriented to self, musky gray skin color and she was breathless. Upon ER arrival, patient;s daughter and NOK was informed about her rapid decline in respiratory distress and blood pressure and proceeded to be endotracheally intubated.   Per patient's daughter patient has cut down in tobacco use due to worsening respiratory distress and also stopped drinking ETOH and prior use of methamphetamines.       Review of Systems   Unable to perform ROS: Acuity of condition        Personal History     No past medical history on file.    No past surgical history on file.    Family History: family history is not on file. Otherwise pertinent FHx was reviewed and not pertinent to current issue.    Social History:      Home Medications:  QUEtiapine, atorvastatin, baclofen, fluticasone, lisinopril, metoprolol tartrate, and pantoprazole    Allergies:  Allergies   Allergen Reactions   • Contrast Dye Hives, Nausea And Vomiting and Swelling   • Gabapentin Swelling   • Naproxen Shortness Of Breath   • Nsaids Swelling   • Atorvastatin Swelling     Patient states she was given atorvastatin in the hospital and she had throat irritation and swelling  Patient states she was given atorvastatin in the hospital and she had throat irritation and swelling     • Cyclobenzaprine Unknown - Low Severity   • Iodinated Diagnostic Agents Rash       Objective    Objective     Vitals:   Temp:  [96.5 °F  (35.8 °C)] 96.5 °F (35.8 °C)  Heart Rate:  [112] 112  Resp:  [12] 12  BP: (143)/(103) 143/103  FiO2 (%):  [50 %-75 %] 50 %    Physical Exam  Vitals and nursing note reviewed.   Constitutional:       General: She is not in acute distress.     Appearance: She is not ill-appearing, toxic-appearing or diaphoretic.   HENT:      Head: Normocephalic and atraumatic.      Right Ear: External ear normal.      Left Ear: External ear normal.      Nose: Nose normal.      Mouth/Throat:      Mouth: Mucous membranes are moist.   Eyes:      Conjunctiva/sclera: Conjunctivae normal.      Pupils: Pupils are equal, round, and reactive to light.   Cardiovascular:      Rate and Rhythm: Normal rate and regular rhythm.      Pulses: Normal pulses.      Heart sounds: Normal heart sounds. No murmur heard.   No friction rub. No gallop.    Pulmonary:      Effort: Pulmonary effort is normal. No respiratory distress.      Breath sounds: No stridor. Rhonchi present. No wheezing or rales.   Chest:      Chest wall: No tenderness.   Abdominal:      General: Abdomen is flat. There is no distension.      Tenderness: There is no abdominal tenderness.   Musculoskeletal:         General: No swelling or tenderness.      Cervical back: Neck supple. No rigidity or tenderness.      Right lower leg: No edema.      Left lower leg: No edema.   Skin:     General: Skin is warm.   Neurological:      General: No focal deficit present.         Result Review    Result Review:  I have personally reviewed the results from the time of this admission to 8/7/2021 23:31 EDT and agree with these findings:  []  Laboratory  []  Microbiology  []  Radiology  []  EKG/Telemetry   []  Cardiology/Vascular   []  Pathology  []  Old records  []  Other:  Most notable findings include:   Outside hospital records- Utox negative, Na 133, K 5.0 CO2 37, SrCr 1.03, BUN 34, AST 26, ALT 22  WBC 14.87, Hb 17.81, plt 299  EKG sinus tachycardia   COVID-19 negative      Assessment/Plan   Assessment  / Plan     Brief Patient Summary:  Flores Marie is a 56 y.o. female who is transferred from outside facility for Acute hypoxic hypercapnic respiratory failure.     Active Hospital Problems:  Active Hospital Problems    Diagnosis    • Respiratory failure with hypoxia (CMS/HCC)      Plan:   Acute hypoxic hypercapnic respiratory failure  CAP vs aspiration pneumonia  Acute encephalopathy with prior neuro history that is undiagnosed  Hx of CVA?, remote  Tobacco abuse disorder  Hx of substance abuse    -continue vent management, currently on ACVC 400/20/50/5, sats 95%, ABG acceptable  -wean FiO2 as tolerated to maintain sats >90%  -duonebs PRN q6h   -will switch to vancomycin + zosyn given high risk factors due to multiple admissions and visits to ED  -On sedation with propofol, daily awakening trials when indicated followed by SBT  -f/u routine labs  -obtain outside records for brain imaging      DVT prophylaxis:  Medical DVT prophylaxis orders are present.    CODE STATUS:    Level Of Support Discussed With: Next of Kin (If No Surrogate)  Code Status: CPR  Medical Interventions (Level of Support Prior to Arrest): Full    Admission Status:  I believe this patient meets ICU status.    Electronically signed by Tiffanie Trean MD, 08/07/21, 8:54 PM EDT.    Electronically signed by Tiffanie Gomez MD at 08/07/21 2331         Facility-Administered Medications as of 8/9/2021   Medication Dose Route Frequency Provider Last Rate Last Admin   • [COMPLETED] azithromycin (ZITHROMAX) 500 MG injection  - ADS Override Pull        500 mg at 08/07/21 2356   • [COMPLETED] cefepime (MAXIPIME) IVPB 2 g/50ml D5W (premix)  2 g Intravenous Once Tra Diaz MD   2 g at 08/08/21 0906   • cefepime (MAXIPIME) IVPB 2 g/50ml D5W (premix)  2 g Intravenous Q8H Tra Diaz MD   2 g at 08/09/21 1650   • chlorhexidine (PERIDEX) 0.12 % solution 15 mL  15 mL Mouth/Throat Q12H Jhon Mcgill DO   15 mL at 08/09/21  0831   • enoxaparin (LOVENOX) syringe 40 mg  40 mg Subcutaneous Nightly Jhon Mcgill DO   40 mg at 08/08/21 2034   • fentaNYL citrate (PF) (SUBLIMAZE) injection 100 mcg  100 mcg Intravenous Q2H PRN Guillermo Kim MD   100 mcg at 08/08/21 1925   • metroNIDAZOLE (FLAGYL) 500 mg/100mL IVPB  500 mg Intravenous Q8H Tra Diaz MD   500 mg at 08/09/21 1650   • nitroglycerin (NITROSTAT) SL tablet 0.4 mg  0.4 mg Sublingual Q5 Min PRN Jhon Mcgill DO       • pantoprazole (PROTONIX) injection 40 mg  40 mg Intravenous Q24H Jhon Mcgill DO   40 mg at 08/09/21 0830   • [COMPLETED] piperacillin-tazobactam (ZOSYN) 4.5 g/100 mL 0.9% NS IVPB (mbp)  4.5 g Intravenous Once [place] Tiffanie Teran MD   4.5 g at 08/08/21 0622   • propofol (DIPRIVAN) infusion 10 mg/mL 100 mL  5-50 mcg/kg/min (Order-Specific) Intravenous Titrated Jhon Mcgill DO 19.2 mL/hr at 08/09/21 1701 40 mcg/kg/min at 08/09/21 1701   • sodium chloride 0.9 % flush 10 mL  10 mL Intravenous PRN Jhon Mcgill DO       • sodium chloride 0.9 % flush 10 mL  10 mL Intravenous Q12H Jhon Mcgill DO   10 mL at 08/09/21 1154   • sodium chloride 0.9 % infusion 40 mL  40 mL Intravenous PRN Jhon Mcgill DO       • [COMPLETED] vancomycin (VANCOCIN) 500 MG injection  - ADS Override Pull        750 mg at 08/08/21 0153   • [COMPLETED] vancomycin 750 MG injection  - ADS Override Pull        750 mg at 08/08/21 0151     Lab Results (last 48 hours)     Procedure Component Value Units Date/Time    Vancomycin, Random [[id number]]  (Normal) Collected: 08/09/21 1330    Specimen: Blood Updated: 08/09/21 1402     Vancomycin Random 9.90 mcg/mL     Narrative:      Therapeutic Ranges for Vancomycin    Vancomycin Random   5.0-40.0 mcg/mL  Vancomycin Trough   5.0-20.0 mcg/mL  Vancomycin Peak     20.0-40.0 mcg/mL    MRSA Screen Culture (Outpatient) - Swab, Nares [[id number]]  (Normal) Collected: 08/08/21 0150    Specimen: Swab from Nares  Updated: 08/09/21 1249     MRSA Screen Cx No Methicillin Resistant Staphylococcus aureus isolated    Respiratory Panel, PCR (WITHOUT COVID) - Swab, Nasopharynx [[id number]]  (Normal) Collected: 08/08/21 1952    Specimen: Swab from Nasopharynx Updated: 08/09/21 1128     ADENOVIRUS, PCR Not Detected     Coronavirus 229E Not Detected     Coronavirus HKU1 Not Detected     Coronavirus NL63 Not Detected     Coronavirus OC43 Not Detected     Human Metapneumovirus Not Detected     Human Rhinovirus/Enterovirus Not Detected     Influenza B PCR Not Detected     Parainfluenza Virus 1 Not Detected     Parainfluenza Virus 2 Not Detected     Parainfluenza Virus 3 Not Detected     Parainfluenza Virus 4 Not Detected     Bordetella pertussis pcr Not Detected     Chlamydophila pneumoniae PCR Not Detected     Mycoplasma pneumo by PCR Not Detected     Influenza A PCR Not Detected     RSV, PCR Not Detected     Bordetella parapertussis PCR Not Detected    Narrative:      The coronavirus on the RVP is NOT COVID-19 and is NOT indicative of infection with COVID-19.    In the setting of a positive respiratory panel with a viral infection PLUS a negative procalcitonin without other underlying concern for bacterial infection, consider observing off antibiotics or discontinuation of antibiotics and continue supportive care. If the respiratory panel is positive for atypical bacterial infection (Bordetella pertussis, Chlamydophila pneumoniae, or Mycoplasma pneumoniae), consider antibiotic de-escalation to target atypical bacterial infection.    Respiratory Culture - Aspirate, ET Suction [[id number]] Collected: 08/08/21 0438    Specimen: Aspirate from ET Suction Updated: 08/09/21 0917     Respiratory Culture Scant growth (1+) Normal Respiratory Ena: NO S.aureus/MRSA or Pseudomonas aeruginosa     Gram Stain Many (4+) WBCs per low power field      Rare (1+) Gram positive cocci in pairs      Rare (1+) Normal respiratory ena    Blood Gas, Arterial -  [[id number]]  (Abnormal) Collected: 08/09/21 0800    Specimen: Arterial Blood Updated: 08/09/21 0816     Site Right Radial     Kris's Test Positive     pH, Arterial 7.243 pH units      Comment: 84 Value below reference range        pCO2, Arterial 94.0 mm Hg      Comment: 86 Value above critical limit        pO2, Arterial 77.2 mm Hg      Comment: 84 Value below reference range        HCO3, Arterial 40.5 mmol/L      Comment: 83 Value above reference range        Base Excess, Arterial 9.2 mmol/L      Comment: 83 Value above reference range        O2 Saturation, Arterial 95.1 %      Hemoglobin, Blood Gas 13.9 g/dL      Temperature 37.0 C      Barometric Pressure for Blood Gas 740 mmHg      Modality Ventilator     FIO2 50 %      Ventilator Mode AC     Set Tidal Volume 400     Set Mech Resp Rate 16.0     PEEP 5.0     Notified Who RB AND VERIFIED     Notified By [id number]     Notified Time 08/09/2021 08:17     Collected by [id number]     Comment: Meter: [id number]     :  [id number]        pCO2, Temperature Corrected 94.0 mm Hg      pH, Temp Corrected 7.243 pH Units      pO2, Temperature Corrected 77.2 mm Hg     BNP [[id number]]  (Normal) Collected: 08/09/21 0419    Specimen: Blood Updated: 08/09/21 0554     proBNP 228.2 pg/mL     Narrative:      Among patients with dyspnea, NT-proBNP is highly sensitive for the detection of acute congestive heart failure. In addition NT-proBNP of <300 pg/ml effectively rules out acute congestive heart failure with 99% negative predictive value.    Results may be falsely decreased if patient taking Biotin.      Procalcitonin [[id number]]  (Normal) Collected: 08/09/21 0419    Specimen: Blood Updated: 08/09/21 0551     Procalcitonin 0.03 ng/mL     Narrative:      Results may be falsely decreased if patient taking Biotin.     Magnesium [[id number]]  (Normal) Collected: 08/09/21 0419    Specimen: Blood Updated: 08/09/21 0516     Magnesium 2.0 mg/dL     Comprehensive Metabolic Panel  [[id number]]  (Abnormal) Collected: 08/09/21 0419    Specimen: Blood Updated: 08/09/21 0516     Glucose 150 mg/dL      BUN 13 mg/dL      Creatinine 0.52 mg/dL      Sodium 138 mmol/L      Potassium 4.7 mmol/L      Chloride 99 mmol/L      CO2 34.8 mmol/L      Calcium 8.4 mg/dL      Total Protein 5.5 g/dL      Albumin 2.60 g/dL      ALT (SGPT) 13 U/L      AST (SGOT) 20 U/L      Alkaline Phosphatase 68 U/L      Total Bilirubin 0.2 mg/dL      eGFR Non African Amer 122 mL/min/1.73      Globulin 2.9 gm/dL      A/G Ratio 0.9 g/dL      BUN/Creatinine Ratio 25.0     Anion Gap 4.2 mmol/L     Narrative:      GFR Normal >60  Chronic Kidney Disease <60  Kidney Failure <15      CBC & Differential [[id number]]  (Abnormal) Collected: 08/09/21 0419    Specimen: Blood Updated: 08/09/21 0513    Narrative:      The following orders were created for panel order CBC & Differential.  Procedure                               Abnormality         Status                     ---------                               -----------         ------                     CBC Auto Differential[[id number]]        Abnormal            Final result               Scan Slide[[id number]]                                       Final result                 Please view results for these tests on the individual orders.    Scan Slide [[id number]] Collected: 08/09/21 0419    Specimen: Blood Updated: 08/09/21 0513     Macrocytes Slight/1+     WBC Morphology Normal     Platelet Morphology Normal    CBC Auto Differential [[id number]]  (Abnormal) Collected: 08/09/21 0419    Specimen: Blood Updated: 08/09/21 0500     WBC 13.40 10*3/mm3      RBC 4.08 10*6/mm3      Hemoglobin 13.0 g/dL      Hematocrit 42.9 %      .1 fL      MCH 31.9 pg      MCHC 30.3 g/dL      RDW 12.9 %      RDW-SD 50.9 fl      MPV 9.8 fL      Platelets 203 10*3/mm3      Neutrophil % 86.3 %      Lymphocyte % 7.3 %      Monocyte % 5.7 %      Eosinophil % 0.0 %      Basophil % 0.1 %      Immature Grans % 0.6 %       Neutrophils, Absolute 11.57 10*3/mm3      Lymphocytes, Absolute 0.98 10*3/mm3      Monocytes, Absolute 0.76 10*3/mm3      Eosinophils, Absolute 0.00 10*3/mm3      Basophils, Absolute 0.01 10*3/mm3      Immature Grans, Absolute 0.08 10*3/mm3     Blood Culture - Blood, Arm, Left [[id number]] Collected: 08/07/21 2243    Specimen: Blood from Arm, Left Updated: 08/08/21 2315     Blood Culture No growth at 24 hours    Blood Culture - Blood, Arm, Left [[id number]] Collected: 08/07/21 2054    Specimen: Blood from Arm, Left Updated: 08/08/21 2115     Blood Culture No growth at 24 hours    Respiratory Culture - Sputum, ET Suction [[id number]] Collected: 08/08/21 1337    Specimen: Sputum from ET Suction Updated: 08/08/21 1827     Gram Stain Moderate (3+) WBCs seen      No epithelial cells seen      Rare (1+) Normal respiratory ena    Legionella Antigen, Urine - Urine, Urine, Clean Catch [[id number]]  (Normal) Collected: 08/08/21 1316    Specimen: Urine, Clean Catch Updated: 08/08/21 1346     LEGIONELLA ANTIGEN, URINE Negative    S. Pneumo Ag Urine or CSF - Urine, Urine, Clean Catch [[id number]]  (Normal) Collected: 08/08/21 1316    Specimen: Urine, Clean Catch Updated: 08/08/21 1346     Strep Pneumo Ag Negative    Urine Drug Screen - Urine, Catheter [[id number]]  (Normal) Collected: 08/08/21 1315    Specimen: Urine, Catheter Updated: 08/08/21 1335     THC, Screen, Urine Negative     Phencyclidine (PCP), Urine Negative     Cocaine Screen, Urine Negative     Methamphetamine, Ur Negative     Opiate Screen Negative     Amphetamine Screen, Urine Negative     Benzodiazepine Screen, Urine Negative     Tricyclic Antidepressants Screen Negative     Methadone Screen, Urine Negative     Barbiturates Screen, Urine Negative     Oxycodone Screen, Urine Negative     Propoxyphene Screen Negative     Buprenorphine, Screen, Urine Negative    Narrative:      Urine drug screen results are to be used for medical purposes only.  They are not to be  used for legal purposes such as employment testing.  Negative results do not necessarily mean the complete absence of a subtance, but rather that the result is less than the cutoff for that substance.  Positive results are unconfirmed and considered Preliminary Positive.  [facility] does not automatically confirm Postitive Unconfirmed results.  The physician may request (order) an Unconfirmed Positive result to be sent out for confirmation.      Negative Thresholds for Drugs Screened:    THC screen, urine                          50 ng/ml  Phenycyclidine (PCP), urine                25 ng/ml  Cocaine screen, urine                     150 ng/ml  Methamphetamine, urine                    500 ng/ml  Opiate screen, urine                      100 ng/ml  Amphetamine screen, urine                 500 ng/ml  Benzodiazepine screen, urine              150 ng/ml  Tricyclic Antidepressants screen, urine   300 ng/ml  Methadone screen, urine                   200 ng/ml  Barbiturates screen, urine                200 ng/ml  Oxycodone screen, urine                   100 ng/ml  Propoxyphene screen, urine                300 ng/ml  Buprenorphine screen, urine                10 ng/ml    Blood Gas, Arterial - [[id number]]  (Abnormal) Collected: 08/07/21 2110    Specimen: Arterial Blood Updated: 08/08/21 1024     Site Right Brachial     Kris's Test N/A     pH, Arterial 7.366 pH units      pCO2, Arterial 59.2 mm Hg      Comment: 83 Value above reference range        pO2, Arterial 78.4 mm Hg      Comment: 84 Value below reference range        HCO3, Arterial 33.9 mmol/L      Comment: 83 Value above reference range        Base Excess, Arterial --     Comment: *not reportable        O2 Saturation, Arterial --     Comment:  The parameter value has a question [id number] Inhomogeneous sample        Hemoglobin, Blood Gas --     Comment:  The parameter value has a question [id number] Inhomogeneous sample        Temperature 37.0 C       Barometric Pressure for Blood Gas 739 mmHg      Modality Ventilator     FIO2 50 %      Ventilator Mode Volume Support     Set Tidal Volume 400     Set Mech Resp Rate 16.0     PEEP 5.0     Collected by [id number]     Comment: Meter: [id number]     :  [id number]        pCO2, Temperature Corrected 59.2 mm Hg      pH, Temp Corrected 7.366 pH Units      pO2, Temperature Corrected 78.4 mm Hg     Blood Gas, Arterial - [[id number]]  (Abnormal) Collected: 08/08/21 0545    Specimen: Arterial Blood Updated: 08/08/21 0553     Site Right Brachial     Kris's Test N/A     pH, Arterial 7.287 pH units      Comment: 84 Value below reference range        pCO2, Arterial 74.4 mm Hg      Comment: 86 Value above critical limit        pO2, Arterial 71.9 mm Hg      Comment: 84 Value below reference range        HCO3, Arterial 35.5 mmol/L      Comment: 83 Value above reference range        Base Excess, Arterial 5.9 mmol/L      Comment: 83 Value above reference range        O2 Saturation, Arterial 93.4 %      Comment: 84 Value below reference range        Hemoglobin, Blood Gas 14.6 g/dL      Temperature 37.0 C      Barometric Pressure for Blood Gas 740 mmHg      Modality Ventilator     FIO2 50 %      Ventilator Mode Volume Support     Set Tidal Volume 400     Set Mech Resp Rate 16.0     PEEP 5.0     Notified Who RB AND V DR ENNIS     Notified By [id number]     Notified Time 08/08/2021 05:54     Collected by [id number]     Comment: Meter: [id number]     :  [id number]        pCO2, Temperature Corrected 74.4 mm Hg      pH, Temp Corrected 7.287 pH Units      pO2, Temperature Corrected 71.9 mm Hg     Phosphorus [[id number]]  (Normal) Collected: 08/08/21 0427    Specimen: Blood Updated: 08/08/21 0549     Phosphorus 3.5 mg/dL     Basic Metabolic Panel [[id number]]  (Abnormal) Collected: 08/08/21 0427    Specimen: Blood Updated: 08/08/21 0500     Glucose 99 mg/dL      BUN 20 mg/dL      Creatinine 0.62 mg/dL      Sodium 135 mmol/L       Potassium 5.1 mmol/L      Comment: Slight hemolysis detected by analyzer. Results may be affected.        Chloride 98 mmol/L      CO2 33.0 mmol/L      Calcium 8.6 mg/dL      eGFR Non African Amer 100 mL/min/1.73      BUN/Creatinine Ratio 32.3     Anion Gap 4.0 mmol/L     Narrative:      GFR Normal >60  Chronic Kidney Disease <60  Kidney Failure <15      Magnesium [[id number]]  (Normal) Collected: 08/08/21 0427    Specimen: Blood Updated: 08/08/21 0454     Magnesium 2.0 mg/dL     CBC & Differential [[id number]]  (Abnormal) Collected: 08/08/21 0427    Specimen: Blood Updated: 08/08/21 0437    Narrative:      The following orders were created for panel order CBC & Differential.  Procedure                               Abnormality         Status                     ---------                               -----------         ------                     CBC Auto Differential[[id number]]        Abnormal            Final result                 Please view results for these tests on the individual orders.    CBC Auto Differential [[id number]]  (Abnormal) Collected: 08/08/21 0427    Specimen: Blood Updated: 08/08/21 0437     WBC 12.16 10*3/mm3      RBC 4.67 10*6/mm3      Hemoglobin 14.6 g/dL      Hematocrit 47.2 %      .1 fL      MCH 31.3 pg      MCHC 30.9 g/dL      RDW 13.2 %      RDW-SD 49.1 fl      MPV 10.0 fL      Platelets 222 10*3/mm3      Neutrophil % 83.8 %      Lymphocyte % 9.5 %      Monocyte % 6.0 %      Eosinophil % 0.0 %      Basophil % 0.1 %      Immature Grans % 0.6 %      Neutrophils, Absolute 10.19 10*3/mm3      Lymphocytes, Absolute 1.16 10*3/mm3      Monocytes, Absolute 0.73 10*3/mm3      Eosinophils, Absolute 0.00 10*3/mm3      Basophils, Absolute 0.01 10*3/mm3      Immature Grans, Absolute 0.07 10*3/mm3      nRBC 0.0 /100 WBC     TSH [[id number]]  (Normal) Collected: 08/07/21 2052    Specimen: Blood Updated: 08/07/21 2138     TSH 0.278 uIU/mL     Hemoglobin A1c [[id number]]  (Abnormal) Collected: 08/07/21  2052    Specimen: Blood Updated: 08/07/21 2132     Hemoglobin A1C 5.70 %     Narrative:      Hemoglobin A1C Ranges:    Increased Risk for Diabetes  5.7% to 6.4%  Diabetes                     >= 6.5%  Diabetic Goal                < 7.0%    Basic Metabolic Panel [[id number]]  (Abnormal) Collected: 08/07/21 2052    Specimen: Blood Updated: 08/07/21 2126     Glucose 100 mg/dL      BUN 23 mg/dL      Creatinine 0.71 mg/dL      Sodium 136 mmol/L      Potassium 4.3 mmol/L      Chloride 100 mmol/L      CO2 31.8 mmol/L      Calcium 7.7 mg/dL      eGFR Non African Amer 85 mL/min/1.73      BUN/Creatinine Ratio 32.4     Anion Gap 4.2 mmol/L     Narrative:      GFR Normal >60  Chronic Kidney Disease <60  Kidney Failure <15      Magnesium [[id number]]  (Normal) Collected: 08/07/21 2052    Specimen: Blood Updated: 08/07/21 2126     Magnesium 1.9 mg/dL           Imaging Results (Last 48 Hours)     Procedure Component Value Units Date/Time    XR Chest 1 View [[id number]] Collected: 08/09/21 0520     Updated: 08/09/21 0522    Narrative:      CR Chest 1 Vw    INDICATION:   Respiratory failure. Follow-up.     COMPARISON:    8/7/2021    FINDINGS:  Single portable AP view(s) of the chest.    Endotracheal tube is in the mid trachea. Heart size remains normal. Infiltrates at the right lung base are not significantly changed. Infiltrates at the left lung base have worsened. No pneumothorax is seen.       Impression:      Worsening left lower lung infiltrates with stable infiltrates at the right base.    Signer Name: Mirza Ruiz MD   Signed: 8/9/2021 5:20 AM   Workstation Name: [email or web address]    [facility]    XR Abdomen KUB [[id number]] Collected: 08/08/21 0901     Updated: 08/08/21 0903    Narrative:      ABDOMEN, 8/8/2021 (08:20)    HISTORY:    Feeding tube placement.      TECHNIQUE:  AP portable radiograph of the upper abdomen.      FINDINGS:  On the 2nd of two images, the Dobbhoff tube tip is in midportion of the  stomach.    Dense consolidation of the left lower lobe and patchy right base pulmonary infiltrate are visible.      Impression:      Dobbhoff tube tip in the mid stomach.    Signer Name: Truong Gustafson MD   Signed: 2021 9:01 AM   Workstation Name: [id number]    [facility]    XR Chest 1 View [[id number]] Collected: 21     Updated: 21    Narrative:      CR Chest 1 Vw    INDICATION:   Endotracheal tube placement     COMPARISON:    None available.    FINDINGS:  Single portable AP view(s) of the chest.    The tip of the endotracheal tube is approximately 5 cm above the waldo. This is in satisfactory position.    The heart and mediastinal contours are normal.    The lungs demonstrate patchy airspace disease most prominent in the right lung base. No obvious pleural fluid. No pneumothorax.     IMPRESSION:  Endotracheal tube in satisfactory position.    Patchy bilateral airspace disease most prominent in the right lung base.    Signer Name: Cole Mcconnell MD   Signed: 2021 9:58 PM   Workstation Name: [id number]    [facility]        ECG/EMG Results (last 48 hours)     ** No results found for the last 48 hours. **        Operative/Procedure Notes (last 48 hours) (Notes from 21 1726 through 21 1726)    No notes of this type exist for this encounter.          Physician Progress Notes (last 48 hours) (Notes from 21 1726 through 21 1726)      Efe Rubi MD at 21 1315              [facility] INPATIENT PROGRESS NOTE         [facility]    2021      PATIENT IDENTIFICATION:  Name: Flores Marie ADMIT: 2021   : 1965  PCP: Sayda Burnett MD    MRN: [id number] LOS: 2 days   AGE/SEX: 56 y.o. female  ROOM: ICU6                     LOS 2    Reason for visit: Confusion, encephalopathy       SUBJECTIVE:    Remains intubated and sedated at this time.   Review of Systems   Unable to  perform ROS: Intubated         Objective   OBJECTIVE:    Vital Sign Min/Max for last 24 hours  Temp  Min: 97.4 °F (36.3 °C)  Max: 98.8 °F (37.1 °C)   BP  Min: 92/66  Max: 133/87   Pulse  Min: 109  Max: 124   Resp  Min: 16  Max: 22   SpO2  Min: 90 %  Max: 96 %   No data recorded   Weight  Min: 64.9 kg (143 lb)  Max: 64.9 kg (143 lb)       FiO2 (%):  [33 %-50 %] 49 %  S RR:  [16-22] 22  PEEP/CPAP (cm H2O):  [5 cm H20] 5 cm H20  MD SUP:  [0 cm H20] 0 cm H20  MAP (cm H2O):  [8.9-11] 11           FiO2 (%): 49 %     Body mass index is 23.8 kg/m².    Intake/Output Summary (Last 24 hours) at 8/9/2021 1315  Last data filed at 8/9/2021 0500  Gross per 24 hour   Intake 1071 ml   Output 925 ml   Net 146 ml         Exam:  GEN:  Intubated and sedated, No distress, appears stated age  EYES:   PERRL, anicteric sclerae  ENT:    External ears/nose normal, OP clear, ETT in place  NECK:  No adenopathy, midline trachea  LUNGS: Mechanical sounds, but clear  CV:  Normal S1S2, without murmur  ABD:  Nontender, nodistended, no hepatosplenomegaly, +BS  EXT:  No edema.  No cyanosis or clubbing.  No mottling and normal cap refill.        Scheduled meds:  cefepime, 2 g, Intravenous, Q8H  chlorhexidine, 15 mL, Mouth/Throat, Q12H  enoxaparin, 40 mg, Subcutaneous, Nightly  metroNIDAZOLE, 500 mg, Intravenous, Q8H  pantoprazole, 40 mg, Intravenous, Q24H  sodium chloride, 10 mL, Intravenous, Q12H  vancomycin, 20 mg/kg, Intravenous, Once  vancomycin, 1,000 mg, Intravenous, Q12H      IV meds:                      Pharmacy to dose vancomycin,   propofol, 5-50 mcg/kg/min (Order-Specific), Last Rate: 35 mcg/kg/min (08/09/21 1154)      Data Review:  Results from last 7 days   Lab Units 08/09/21  0419 08/08/21  5307 08/08/21 0427 08/07/21 2052 08/07/21 2052   SODIUM mmol/L 138  --  135*  --  136   POTASSIUM mmol/L 4.7  --  5.1  --  4.3   CHLORIDE mmol/L 99  --  98  --  100   CO2 mmol/L 34.8*  --  33.0*  --  31.8*   BUN mg/dL 13  --  20  --  23*    CREATININE mg/dL 0.52*  --  0.62  --  0.71   GLUCOSE mg/dL 150*   < > 99   < > 100*   CALCIUM mg/dL 8.4*  --  8.6  --  7.7*    < > = values in this interval not displayed.         Estimated Creatinine Clearance: 125.2 mL/min (A) (by C-G formula based on SCr of 0.52 mg/dL (L)).  Results from last 7 days   Lab Units 08/09/21  0419 08/08/21  0427 08/07/21 2053   WBC 10*3/mm3 13.40* 12.16* 11.81*   HEMOGLOBIN g/dL 13.0 14.6 13.7   PLATELETS 10*3/mm3 203 222 195         Results from last 7 days   Lab Units 08/09/21 0419   ALT (SGPT) U/L 13   AST (SGOT) U/L 20     Results from last 7 days   Lab Units 08/09/21  0800 08/08/21  0545 08/07/21  2110   PH, ARTERIAL pH units 7.243* 7.287* 7.366   PO2 ART mm Hg 77.2* 71.9* 78.4*   PCO2, ARTERIAL mm Hg 94.0* 74.4* 59.2*   HCO3 ART mmol/L 40.5* 35.5* 33.9*     Results from last 7 days   Lab Units 08/09/21 0419   PROCALCITONIN ng/mL 0.03         Hemoglobin A1C   Date/Time Value Ref Range Status   08/07/2021 2052 5.70 (H) 4.80 - 5.60 % Final              Active Hospital Problems    Diagnosis  POA   • Respiratory failure with hypoxia (CMS/[facility]) [J96.91]  Yes      Resolved Hospital Problems   No resolved problems to display.         ASSESSMENT:  Acute encephalopathy  Acute respiratory failure  Pneumonia, RVP neg, MRSA screen neg  History of illicit drug abuse  Leukocytosis  COPD  Smoker     Recommendations: Patient is critically ill.  - We will continue to adjust mechanical ventilation, increased RR this morning with significant hypercapnea, ETCO2 seemed to improve significantly afterwards, will need to monitor   - would recheck VBG this afternoon, some permissive hypercapnea is ok but not to this level, may need to increase rate further    - she does seem to have chronic hypercapnea with bicarb at 34      - continue tube feeds     - We will adjust sedation and analgesia to see if we can awaken the patient tomorrow and check her neurologic status.  If her neurologic status is  "intact and appropriate for weaning trial we will proceed with such.    - We will turn off sedation tomorrow morning at 6 AM and use IV fentanyl push as needed for agitation.    - Continue cefepime/metronidazole for LLL infitrate, ok to stop vanc at this time with neg MRSA screen, sputum pending     Total critical care time 36 minutes excluding any separately billable procedure time     Patient was placed in face mask upon entering room and kept mask on throughout our encounter. I wore full protective equipment throughout this patient encounter including a face mask, gown and gloves. Hand hygiene was performed before donning protective equipment and after removal when leaving the room.          Efe Rubi MD  Pulmonary and Critical Care Medicine  [facility], [facility]  8/9/2021  13:15 EDT     Electronically signed by Efe Rubi MD at 08/09/21 1327     Tra Diaz MD at 08/09/21 0901          Hospitalist Team      Patient Care Team:  Sayda Burnett MD as PCP - General      Chief Complaint: Follow-up Acute H/H Resp Failure    Subjective    No acute events overnight.  Staff report she is moving her right hand/arm.    Objective    Vital Signs  Temp:  [97.4 °F (36.3 °C)-98.8 °F (37.1 °C)] 97.9 °F (36.6 °C)  Heart Rate:  [109-124] 111  Resp:  [16-20] 16  BP: ()/(66-91) 131/82  FiO2 (%):  [33 %-50 %] 49 %  Oxygen Therapy  SpO2: 95 %  Pulse Oximetry Type: Continuous  Device (Oxygen Therapy): ventilator  Oxygen Concentration (%): 50  ETCO2 (mmHg): 61 mmHg}    Flowsheet Rows      First Filed Value   Admission Height  165.1 cm (65\") Documented at 08/07/2021 2000   Admission Weight  65.1 kg (143 lb 9.6 oz) Documented at 08/07/2021 2000        Physical Exam:    General: Appears older than stated age.  HEENT: PERRL.  ETT fixed at 25cm at the lip  Lungs: Diminished breath sounds.  No wheeze or rhonchi appreciated.  No accessory use.  CV: Regular rate and rhythm.  No murmurs appreciated.  " Radial and pedal pulses are 2+ and symmetric.  Abdomen: Soft w/ diminished bowel sounds.  MSK: No C/C/E.  No asymmetry.  Psych: Intubated and sedated.    Results Review:     I reviewed the patient's new clinical results.    Lab Results (last 24 hours)     Procedure Component Value Units Date/Time    Respiratory Culture - Aspirate, ET Suction [[id number]] Collected: 08/08/21 0438    Specimen: Aspirate from ET Suction Updated: 08/09/21 0917     Respiratory Culture Scant growth (1+) Normal Respiratory Ena: NO S.aureus/MRSA or Pseudomonas aeruginosa     Gram Stain Many (4+) WBCs per low power field      Rare (1+) Gram positive cocci in pairs      Rare (1+) Normal respiratory ena    Blood Gas, Arterial - [[id number]]  (Abnormal) Collected: 08/09/21 0800    Specimen: Arterial Blood Updated: 08/09/21 0816     Site Right Radial     Kris's Test Positive     pH, Arterial 7.243 pH units      Comment: 84 Value below reference range        pCO2, Arterial 94.0 mm Hg      Comment: 86 Value above critical limit        pO2, Arterial 77.2 mm Hg      Comment: 84 Value below reference range        HCO3, Arterial 40.5 mmol/L      Comment: 83 Value above reference range        Base Excess, Arterial 9.2 mmol/L      Comment: 83 Value above reference range        O2 Saturation, Arterial 95.1 %      Hemoglobin, Blood Gas 13.9 g/dL      Temperature 37.0 C      Barometric Pressure for Blood Gas 740 mmHg      Modality Ventilator     FIO2 50 %      Ventilator Mode AC     Set Tidal Volume 400     Set Mech Resp Rate 16.0     PEEP 5.0     Notified Who RB AND VERIFIED     Notified By [id number]     Notified Time 08/09/2021 08:17     Collected by [id number]     Comment: Meter: [id number]     :  [id number]        pCO2, Temperature Corrected 94.0 mm Hg      pH, Temp Corrected 7.243 pH Units      pO2, Temperature Corrected 77.2 mm Hg     BNP [[id number]]  (Normal) Collected: 08/09/21 0419    Specimen: Blood Updated: 08/09/21 0554     proBNP 228.2  pg/mL     Narrative:      Among patients with dyspnea, NT-proBNP is highly sensitive for the detection of acute congestive heart failure. In addition NT-proBNP of <300 pg/ml effectively rules out acute congestive heart failure with 99% negative predictive value.    Results may be falsely decreased if patient taking Biotin.      Procalcitonin [[id number]]  (Normal) Collected: 08/09/21 0419    Specimen: Blood Updated: 08/09/21 0551     Procalcitonin 0.03 ng/mL     Narrative:      Results may be falsely decreased if patient taking Biotin.     Magnesium [[id number]]  (Normal) Collected: 08/09/21 0419    Specimen: Blood Updated: 08/09/21 0516     Magnesium 2.0 mg/dL     Comprehensive Metabolic Panel [[id number]]  (Abnormal) Collected: 08/09/21 0419    Specimen: Blood Updated: 08/09/21 0516     Glucose 150 mg/dL      BUN 13 mg/dL      Creatinine 0.52 mg/dL      Sodium 138 mmol/L      Potassium 4.7 mmol/L      Chloride 99 mmol/L      CO2 34.8 mmol/L      Calcium 8.4 mg/dL      Total Protein 5.5 g/dL      Albumin 2.60 g/dL      ALT (SGPT) 13 U/L      AST (SGOT) 20 U/L      Alkaline Phosphatase 68 U/L      Total Bilirubin 0.2 mg/dL      eGFR Non African Amer 122 mL/min/1.73      Globulin 2.9 gm/dL      A/G Ratio 0.9 g/dL      BUN/Creatinine Ratio 25.0     Anion Gap 4.2 mmol/L     Narrative:      GFR Normal >60  Chronic Kidney Disease <60  Kidney Failure <15      CBC & Differential [[id number]]  (Abnormal) Collected: 08/09/21 0419    Specimen: Blood Updated: 08/09/21 0513    Narrative:      The following orders were created for panel order CBC & Differential.  Procedure                               Abnormality         Status                     ---------                               -----------         ------                     CBC Auto Differential[[id number]]        Abnormal            Final result               Scan Slide[[id number]]                                       Final result                 Please view results for  these tests on the individual orders.    Scan Slide [[id number]] Collected: 08/09/21 0419    Specimen: Blood Updated: 08/09/21 0513     Macrocytes Slight/1+     WBC Morphology Normal     Platelet Morphology Normal    CBC Auto Differential [[id number]]  (Abnormal) Collected: 08/09/21 0419    Specimen: Blood Updated: 08/09/21 0500     WBC 13.40 10*3/mm3      RBC 4.08 10*6/mm3      Hemoglobin 13.0 g/dL      Hematocrit 42.9 %      .1 fL      MCH 31.9 pg      MCHC 30.3 g/dL      RDW 12.9 %      RDW-SD 50.9 fl      MPV 9.8 fL      Platelets 203 10*3/mm3      Neutrophil % 86.3 %      Lymphocyte % 7.3 %      Monocyte % 5.7 %      Eosinophil % 0.0 %      Basophil % 0.1 %      Immature Grans % 0.6 %      Neutrophils, Absolute 11.57 10*3/mm3      Lymphocytes, Absolute 0.98 10*3/mm3      Monocytes, Absolute 0.76 10*3/mm3      Eosinophils, Absolute 0.00 10*3/mm3      Basophils, Absolute 0.01 10*3/mm3      Immature Grans, Absolute 0.08 10*3/mm3     Blood Culture - Blood, Arm, Left [[id number]] Collected: 08/07/21 2243    Specimen: Blood from Arm, Left Updated: 08/08/21 2315     Blood Culture No growth at 24 hours    Blood Culture - Blood, Arm, Left [[id number]] Collected: 08/07/21 2054    Specimen: Blood from Arm, Left Updated: 08/08/21 2115     Blood Culture No growth at 24 hours    Respiratory Panel, PCR (WITHOUT COVID) - Swab, Nasopharynx [[id number]] Collected: 08/08/21 1952    Specimen: Swab from Nasopharynx Updated: 08/08/21 1957    Respiratory Culture - Sputum, ET Suction [[id number]] Collected: 08/08/21 1337    Specimen: Sputum from ET Suction Updated: 08/08/21 1827     Gram Stain Moderate (3+) WBCs seen      No epithelial cells seen      Rare (1+) Normal respiratory ena    Legionella Antigen, Urine - Urine, Urine, Clean Catch [[id number]]  (Normal) Collected: 08/08/21 1316    Specimen: Urine, Clean Catch Updated: 08/08/21 1346     LEGIONELLA ANTIGEN, URINE Negative    S. Pneumo Ag Urine or CSF - Urine, Urine, Clean  Catch [[id number]]  (Normal) Collected: 08/08/21 1316    Specimen: Urine, Clean Catch Updated: 08/08/21 1346     Strep Pneumo Ag Negative    Urine Drug Screen - Urine, Catheter [[id number]]  (Normal) Collected: 08/08/21 1315    Specimen: Urine, Catheter Updated: 08/08/21 1335     THC, Screen, Urine Negative     Phencyclidine (PCP), Urine Negative     Cocaine Screen, Urine Negative     Methamphetamine, Ur Negative     Opiate Screen Negative     Amphetamine Screen, Urine Negative     Benzodiazepine Screen, Urine Negative     Tricyclic Antidepressants Screen Negative     Methadone Screen, Urine Negative     Barbiturates Screen, Urine Negative     Oxycodone Screen, Urine Negative     Propoxyphene Screen Negative     Buprenorphine, Screen, Urine Negative    Narrative:      Urine drug screen results are to be used for medical purposes only.  They are not to be used for legal purposes such as employment testing.  Negative results do not necessarily mean the complete absence of a subtance, but rather that the result is less than the cutoff for that substance.  Positive results are unconfirmed and considered Preliminary Positive.  [facility] does not automatically confirm Postitive Unconfirmed results.  The physician may request (order) an Unconfirmed Positive result to be sent out for confirmation.      Negative Thresholds for Drugs Screened:    THC screen, urine                          50 ng/ml  Phenycyclidine (PCP), urine                25 ng/ml  Cocaine screen, urine                     150 ng/ml  Methamphetamine, urine                    500 ng/ml  Opiate screen, urine                      100 ng/ml  Amphetamine screen, urine                 500 ng/ml  Benzodiazepine screen, urine              150 ng/ml  Tricyclic Antidepressants screen, urine   300 ng/ml  Methadone screen, urine                   200 ng/ml  Barbiturates screen, urine                200 ng/ml  Oxycodone screen, urine                   100  ng/ml  Propoxyphene screen, urine                300 ng/ml  Buprenorphine screen, urine                10 ng/ml          Imaging Results (Last 24 Hours)     Procedure Component Value Units Date/Time    XR Chest 1 View [[id number]] Collected: 08/09/21 0520     Updated: 08/09/21 0522    Narrative:      CR Chest 1 Vw    INDICATION:   Respiratory failure. Follow-up.     COMPARISON:    8/7/2021    FINDINGS:  Single portable AP view(s) of the chest.    Endotracheal tube is in the mid trachea. Heart size remains normal. Infiltrates at the right lung base are not significantly changed. Infiltrates at the left lung base have worsened. No pneumothorax is seen.       Impression:      Worsening left lower lung infiltrates with stable infiltrates at the right base.    Signer Name: Mirza Ruiz MD   Signed: 8/9/2021 5:20 AM   Workstation Name: [id number]    [facility]          Xray reviewed personally by physician.        Medication Review:   I have reviewed the patient's current medication list    Current Facility-Administered Medications:   •  cefepime (MAXIPIME) IVPB 2 g/50ml D5W (premix), 2 g, Intravenous, Q8H, Tra Diaz MD, 2 g at 08/09/21 0831  •  chlorhexidine (PERIDEX) 0.12 % solution 15 mL, 15 mL, Mouth/Throat, Q12H, Jhon Mcgill DO, 15 mL at 08/09/21 0831  •  enoxaparin (LOVENOX) syringe 40 mg, 40 mg, Subcutaneous, Nightly, Jhon Mcgill DO, 40 mg at 08/08/21 2034  •  fentaNYL citrate (PF) (SUBLIMAZE) injection 100 mcg, 100 mcg, Intravenous, Q2H PRN, Guillermo Kim MD, 100 mcg at 08/08/21 1925  •  metroNIDAZOLE (FLAGYL) 500 mg/100mL IVPB, 500 mg, Intravenous, Q8H, Tra Diaz MD, 500 mg at 08/09/21 0831  •  nitroglycerin (NITROSTAT) SL tablet 0.4 mg, 0.4 mg, Sublingual, Q5 Min PRN, Jhon Mcgill DO  •  pantoprazole (PROTONIX) injection 40 mg, 40 mg, Intravenous, Q24H, Jhon Mcgill DO, 40 mg at 08/09/21 0830  •  Pharmacy to dose vancomycin, , Does not  apply, Continuous PRN, Tiffanie Gomez MD  •  propofol (DIPRIVAN) infusion 10 mg/mL 100 mL, 5-50 mcg/kg/min (Order-Specific), Intravenous, Titrated, Jhon Mcgill DO, Last Rate: 19.2 mL/hr at 08/09/21 0552, 40 mcg/kg/min at 08/09/21 0552  •  sodium chloride 0.9 % flush 10 mL, 10 mL, Intravenous, PRN, Jhon Mcgill DO  •  sodium chloride 0.9 % flush 10 mL, 10 mL, Intravenous, Q12H, Jhon Mcgill DO, 10 mL at 08/08/21 2034  •  sodium chloride 0.9 % infusion 40 mL, 40 mL, Intravenous, PRN, Jhon Mcgill DO  •  vancomycin (VANCOCIN) 1,250 mg in sodium chloride 0.9 % 250 mL IVPB, 20 mg/kg, Intravenous, Once, Tiffanie Gomez MD, Currently Infusing at 08/08/21 0154  •  vancomycin 1 g/250 mL 0.9% NS (vial-mate), 1,000 mg, Intravenous, Q12H, Tiffanie Gomez MD, 1,000 mg at 08/09/21 0238      Assessment/Plan     1.  Acute H/H Respiratory Failure: CXR worse.  Antibiotic strategy changed yesterday.  Micro, as of yet, unreveling.  Procalcitonin is negative which could indeed support aspiration.  Will D/C Vanc as MRSA was negative.  Acidosis worse on ABG.  Vent changes per pulmonary.     2.  ?Hx/O CVA: See note from yesterday.     3.  Hypertension: At goal on exam.  Continue to monitor trend.    Plan for disposition: Predicated on hospital course.    Tra Diaz MD  08/09/21  09:25 EDT    Electronically signed by Tra Diaz MD at 08/09/21 7339     Tra Diaz MD at 08/08/21 0830          Hospitalist Team      Patient Care Team:  Sayda Burnett MD as PCP - General      Chief Complaint:  Follow-up Acute H/H Resp Failure    Subjective    No acute events overnight.  Patient's daughter reported to nurse that her mother has been having trouble moving her right side.  No work-up has been pursued to my understanding.    Objective    Vital Signs  Temp:  [96.5 °F (35.8 °C)-98.7 °F (37.1 °C)] 98.7 °F (37.1 °C)  Heart Rate:  [100-123] 123  Resp:   "[12-20] 18  BP: (113-143)/() 120/79  FiO2 (%):  [50 %-75 %] 50 %  Oxygen Therapy  SpO2: 94 %  Pulse Oximetry Type: Continuous  Device (Oxygen Therapy): ventilator  Oxygen Concentration (%): 50  ETCO2 (mmHg): 55 mmHg}  Flowsheet Rows      First Filed Value   Admission Height  165.1 cm (65\") Documented at 08/07/2021 2000   Admission Weight  65.1 kg (143 lb 9.6 oz) Documented at 08/07/2021 2000        Physical Exam:    General: Appears much older than stated age.  Well nourished.  HEENT: PERRL.  No conjunctival injection.  ETT fixed at the 25cm at the lip.  Lungs: Breath sounds are diminished throughout all fields.  No accessory use noted.  CV: Regular rate and rhythm.  No murmur appreciated.  Radial pulses are 2+ and symmetric.  Abdomen: Soft w/ active bowel sounds.  MSK: No C/C/E.  No asymmetry.  Skin:  No rash.  Psych: Intubated and sedated.    Results Review:     I reviewed the patient's new clinical results.    Lab Results (last 24 hours)     Procedure Component Value Units Date/Time    Respiratory Culture - Aspirate, ET Suction [[id number]] Collected: 08/08/21 0438    Specimen: Aspirate from ET Suction Updated: 08/08/21 0726     Gram Stain Many (4+) WBCs per low power field      Rare (1+) Gram positive cocci in pairs      Rare (1+) Normal respiratory ena    Blood Gas, Arterial - [[id number]]  (Abnormal) Collected: 08/08/21 0545    Specimen: Arterial Blood Updated: 08/08/21 0553     Site Right Brachial     Kris's Test N/A     pH, Arterial 7.287 pH units      Comment: 84 Value below reference range        pCO2, Arterial 74.4 mm Hg      Comment: 86 Value above critical limit        pO2, Arterial 71.9 mm Hg      Comment: 84 Value below reference range        HCO3, Arterial 35.5 mmol/L      Comment: 83 Value above reference range        Base Excess, Arterial 5.9 mmol/L      Comment: 83 Value above reference range        O2 Saturation, Arterial 93.4 %      Comment: 84 Value below reference range        " Hemoglobin, Blood Gas 14.6 g/dL      Temperature 37.0 C      Barometric Pressure for Blood Gas 740 mmHg      Modality Ventilator     FIO2 50 %      Ventilator Mode Volume Support     Set Tidal Volume 400     Set Mech Resp Rate 16.0     PEEP 5.0     Notified Who RB AND V DR ENNIS     Notified By [id number]     Notified Time 08/08/2021 05:54     Collected by [id number]     Comment: Meter: [id number]     :  [id number]        pCO2, Temperature Corrected 74.4 mm Hg      pH, Temp Corrected 7.287 pH Units      pO2, Temperature Corrected 71.9 mm Hg     Phosphorus [[id number]]  (Normal) Collected: 08/08/21 0427    Specimen: Blood Updated: 08/08/21 0549     Phosphorus 3.5 mg/dL     Basic Metabolic Panel [[id number]]  (Abnormal) Collected: 08/08/21 0427    Specimen: Blood Updated: 08/08/21 0500     Glucose 99 mg/dL      BUN 20 mg/dL      Creatinine 0.62 mg/dL      Sodium 135 mmol/L      Potassium 5.1 mmol/L      Comment: Slight hemolysis detected by analyzer. Results may be affected.        Chloride 98 mmol/L      CO2 33.0 mmol/L      Calcium 8.6 mg/dL      eGFR Non African Amer 100 mL/min/1.73      BUN/Creatinine Ratio 32.3     Anion Gap 4.0 mmol/L     Narrative:      GFR Normal >60  Chronic Kidney Disease <60  Kidney Failure <15      Magnesium [[id number]]  (Normal) Collected: 08/08/21 0427    Specimen: Blood Updated: 08/08/21 0454     Magnesium 2.0 mg/dL     CBC & Differential [[id number]]  (Abnormal) Collected: 08/08/21 0427    Specimen: Blood Updated: 08/08/21 0437    Narrative:      The following orders were created for panel order CBC & Differential.  Procedure                               Abnormality         Status                     ---------                               -----------         ------                     CBC Auto Differential[[id number]]        Abnormal            Final result                 Please view results for these tests on the individual orders.    CBC Auto Differential [[id number]]  (Abnormal)  Collected: 08/08/21 0427    Specimen: Blood Updated: 08/08/21 0437     WBC 12.16 10*3/mm3      RBC 4.67 10*6/mm3      Hemoglobin 14.6 g/dL      Hematocrit 47.2 %      .1 fL      MCH 31.3 pg      MCHC 30.9 g/dL      RDW 13.2 %      RDW-SD 49.1 fl      MPV 10.0 fL      Platelets 222 10*3/mm3      Neutrophil % 83.8 %      Lymphocyte % 9.5 %      Monocyte % 6.0 %      Eosinophil % 0.0 %      Basophil % 0.1 %      Immature Grans % 0.6 %      Neutrophils, Absolute 10.19 10*3/mm3      Lymphocytes, Absolute 1.16 10*3/mm3      Monocytes, Absolute 0.73 10*3/mm3      Eosinophils, Absolute 0.00 10*3/mm3      Basophils, Absolute 0.01 10*3/mm3      Immature Grans, Absolute 0.07 10*3/mm3      nRBC 0.0 /100 WBC     MRSA Screen Culture (Outpatient) - Swab, Nares [[id number]] Collected: 08/08/21 0150    Specimen: Swab from Nares Updated: 08/08/21 0207    Blood Culture - Blood, Arm, Left [[id number]] Collected: 08/07/21 2243    Specimen: Blood from Arm, Left Updated: 08/07/21 2305    TSH [[id number]]  (Normal) Collected: 08/07/21 2052    Specimen: Blood Updated: 08/07/21 2138     TSH 0.278 uIU/mL     Hemoglobin A1c [[id number]]  (Abnormal) Collected: 08/07/21 2052    Specimen: Blood Updated: 08/07/21 2132     Hemoglobin A1C 5.70 %     Narrative:      Hemoglobin A1C Ranges:    Increased Risk for Diabetes  5.7% to 6.4%  Diabetes                     >= 6.5%  Diabetic Goal                < 7.0%    Basic Metabolic Panel [[id number]]  (Abnormal) Collected: 08/07/21 2052    Specimen: Blood Updated: 08/07/21 2126     Glucose 100 mg/dL      BUN 23 mg/dL      Creatinine 0.71 mg/dL      Sodium 136 mmol/L      Potassium 4.3 mmol/L      Chloride 100 mmol/L      CO2 31.8 mmol/L      Calcium 7.7 mg/dL      eGFR Non African Amer 85 mL/min/1.73      BUN/Creatinine Ratio 32.4     Anion Gap 4.2 mmol/L     Narrative:      GFR Normal >60  Chronic Kidney Disease <60  Kidney Failure <15      Magnesium [[id number]]  (Normal) Collected: 08/07/21 2052     Specimen: Blood Updated: 08/07/21 2126     Magnesium 1.9 mg/dL     Blood Culture - Blood, Arm, Left [[id number]] Collected: 08/07/21 2054    Specimen: Blood from Arm, Left Updated: 08/07/21 2110    CBC Auto Differential [[id number]]  (Abnormal) Collected: 08/07/21 2053    Specimen: Blood Updated: 08/07/21 2104     WBC 11.81 10*3/mm3      RBC 4.30 10*6/mm3      Hemoglobin 13.7 g/dL      Hematocrit 43.4 %      .9 fL      MCH 31.9 pg      MCHC 31.6 g/dL      RDW 13.2 %      RDW-SD 48.9 fl      MPV 9.9 fL      Platelets 195 10*3/mm3      Neutrophil % 83.8 %      Lymphocyte % 8.1 %      Monocyte % 7.6 %      Eosinophil % 0.0 %      Basophil % 0.2 %      Immature Grans % 0.3 %      Neutrophils, Absolute 9.89 10*3/mm3      Lymphocytes, Absolute 0.96 10*3/mm3      Monocytes, Absolute 0.90 10*3/mm3      Eosinophils, Absolute 0.00 10*3/mm3      Basophils, Absolute 0.02 10*3/mm3      Immature Grans, Absolute 0.04 10*3/mm3      nRBC 0.0 /100 WBC           Imaging Results (Last 24 Hours)     Procedure Component Value Units Date/Time    XR Chest 1 View [[id number]] Collected: 08/07/21 2158     Updated: 08/07/21 2202    Narrative:      CR Chest 1 Vw    INDICATION:   Endotracheal tube placement     COMPARISON:    None available.    FINDINGS:  Single portable AP view(s) of the chest.    The tip of the endotracheal tube is approximately 5 cm above the waldo. This is in satisfactory position.    The heart and mediastinal contours are normal.    The lungs demonstrate patchy airspace disease most prominent in the right lung base. No obvious pleural fluid. No pneumothorax.     IMPRESSION:  Endotracheal tube in satisfactory position.    Patchy bilateral airspace disease most prominent in the right lung base.    Signer Name: Cole Mcconnell MD   Signed: 8/7/2021 9:58 PM   Workstation Name: [id number]    [facility]          Medication Review:   I have reviewed the patient's current medication list    Current  Facility-Administered Medications:   •  cefepime (MAXIPIME) IVPB 2 g/50ml D5W (premix), 2 g, Intravenous, Once, Tra Diaz MD  •  cefepime (MAXIPIME) IVPB 2 g/50ml D5W (premix), 2 g, Intravenous, Q8H, Tra Diaz MD  •  chlorhexidine (PERIDEX) 0.12 % solution 15 mL, 15 mL, Mouth/Throat, Q12H, Jhon Mcgill DO, 15 mL at 08/07/21 2312  •  enoxaparin (LOVENOX) syringe 40 mg, 40 mg, Subcutaneous, Nightly, Jhon Mcgill DO, 40 mg at 08/07/21 2312  •  fentaNYL citrate (PF) (SUBLIMAZE) injection 25 mcg, 25 mcg, Intravenous, Q30 Min PRN, Jhon Mcgill DO  •  metroNIDAZOLE (FLAGYL) 500 mg/100mL IVPB, 500 mg, Intravenous, Q8H, Tra Diaz MD  •  nitroglycerin (NITROSTAT) SL tablet 0.4 mg, 0.4 mg, Sublingual, Q5 Min PRN, Jhon Mcgill DO  •  pantoprazole (PROTONIX) injection 40 mg, 40 mg, Intravenous, Q24H, Jhon Mcgill DO, 40 mg at 08/07/21 2311  •  Pharmacy to dose vancomycin, , Does not apply, Continuous PRN, Tiffanie Gomez MD  •  propofol (DIPRIVAN) infusion 10 mg/mL 100 mL, 5-50 mcg/kg/min (Order-Specific), Intravenous, Titrated, Jhon Mcgill DO, Last Rate: 7.2 mL/hr at 08/08/21 0515, 15 mcg/kg/min at 08/08/21 0515  •  sodium chloride 0.9 % flush 10 mL, 10 mL, Intravenous, PRN, Jhon Mcgill DO  •  sodium chloride 0.9 % flush 10 mL, 10 mL, Intravenous, Q12H, Jhon Mcgill DO, 10 mL at 08/07/21 2313  •  sodium chloride 0.9 % infusion 40 mL, 40 mL, Intravenous, PRN, Jhon Mcgill DO  •  vancomycin (VANCOCIN) 1,250 mg in sodium chloride 0.9 % 250 mL IVPB, 20 mg/kg, Intravenous, Once, Tiffanie Gomez MD, Currently Infusing at 08/08/21 0154      Assessment/Plan     1.  Acute H/H Respiratory Failure: Etiology unclear, but suspect pneumonia.  Will add urinary antigens.  Changed antibiotic strategy to mitigate renal injury.  Will conitnue to follow.    2.  ?Hx/O CVA: I reviewed the notes from her stay at [facility] in June of  this year.  It appears she did not suffer an acute stroke.  Symptoms were felt to be due to Complex Migraine versus Chronic Pain.  Apparently, she has been having symptoms since May 12th of this year.  I've added a drug screen with a reported history of meth use.    3.  Hypertension: At goal on exam.  Continue to monitor trend.    Plan for disposition: Predicated on hospital course.    Tra Diaz MD  08/08/21  08:30 EDT    Electronically signed by Tra Diaz MD at 08/08/21 1155          Consult Notes (last 48 hours) (Notes from 08/07/21 1726 through 08/09/21 1726)      Guillermo Kim MD at 08/08/21 1331      Consult Orders    1. Inpatient Pulmonology Consult [[id number]] ordered by Tiffanie Gomez MD at 08/08/21 0456               Group: [facility]         CONSULT NOTE    Patient Identification:  Flores Marie  56 y.o.  female  1965  [id number]            Requesting physician: Dr. Юлия Lyles    Reason for Consultation: Respiratory failure requiring mechanical ventilation    CC: Confusion    History of Present Illness:  56-year-old  female who presents with confusion.  All the history is obtained from her review of H&P dictated by Dr. Lyles.  Apparently her symptoms started 3 days ago and she was taken to the emergency room at [facility].  This was prompted by her boyfriend who states that the patient has been confused for 2 to 3 days, not eating or drinking and has history of drug abuse.  She was found to have altered mental status and respiratory distress by her daughter.  The patient was intubated upon arrival in this emergency room and she is now in the ICU, on mechanical ventilation.  The patient has a history of alcohol abuse, tobacco abuse and methamphetamine abuse.  Outside records reviewed, notes from pain clinic dated March 25, 2015 reviewed.      Review of Systems   Unable to perform ROS: Intubated       Past Medical History:  Past  Medical History:   Diagnosis Date   • Arthritis    • Asthma    • COPD (chronic obstructive pulmonary disease) (CMS/[facility])    • Elevated cholesterol    • Hypertension    • Stroke (CMS/[facility])    Illicit drug use  Chronic pain syndrome  COPD  Smoker    Past Surgical History:  History reviewed. No pertinent surgical history.     Home Meds:  Medications Prior to Admission   Medication Sig Dispense Refill Last Dose   • atorvastatin (LIPITOR) 40 MG tablet Take 40 mg by mouth Daily.   8/6/2021 at Unknown time   • baclofen (LIORESAL) 10 MG tablet Take 10 mg by mouth 3 (Three) Times a Day.   8/6/2021 at Unknown time   • fluticasone (FLONASE) 50 MCG/ACT nasal spray 2 sprays into the nostril(s) as directed by provider Daily.   8/6/2021 at Unknown time   • lisinopril (PRINIVIL,ZESTRIL) 20 MG tablet Take 20 mg by mouth Daily.   8/6/2021 at Unknown time   • metoprolol tartrate (LOPRESSOR) 50 MG tablet Take 50 mg by mouth 2 (Two) Times a Day.   8/6/2021 at Unknown time   • pantoprazole (PROTONIX) 40 MG EC tablet Take 40 mg by mouth Daily.   8/6/2021 at Unknown time   • QUEtiapine (SEROquel) 100 MG tablet Take 100 mg by mouth Every Night.   8/6/2021 at Unknown time       Allergies:  Allergies   Allergen Reactions   • Contrast Dye Hives, Nausea And Vomiting and Swelling   • Gabapentin Swelling   • Naproxen Shortness Of Breath   • Nsaids Swelling   • Atorvastatin Swelling     Patient states she was given atorvastatin in the hospital and she had throat irritation and swelling  Patient states she was given atorvastatin in the hospital and she had throat irritation and swelling     • Cyclobenzaprine Unknown - Low Severity   • Iodinated Diagnostic Agents Rash       Social History:   Social History     Socioeconomic History   • Marital status:      Spouse name: Not on file   • Number of children: Not on file   • Years of education: Not on file   • Highest education level: Not on file   Tobacco Use   • Smoking status: Current Every Day  "Smoker     Packs/day: 0.50     Years: 10.00     Pack years: 5.00     Types: Cigarettes   • Tobacco comment: intubated/sedated   Substance and Sexual Activity   • Alcohol use: Not Currently   • Drug use: Not Currently   • Sexual activity: Not Currently       Family History:  History reviewed. No pertinent family history.    Physical Exam:  /81   Pulse 115   Temp 98.7 °F (37.1 °C) (Axillary)   Resp 18   Ht 165.1 cm (65\")   Wt 65.2 kg (143 lb 11.8 oz)   LMP  (LMP Unknown)   SpO2 95%   Breastfeeding No   BMI 23.92 kg/m²  Body mass index is 23.92 kg/m². 95% 65.2 kg (143 lb 11.8 oz)  Physical Exam  Constitutional:       Comments: Intubated, sedated   HENT:      Right Ear: External ear normal.      Left Ear: External ear normal.      Nose: Nose normal.      Mouth/Throat:      Comments: Oral exam shows endotracheal tube in good position  Eyes:      Conjunctiva/sclera: Conjunctivae normal.   Neck:      Thyroid: No thyromegaly.      Vascular: No JVD.      Trachea: No tracheal deviation.   Cardiovascular:      Rate and Rhythm: Normal rate and regular rhythm.      Heart sounds: Normal heart sounds. No murmur heard.     Pulmonary:      Effort: Pulmonary effort is normal.      Breath sounds: Normal breath sounds.      Comments: Nonlabored breathing, no wheezing or crackles  Abdominal:      Palpations: Abdomen is soft.      Tenderness: There is no abdominal tenderness. There is no rebound.      Comments: Cannot palpate liver or spleen enlargement   Musculoskeletal:         General: No deformity.      Cervical back: Neck supple. No rigidity.   Skin:     General: Skin is warm.      Findings: No rash.      Comments: No palpable nodules   Neurological:      Comments: Sedated, does not open eyes, grimaces to painful sternal rub, not following commands.  Intubated   Psychiatric:      Comments: Patient is intubated         LABS:  External COVID19   Date Value Ref Range Status   08/04/2020 Not Detected Not Detected Final    "  Comment:     Caution should be exercised when interpreting a result of 'Not Detected'. A result of 'Not Detected' does not rule out COVID-19 and cannot be used as sole basis for treatment or patient management decisions. If COVID-19 is still suspected following a 'Not Detected' result, re-testing should be considered.       Lab Results   Component Value Date    CALCIUM 8.6 08/08/2021    PHOS 3.5 08/08/2021     Results from last 7 days   Lab Units 08/08/21 0427 08/07/21 2053 08/07/21 2052 08/07/21 2052   MAGNESIUM mg/dL 2.0  --   --  1.9   SODIUM mmol/L 135*  --   --  136   POTASSIUM mmol/L 5.1  --   --  4.3   CHLORIDE mmol/L 98  --   --  100   CO2 mmol/L 33.0*  --   --  31.8*   BUN mg/dL 20  --   --  23*   CREATININE mg/dL 0.62  --   --  0.71   GLUCOSE mg/dL 99  --    < > 100*   CALCIUM mg/dL 8.6  --   --  7.7*   WBC 10*3/mm3 12.16* 11.81*  --   --    HEMOGLOBIN g/dL 14.6 13.7  --   --    PLATELETS 10*3/mm3 222 195  --   --     < > = values in this interval not displayed.     Lab Results   Component Value Date    TROPONINI <0.010 07/20/2021                 Results from last 7 days   Lab Units 08/08/21  0545 08/07/21  2110   PH, ARTERIAL pH units 7.287* 7.366   PCO2, ARTERIAL mm Hg 74.4* 59.2*   PO2 ART mm Hg 71.9* 78.4*   O2 SATURATION ART % 93.4*  --    MODALITY  Ventilator Ventilator                 Lab Results   Component Value Date    TSH 0.278 08/07/2021     Estimated Creatinine Clearance: 105.5 mL/min (by C-G formula based on SCr of 0.62 mg/dL).         Imaging: I personally visualized the images of chest x-ray showing endotracheal tube close to the waldo.  There is a right lower lung field infiltrate..      Assessment:  Acute encephalopathy  Acute respiratory failure  Pneumonia  History of illicit drug abuse  Leukocytosis  COPD  Smoker        Recommendations:  Patient is critically ill.  We will continue to adjust mechanical ventilation.  We will adjust sedation and analgesia to see if we can awaken the  patient tomorrow and check her neurologic status.  If her neurologic status is intact and appropriate for weaning trial we will proceed with such.  We will turn off propofol at 7 AM and use IV fentanyl push as needed for agitation.  Continue cefepime and vancomycin for now.  Chest x-ray suggests right lower lung field infiltrate.  Send sputum for culture.  Swab for COVID-19 PCR testing.    Total critical care time 34 minutes excluding any separately billable procedure time    Patient was placed in face mask upon entering room and kept mask on throughout our encounter. I wore full protective equipment throughout this patient encounter including a face mask, gown and gloves. Hand hygiene was performed before donning protective equipment and after removal when leaving the room.    Guillermo Kim MD  8/8/2021  13:31 EDT      Much of this encounter note is an electronic transcription/translation of spoken language to printed text using Dragon Software.    Electronically signed by Guillermo Kim MD at 08/08/21 7120

## 2021-08-09 NOTE — PLAN OF CARE
Goal Outcome Evaluation:  Plan of Care Reviewed With: patient        Progress: improving  Outcome Summary: Pt sedated/intubated, sedation titrated down, lightly sedated; afebrile, Tube feeding per DHT increased to 30cc/hr; continues ST in 110's on monitor.

## 2021-08-09 NOTE — PLAN OF CARE
Goal Outcome Evaluation:  Plan of Care Reviewed With: patient        Progress: no change       The pt has improved throughout the day, improved RR w/less accessory/abd muscle use; pt continues on diprivan @ 30mcg, tube feed rate advanced to goal of 35 mL/hr; sedation vacation performed this am with positive movement in all 4 extremities.

## 2021-08-09 NOTE — PROGRESS NOTES
"Hospitalist Team      Patient Care Team:  Sayda Burnett MD as PCP - General      Chief Complaint: Follow-up Acute H/H Resp Failure    Subjective    No acute events overnight.  Staff report she is moving her right hand/arm.    Objective    Vital Signs  Temp:  [97.4 °F (36.3 °C)-98.8 °F (37.1 °C)] 97.9 °F (36.6 °C)  Heart Rate:  [109-124] 111  Resp:  [16-20] 16  BP: ()/(66-91) 131/82  FiO2 (%):  [33 %-50 %] 49 %  Oxygen Therapy  SpO2: 95 %  Pulse Oximetry Type: Continuous  Device (Oxygen Therapy): ventilator  Oxygen Concentration (%): 50  ETCO2 (mmHg): 61 mmHg}    Flowsheet Rows      First Filed Value   Admission Height  165.1 cm (65\") Documented at 08/07/2021 2000   Admission Weight  65.1 kg (143 lb 9.6 oz) Documented at 08/07/2021 2000        Physical Exam:    General: Appears older than stated age.  HEENT: PERRL.  ETT fixed at 25cm at the lip  Lungs: Diminished breath sounds.  No wheeze or rhonchi appreciated.  No accessory use.  CV: Regular rate and rhythm.  No murmurs appreciated.  Radial and pedal pulses are 2+ and symmetric.  Abdomen: Soft w/ diminished bowel sounds.  MSK: No C/C/E.  No asymmetry.  Psych: Intubated and sedated.    Results Review:     I reviewed the patient's new clinical results.    Lab Results (last 24 hours)     Procedure Component Value Units Date/Time    Respiratory Culture - Aspirate, ET Suction [[id number]] Collected: 08/08/21 0438    Specimen: Aspirate from ET Suction Updated: 08/09/21 0917     Respiratory Culture Scant growth (1+) Normal Respiratory Ena: NO S.aureus/MRSA or Pseudomonas aeruginosa     Gram Stain Many (4+) WBCs per low power field      Rare (1+) Gram positive cocci in pairs      Rare (1+) Normal respiratory ena    Blood Gas, Arterial - [[id number]]  (Abnormal) Collected: 08/09/21 0800    Specimen: Arterial Blood Updated: 08/09/21 0816     Site Right Radial     Kris's Test Positive     pH, Arterial 7.243 pH units      Comment: 84 Value below " reference range        pCO2, Arterial 94.0 mm Hg      Comment: 86 Value above critical limit        pO2, Arterial 77.2 mm Hg      Comment: 84 Value below reference range        HCO3, Arterial 40.5 mmol/L      Comment: 83 Value above reference range        Base Excess, Arterial 9.2 mmol/L      Comment: 83 Value above reference range        O2 Saturation, Arterial 95.1 %      Hemoglobin, Blood Gas 13.9 g/dL      Temperature 37.0 C      Barometric Pressure for Blood Gas 740 mmHg      Modality Ventilator     FIO2 50 %      Ventilator Mode AC     Set Tidal Volume 400     Set Mech Resp Rate 16.0     PEEP 5.0     Notified Who RB AND VERIFIED     Notified By [id number]     Notified Time 08/09/2021 08:17     Collected by [id number]     Comment: Meter: [id number]     :  [id number]        pCO2, Temperature Corrected 94.0 mm Hg      pH, Temp Corrected 7.243 pH Units      pO2, Temperature Corrected 77.2 mm Hg     BNP [[id number]]  (Normal) Collected: 08/09/21 0419    Specimen: Blood Updated: 08/09/21 0554     proBNP 228.2 pg/mL     Narrative:      Among patients with dyspnea, NT-proBNP is highly sensitive for the detection of acute congestive heart failure. In addition NT-proBNP of <300 pg/ml effectively rules out acute congestive heart failure with 99% negative predictive value.    Results may be falsely decreased if patient taking Biotin.      Procalcitonin [[id number]]  (Normal) Collected: 08/09/21 0419    Specimen: Blood Updated: 08/09/21 0551     Procalcitonin 0.03 ng/mL     Narrative:      Results may be falsely decreased if patient taking Biotin.     Magnesium [[id number]]  (Normal) Collected: 08/09/21 0419    Specimen: Blood Updated: 08/09/21 0516     Magnesium 2.0 mg/dL     Comprehensive Metabolic Panel [[id number]]  (Abnormal) Collected: 08/09/21 0419    Specimen: Blood Updated: 08/09/21 0516     Glucose 150 mg/dL      BUN 13 mg/dL      Creatinine 0.52 mg/dL      Sodium 138 mmol/L      Potassium 4.7 mmol/L       Chloride 99 mmol/L      CO2 34.8 mmol/L      Calcium 8.4 mg/dL      Total Protein 5.5 g/dL      Albumin 2.60 g/dL      ALT (SGPT) 13 U/L      AST (SGOT) 20 U/L      Alkaline Phosphatase 68 U/L      Total Bilirubin 0.2 mg/dL      eGFR Non African Amer 122 mL/min/1.73      Globulin 2.9 gm/dL      A/G Ratio 0.9 g/dL      BUN/Creatinine Ratio 25.0     Anion Gap 4.2 mmol/L     Narrative:      GFR Normal >60  Chronic Kidney Disease <60  Kidney Failure <15      CBC & Differential [[id number]]  (Abnormal) Collected: 08/09/21 0419    Specimen: Blood Updated: 08/09/21 0513    Narrative:      The following orders were created for panel order CBC & Differential.  Procedure                               Abnormality         Status                     ---------                               -----------         ------                     CBC Auto Differential[[id number]]        Abnormal            Final result               Scan Slide[[id number]]                                       Final result                 Please view results for these tests on the individual orders.    Scan Slide [[id number]] Collected: 08/09/21 0419    Specimen: Blood Updated: 08/09/21 0513     Macrocytes Slight/1+     WBC Morphology Normal     Platelet Morphology Normal    CBC Auto Differential [[id number]]  (Abnormal) Collected: 08/09/21 0419    Specimen: Blood Updated: 08/09/21 0500     WBC 13.40 10*3/mm3      RBC 4.08 10*6/mm3      Hemoglobin 13.0 g/dL      Hematocrit 42.9 %      .1 fL      MCH 31.9 pg      MCHC 30.3 g/dL      RDW 12.9 %      RDW-SD 50.9 fl      MPV 9.8 fL      Platelets 203 10*3/mm3      Neutrophil % 86.3 %      Lymphocyte % 7.3 %      Monocyte % 5.7 %      Eosinophil % 0.0 %      Basophil % 0.1 %      Immature Grans % 0.6 %      Neutrophils, Absolute 11.57 10*3/mm3      Lymphocytes, Absolute 0.98 10*3/mm3      Monocytes, Absolute 0.76 10*3/mm3      Eosinophils, Absolute 0.00 10*3/mm3      Basophils, Absolute 0.01 10*3/mm3       Immature Grans, Absolute 0.08 10*3/mm3     Blood Culture - Blood, Arm, Left [[id number]] Collected: 08/07/21 2243    Specimen: Blood from Arm, Left Updated: 08/08/21 2315     Blood Culture No growth at 24 hours    Blood Culture - Blood, Arm, Left [[id number]] Collected: 08/07/21 2054    Specimen: Blood from Arm, Left Updated: 08/08/21 2115     Blood Culture No growth at 24 hours    Respiratory Panel, PCR (WITHOUT COVID) - Swab, Nasopharynx [[id number]] Collected: 08/08/21 1952    Specimen: Swab from Nasopharynx Updated: 08/08/21 1957    Respiratory Culture - Sputum, ET Suction [[id number]] Collected: 08/08/21 1337    Specimen: Sputum from ET Suction Updated: 08/08/21 1827     Gram Stain Moderate (3+) WBCs seen      No epithelial cells seen      Rare (1+) Normal respiratory ena    Legionella Antigen, Urine - Urine, Urine, Clean Catch [[id number]]  (Normal) Collected: 08/08/21 1316    Specimen: Urine, Clean Catch Updated: 08/08/21 1346     LEGIONELLA ANTIGEN, URINE Negative    S. Pneumo Ag Urine or CSF - Urine, Urine, Clean Catch [[id number]]  (Normal) Collected: 08/08/21 1316    Specimen: Urine, Clean Catch Updated: 08/08/21 1346     Strep Pneumo Ag Negative    Urine Drug Screen - Urine, Catheter [[id number]]  (Normal) Collected: 08/08/21 1315    Specimen: Urine, Catheter Updated: 08/08/21 1335     THC, Screen, Urine Negative     Phencyclidine (PCP), Urine Negative     Cocaine Screen, Urine Negative     Methamphetamine, Ur Negative     Opiate Screen Negative     Amphetamine Screen, Urine Negative     Benzodiazepine Screen, Urine Negative     Tricyclic Antidepressants Screen Negative     Methadone Screen, Urine Negative     Barbiturates Screen, Urine Negative     Oxycodone Screen, Urine Negative     Propoxyphene Screen Negative     Buprenorphine, Screen, Urine Negative    Narrative:      Urine drug screen results are to be used for medical purposes only.  They are not to be used for legal purposes such as employment  testing.  Negative results do not necessarily mean the complete absence of a subtance, but rather that the result is less than the cutoff for that substance.  Positive results are unconfirmed and considered Preliminary Positive.  [facility] does not automatically confirm Postitive Unconfirmed results.  The physician may request (order) an Unconfirmed Positive result to be sent out for confirmation.      Negative Thresholds for Drugs Screened:    THC screen, urine                          50 ng/ml  Phenycyclidine (PCP), urine                25 ng/ml  Cocaine screen, urine                     150 ng/ml  Methamphetamine, urine                    500 ng/ml  Opiate screen, urine                      100 ng/ml  Amphetamine screen, urine                 500 ng/ml  Benzodiazepine screen, urine              150 ng/ml  Tricyclic Antidepressants screen, urine   300 ng/ml  Methadone screen, urine                   200 ng/ml  Barbiturates screen, urine                200 ng/ml  Oxycodone screen, urine                   100 ng/ml  Propoxyphene screen, urine                300 ng/ml  Buprenorphine screen, urine                10 ng/ml          Imaging Results (Last 24 Hours)     Procedure Component Value Units Date/Time    XR Chest 1 View [[id number]] Collected: 08/09/21 0520     Updated: 08/09/21 0522    Narrative:      CR Chest 1 Vw    INDICATION:   Respiratory failure. Follow-up.     COMPARISON:    8/7/2021    FINDINGS:  Single portable AP view(s) of the chest.    Endotracheal tube is in the mid trachea. Heart size remains normal. Infiltrates at the right lung base are not significantly changed. Infiltrates at the left lung base have worsened. No pneumothorax is seen.       Impression:      Worsening left lower lung infiltrates with stable infiltrates at the right base.    Signer Name: Mirza Ruiz MD   Signed: 8/9/2021 5:20 AM   Workstation Name: [email or web address]    [facility]  reviewed personally by physician.        Medication Review:   I have reviewed the patient's current medication list    Current Facility-Administered Medications:   •  cefepime (MAXIPIME) IVPB 2 g/50ml D5W (premix), 2 g, Intravenous, Q8H, Tra Diaz MD, 2 g at 08/09/21 0831  •  chlorhexidine (PERIDEX) 0.12 % solution 15 mL, 15 mL, Mouth/Throat, Q12H, Jhon Mcgill DO, 15 mL at 08/09/21 0831  •  enoxaparin (LOVENOX) syringe 40 mg, 40 mg, Subcutaneous, Nightly, Jhon Mcgill DO, 40 mg at 08/08/21 2034  •  fentaNYL citrate (PF) (SUBLIMAZE) injection 100 mcg, 100 mcg, Intravenous, Q2H PRN, Guillermo Kim MD, 100 mcg at 08/08/21 1925  •  metroNIDAZOLE (FLAGYL) 500 mg/100mL IVPB, 500 mg, Intravenous, Q8H, Tra Diaz MD, 500 mg at 08/09/21 0831  •  nitroglycerin (NITROSTAT) SL tablet 0.4 mg, 0.4 mg, Sublingual, Q5 Min PRN, Jhon Mcgill DO  •  pantoprazole (PROTONIX) injection 40 mg, 40 mg, Intravenous, Q24H, Jhon Mcgill DO, 40 mg at 08/09/21 0830  •  Pharmacy to dose vancomycin, , Does not apply, Continuous PRN, Tiffanie Gomez MD  •  propofol (DIPRIVAN) infusion 10 mg/mL 100 mL, 5-50 mcg/kg/min (Order-Specific), Intravenous, Titrated, Jhon Mcgill DO, Last Rate: 19.2 mL/hr at 08/09/21 0552, 40 mcg/kg/min at 08/09/21 0552  •  sodium chloride 0.9 % flush 10 mL, 10 mL, Intravenous, PRN, Jhon Mcgill DO  •  sodium chloride 0.9 % flush 10 mL, 10 mL, Intravenous, Q12H, Jhon Mcgill DO, 10 mL at 08/08/21 2034  •  sodium chloride 0.9 % infusion 40 mL, 40 mL, Intravenous, PRN, Jhon Mcgill, DO  •  vancomycin (VANCOCIN) 1,250 mg in sodium chloride 0.9 % 250 mL IVPB, 20 mg/kg, Intravenous, Once, Tiffanie Gomez MD, Currently Infusing at 08/08/21 0154  •  vancomycin 1 g/250 mL 0.9% NS (vial-mate), 1,000 mg, Intravenous, Q12H, Tiffanie Gomez MD, 1,000 mg at 08/09/21 0238      Assessment/Plan     1.  Acute H/H Respiratory  Failure: CXR worse.  Antibiotic strategy changed yesterday.  Micro, as of yet, unreveling.  Procalcitonin is negative which could indeed support aspiration.  Will D/C Vanc as MRSA was negative.  Acidosis worse on ABG.  Vent changes per pulmonary.     2.  ?Hx/O CVA: See note from yesterday.     3.  Hypertension: At goal on exam.  Continue to monitor trend.    Plan for disposition: Predicated on hospital course.    Tra Diaz MD  08/09/21  09:25 EDT

## 2021-08-10 NOTE — ACP (ADVANCE CARE PLANNING)
Education with patient, and daughter, Fadia at bedside, concerning Advance Directive/living will.  Patient stated she understood and completed the document.  Patient named daughter Fadia Brito as her healthcare surrogate and daughter, Corrine Salinas, as back up decision maker to Alisha.  Fadia stated that the Ms. Marie's other 2 children would be included in all healthcare decisions regarding their mother.  Patient stated she did not want to be intubated at any point but would continue to use Bi-pap.  Patient also stated she did not want feeding tube.      Kayla Monzon

## 2021-08-10 NOTE — PLAN OF CARE
Goal Outcome Evaluation:  Plan of Care Reviewed With: patient        Progress: improving       The patient was successful weaned form the vent this am, pt does not tolerate the Bipap well and family states that she will not wear it at home; precedex was started to help with the anxiety of bipap but was stopped when pt could not wear the bipap, new order for IV ativan was started; pt had a bedside swallow eval with ST and did not pass, will be seen again tomorrow; pt was able to make some decisions to change her code status to DNR and create a living will with the , see new orders. Will continue to monitor the pt.

## 2021-08-10 NOTE — PROGRESS NOTES
Discussed code status with patient with daughter, Fadia, at bedside.  Patient stated that she wants to be a DNR.      Spoke with Alisha Josue RN and Dr. Diaz regarding her decision.    Kayla Monzon

## 2021-08-10 NOTE — PROGRESS NOTES
[facility] INPATIENT PROGRESS NOTE         [facility] ICU    8/10/2021      PATIENT IDENTIFICATION:  Name: Flores Marie ADMIT: 2021   : 1965  PCP: Sayda Burnett MD    MRN: [id number] LOS: 3 days   AGE/SEX: 56 y.o. female  ROOM: ICU6/                     LOS 3    Reason for visit: Confusion, encephalopathy       SUBJECTIVE:    Remains intubated, but following commands, no complaints, not breathing over the vent   Review of Systems   Constitutional: Negative for chills and fever.   HENT: Positive for sore throat.         Yesterday had some oral secretions, which have improved   Eyes: Negative for pain and visual disturbance.   Respiratory: Positive for cough and shortness of breath. Negative for wheezing and stridor.    Cardiovascular: Negative for chest pain and leg swelling.   Gastrointestinal: Negative for diarrhea, nausea and vomiting.   Musculoskeletal: Negative for back pain and myalgias.   Skin: Negative for rash.   Psychiatric/Behavioral: Negative for agitation and behavioral problems.         Objective   OBJECTIVE:    Vital Sign Min/Max for last 24 hours  Temp  Min: 97.3 °F (36.3 °C)  Max: 98.6 °F (37 °C)   BP  Min: 88/63  Max: 133/83   Pulse  Min: 95  Max: 118   Resp  Min: 20  Max: 22   SpO2  Min: 94 %  Max: 99 %   No data recorded   No data recorded       FiO2 (%):  [45 %-50 %] 45 %  S RR:  [16-26] 26  PEEP/CPAP (cm H2O):  [5 cm H20] 5 cm H20  NM SUP:  [0 cm H20] 0 cm H20  MAP (cm H2O):  [9.4-12] 12           FiO2 (%): 45 %     Body mass index is 23.8 kg/m².    Intake/Output Summary (Last 24 hours) at 8/10/2021 0720  Last data filed at 8/10/2021 0621  Gross per 24 hour   Intake 2526.28 ml   Output 800 ml   Net 1726.28 ml         Exam:  GEN:  Intubated, No distress, appears stated age  EYES:   PERRL, anicteric sclerae  ENT:    External ears/nose normal, OP clear, ETT in place  NECK:  No adenopathy, midline trachea  LUNGS: Mechanical sounds, but clear  CV:  Normal S1S2,  without murmur  ABD:  Nontender, nodistended, no hepatosplenomegaly, +BS  EXT:  No edema.  No cyanosis or clubbing.   Neuro: moving all 4 extremities, following commands, some weakness on right side         Scheduled meds:  cefepime, 2 g, Intravenous, Q8H  chlorhexidine, 15 mL, Mouth/Throat, Q12H  enoxaparin, 40 mg, Subcutaneous, Nightly  metroNIDAZOLE, 500 mg, Intravenous, Q8H  pantoprazole, 40 mg, Intravenous, Q24H  sodium chloride, 10 mL, Intravenous, Q12H      IV meds:                      propofol, 5-50 mcg/kg/min (Order-Specific), Last Rate: 35 mcg/kg/min (08/10/21 0638)      Data Review:  Results from last 7 days   Lab Units 08/10/21  0424 08/09/21  0419 08/09/21  0419 08/08/21  0427 08/08/21  0427 08/07/21  2052 08/07/21  2052   SODIUM mmol/L 135*  --  138  --  135*  --  136   POTASSIUM mmol/L 4.3  --  4.7  --  5.1  --  4.3   CHLORIDE mmol/L 94*  --  99  --  98  --  100   CO2 mmol/L 40.3*  --  34.8*  --  33.0*  --  31.8*   BUN mg/dL 10  --  13  --  20  --  23*   CREATININE mg/dL 0.40*  --  0.52*  --  0.62  --  0.71   GLUCOSE mg/dL 133*   < > 150*   < > 99   < > 100*   CALCIUM mg/dL 8.2*  --  8.4*  --  8.6  --  7.7*    < > = values in this interval not displayed.         Estimated Creatinine Clearance: 162.8 mL/min (A) (by C-G formula based on SCr of 0.4 mg/dL (L)).  Results from last 7 days   Lab Units 08/09/21 0419 08/08/21 0427 08/07/21 2053   WBC 10*3/mm3 13.40* 12.16* 11.81*   HEMOGLOBIN g/dL 13.0 14.6 13.7   PLATELETS 10*3/mm3 203 222 195         Results from last 7 days   Lab Units 08/09/21  0419   ALT (SGPT) U/L 13   AST (SGOT) U/L 20     Results from last 7 days   Lab Units 08/10/21  0450 08/09/21  0800 08/08/21  0545 08/07/21  2110   PH, ARTERIAL pH units 7.376 7.243* 7.287* 7.366   PO2 ART mm Hg 71.6* 77.2* 71.9* 78.4*   PCO2, ARTERIAL mm Hg 75.2* 94.0* 74.4* 59.2*   HCO3 ART mmol/L 44.0* 40.5* 35.5* 33.9*     Results from last 7 days   Lab Units 08/10/21  0424 08/09/21  0419   PROCALCITONIN  ng/mL 0.02 0.03         Hemoglobin A1C   Date/Time Value Ref Range Status   08/07/2021 2052 5.70 (H) 4.80 - 5.60 % Final               Active Hospital Problems    Diagnosis  POA   • Respiratory failure with hypoxia (CMS/HCC) [J96.91]  Yes      Resolved Hospital Problems   No resolved problems to display.         ASSESSMENT:  Acute encephalopathy  Acute respiratory failure  Likely Chronic Hypercapnia   Pneumonia, RVP neg, MRSA screen neg, PCT neg  History of illicit drug abuse  Leukocytosis  COPD  Smoker     Recommendations: Patient is critically ill.  - We will continue to adjust mechanical ventilation, ABG this morning, 7.4 with pCO2 of 75.2, chronic retention so there is no acute issue with how high her pCO2 is.   - Would hold all sedation, trial her, check a VBG in a hour, and if she has a reasonable pH would extubate to Bipap.   - once extubated would start duonebs for her q6h as she does have COPD, would also start some symbicort 160 BID   - hold tube feeds this morning in possible anticipation of extubation  - continue PPI ppx, and oral cares    - Continue cefepime/metronidazole for LLL infitrate, sputum pending     Total critical care time 36 minutes excluding any separately billable procedure time     Patient was placed in face mask upon entering room and kept mask on throughout our encounter. I wore full protective equipment throughout this patient encounter including a face mask, gown and gloves. Hand hygiene was performed before donning protective equipment and after removal when leaving the room.    Efe Rubi MD  Pulmonary and Critical Care Medicine  [facility], [facility]  8/10/2021  07:20 EDT

## 2021-08-10 NOTE — PLAN OF CARE
Goal Outcome Evaluation:  Plan of Care Reviewed With: patient, other (see comments) (RN, Alisha; MD, Dr. Diaz)           Outcome Summary: SLP: Clinical swallow eval attempted but not fully completed due to pt refusal to take more than ice chips. Oral motor exam indicates lingual weakness but no asymmetry. Pt with difficulty following commands. Hoarse vocal quality throughout and weak, nonproductive cough. Pt with consistent coughing on trials of ice chips and one single trial of water by spoon. Recommend to continue w/NPO status and to allow single ice chips w/supervision. SLP to follow up with reassessment in am.

## 2021-08-10 NOTE — PROGRESS NOTES
"Hospitalist Team      Patient Care Team:  Sayda Burnett MD as PCP - General      Chief Complaint: Follow-up Acute Hypoxic/Hypercapnic Resp Failure     Subjective    No acute events overnight.  Wean trial in progress.    Objective    Vital Signs  Temp:  [95 °F (35 °C)-98.5 °F (36.9 °C)] 95 °F (35 °C)  Heart Rate:  [] 96  Resp:  [22-24] 24  BP: ()/(63-86) 106/71  FiO2 (%):  [29 %-50 %] 29 %  Oxygen Therapy  SpO2: 96 %  Pulse Oximetry Type: Continuous  Device (Oxygen Therapy): ventilator  Oxygen Concentration (%): 40  ETCO2 (mmHg): 59 mmHg}    Flowsheet Rows      First Filed Value   Admission Height  165.1 cm (65\") Documented at 08/07/2021 2000   Admission Weight  65.1 kg (143 lb 9.6 oz) Documented at 08/07/2021 2000        Physical Exam:    General: Appears older than stated age in NAD.  Lungs: Breath sounds are clear throughout all fields.  Respirations are non-labored.  CV: Regular rate and rhythm.  No murmur appreciated.  Radial and pedal pulses are 2+ and symmetric.  Abdomen: Soft and non-tender w/ active bowel sounds.  MSK: No C/C/E.  No asymmetry.  Neuro: Right sided tone okay.  Psych: [id number]    Results Review:     I reviewed the patient's new clinical results.    Lab Results (last 24 hours)     Procedure Component Value Units Date/Time    Blood Gas, Venous - [[id number]]  (Abnormal) Collected: 08/10/21 0845    Specimen: Venous Blood Updated: 08/10/21 0851     Site OTHER     pH, Venous 7.377 pH Units      pCO2, Venous 73.8 mm Hg      Comment: 83 Value above reference range        pO2, Venous 69.8 mm Hg      Comment: 83 Value above reference range        HCO3, Venous 43.4 mmol/L      Comment: 83 Value above reference range        Base Excess, Venous 14.5 mmol/L      Comment: 83 Value above reference range        O2 Saturation, Venous 95.1 %      Comment: 83 Value above reference range        Hemoglobin, Blood Gas 13.8 g/dL      Temperature 37.0 C      Barometric Pressure for Blood Gas 741 " mmHg      Modality Ventilator     FIO2 40 %      Ventilator Mode PSV     PSV 10.0 cmH2O      Collected by [id number]     Comment: Meter: [id number]     :  [id number]       Procalcitonin [[id number]]  (Normal) Collected: 08/10/21 0424    Specimen: Blood Updated: 08/10/21 0530     Procalcitonin 0.02 ng/mL     Narrative:      Results may be falsely decreased if patient taking Biotin.     Renal Function Panel [[id number]]  (Abnormal) Collected: 08/10/21 0424    Specimen: Blood Updated: 08/10/21 0509     Glucose 133 mg/dL      BUN 10 mg/dL      Creatinine 0.40 mg/dL      Sodium 135 mmol/L      Potassium 4.3 mmol/L      Chloride 94 mmol/L      CO2 40.3 mmol/L      Calcium 8.2 mg/dL      Albumin 2.40 g/dL      Phosphorus 2.0 mg/dL      Anion Gap 0.7 mmol/L      BUN/Creatinine Ratio 25.0     eGFR Non African Amer >150 mL/min/1.73     Narrative:      GFR Normal >60  Chronic Kidney Disease <60  Kidney Failure <15      Blood Gas, Arterial - [[id number]]  (Abnormal) Collected: 08/10/21 0450    Specimen: Arterial Blood Updated: 08/10/21 0501     Site Right Radial     Kris's Test Positive     pH, Arterial 7.376 pH units      pCO2, Arterial 75.2 mm Hg      Comment: 86 Value above critical limit        pO2, Arterial 71.6 mm Hg      Comment: 84 Value below reference range        HCO3, Arterial 44.0 mmol/L      Comment: 83 Value above reference range        Base Excess, Arterial 15.3 mmol/L      Comment: 83 Value above reference range        O2 Saturation, Arterial 95.1 %      Hemoglobin, Blood Gas 12.7 g/dL      Temperature 37.0 C      Barometric Pressure for Blood Gas 740 mmHg      Modality Ventilator     FIO2 45 %      Ventilator Mode AC     Set Tidal Volume 400     Set Mech Resp Rate 22.0     PEEP 5.0     Notified Who DR CISNEROS READ BACK AND VERIFIED     Notified By [id number]     Notified Time 08/10/2021 05:03     Collected by [id number]     Comment: Meter: [id number]     :  [id number]        pCO2, Temperature  Corrected 75.2 mm Hg      pH, Temp Corrected 7.376 pH Units      pO2, Temperature Corrected 71.6 mm Hg     Blood Culture - Blood, Arm, Left [[id number]] Collected: 08/07/21 2243    Specimen: Blood from Arm, Left Updated: 08/09/21 2315     Blood Culture No growth at 2 days    Blood Culture - Blood, Arm, Left [[id number]] Collected: 08/07/21 2054    Specimen: Blood from Arm, Left Updated: 08/09/21 2116     Blood Culture No growth at 2 days    Vancomycin, Random [[id number]]  (Normal) Collected: 08/09/21 1330    Specimen: Blood Updated: 08/09/21 1402     Vancomycin Random 9.90 mcg/mL     Narrative:      Therapeutic Ranges for Vancomycin    Vancomycin Random   5.0-40.0 mcg/mL  Vancomycin Trough   5.0-20.0 mcg/mL  Vancomycin Peak     20.0-40.0 mcg/mL          Imaging Results (Last 24 Hours)     ** No results found for the last 24 hours. **          Medication Review:   I have reviewed the patient's current medication list    Current Facility-Administered Medications:   •  cefepime (MAXIPIME) IVPB 2 g/50ml D5W (premix), 2 g, Intravenous, Q8H, Tra Diaz MD, 2 g at 08/10/21 0936  •  chlorhexidine (PERIDEX) 0.12 % solution 15 mL, 15 mL, Mouth/Throat, Q12H, Jhon Mcgill DO, 15 mL at 08/09/21 2025  •  enoxaparin (LOVENOX) syringe 40 mg, 40 mg, Subcutaneous, Nightly, Jhon Mcgill DO, 40 mg at 08/09/21 2026  •  fentaNYL citrate (PF) (SUBLIMAZE) injection 100 mcg, 100 mcg, Intravenous, Q2H PRN, Guillermo Kim MD, 100 mcg at 08/10/21 0609  •  metroNIDAZOLE (FLAGYL) 500 mg/100mL IVPB, 500 mg, Intravenous, Q8H, Tra Diaz MD, 500 mg at 08/10/21 0939  •  nitroglycerin (NITROSTAT) SL tablet 0.4 mg, 0.4 mg, Sublingual, Q5 Min PRN, Jhon Mcgill DO  •  pantoprazole (PROTONIX) injection 40 mg, 40 mg, Intravenous, Q24H, Jhon Mcgill, , 40 mg at 08/10/21 0939  •  propofol (DIPRIVAN) infusion 10 mg/mL 100 mL, 5-50 mcg/kg/min (Order-Specific), Intravenous, Titrated, Jhon Mcgill DO, Last  Rate: 16.8 mL/hr at 08/10/21 0638, 35 mcg/kg/min at 08/10/21 0638  •  sodium chloride 0.9 % flush 10 mL, 10 mL, Intravenous, PRN, Jhno Mcgill, DO  •  sodium chloride 0.9 % flush 10 mL, 10 mL, Intravenous, Q12H, Jhon Mcgill, DO, 10 mL at 08/10/21 0945  •  sodium chloride 0.9 % infusion 40 mL, 40 mL, Intravenous, PRN, Jhon Mcgill,       Assessment/Plan     1.  Acute Hypercapnic/Hypoxiemic Respiratory Failure: Anticipate extubation today.  Will start on BiPAP.  Pulmonary following.  Will watch in unit today.    2.  Suspected Aspiration PNA: Continues on Cefepime and Flagyl.  Culture data negative thus far.    3.  Hypertension: At goal on exam.  Continue to hold oral therapy.  Watch for rebound tachycardia given beta-blocker withdrawal.    4.  Right-sided Weakness: I've reviewed the June 2021 admission again and work-up for stroke was negative.  They felt she had Chronic Pain or Complex Migraine.  Will ask Dr. Soni to see if extubated and after sedation completely burned off.     Plan for disposition: Predicated on hospital course.    Tra Diaz MD  08/10/21  09:47 EDT

## 2021-08-10 NOTE — PROGRESS NOTES
Patient pulled off bipap mask and refused to put back on at this time placed on 2lpm NC and will try to put back on later.

## 2021-08-10 NOTE — PLAN OF CARE
Goal Outcome Evaluation:           Progress: no change  Outcome Summary: remains sedated on vent-FIO2 45%. restless with minimal stimulation. Tube feeding tolerated/continues at goal rate. SR/ST on the monitor. vss

## 2021-08-10 NOTE — THERAPY EVALUATION
Acute Care - Speech Language Pathology   Swallow Initial Evaluation  [place]     Patient Name: Flores Marie  : 1965  MRN: [id number]  Today's Date: 8/10/2021               Admit Date: 2021    Visit Dx:   No diagnosis found.  Patient Active Problem List   Diagnosis   • Respiratory failure with hypoxia (CMS/[facility])     Past Medical History:   Diagnosis Date   • Arthritis    • Asthma    • COPD (chronic obstructive pulmonary disease) (CMS/[facility])    • Elevated cholesterol    • Hypertension    • Stroke (CMS/[facility])      History reviewed. No pertinent surgical history.    SLP Recommendation and Plan  SLP Swallowing Diagnosis: mild-moderate, oral dysphagia, suspected pharyngeal dysphagia  SLP Diet Recommendation: NPO, other (see comments) (ice chips w/supervision, one at a time)  Recommended Precautions and Strategies: general aspiration precautions, reflux precautions, fatigue precautions, 1:1 supervision  SLP Rec. for Method of Medication Administration: meds via alternate route     Monitor for Signs of Aspiration: yes, cough, throat clearing  Recommended Diagnostics: reassess via clinical swallow evaluation  Swallow Criteria for Skilled Therapeutic Interventions Met: demonstrates skilled criteria  Anticipated Discharge Disposition (SLP): unknown  Rehab Potential/Prognosis, Swallowing: re-evaluate goals as necessary  Therapy Frequency (Swallow): PRN  Predicted Duration Therapy Intervention (Days): 1 week, until discharge               Patient/Family Concerns, Anticipated Discharge Disposition (SLP): No concerns reported per family present in the room (Fadia).          Plan of Care Reviewed With: patient, other (see comments) (RN, Alisha; MD, Dr. Diaz)  Outcome Summary: SLP: Clinical swallow eval attempted but not fully completed due to pt refusal to take more than ice chips. Oral motor exam indicates lingual weakness but no asymmetry. Pt with difficulty following commands. Hoarse vocal quality throughout and  weak, nonproductive cough. Pt with consistent coughing on trials of ice chips and one single trial of water by spoon. Recommend to continue w/NPO status and to allow single ice chips w/supervision. SLP to follow up with reassessment in am.         SWALLOW EVALUATION (last 72 hours)      SLP Adult Swallow Evaluation     Row Name 08/10/21 1693                   Rehab Evaluation    Document Type  evaluation  -AD        Subjective Information  no complaints  -AD        Patient Observations  decreased LOC semi cooperative with confusion/difficulty following command  -AD        Patient/Family/Caregiver Comments/Observations  Pt seen upright in bed, very fidgety and difficulty maintaining attention to task at times. Difficulty following commands at times requiring frequent repetitions. Pt pulling off BP cuff and refusing to take further trials of po to complete exam.   -AD        Care Plan Review  evaluation/treatment results reviewed;care plan/treatment goals reviewed;risks/benefits reviewed;current/potential barriers reviewed  -AD        Care Plan Review, Other Participant(s)  family;other (see comments) RN, Alisha; MD, Dr. Diaz  -AD        Patient Effort  poor  -AD        Symptoms Noted During/After Treatment  other (see comments) fatigue/confusion  -AD           General Information    Patient Profile Reviewed  yes  -AD        Pertinent History Of Current Problem  Pt is a 55 y/o female diagnosed with acute encephalopathy, acute respiratory failure likely chronic who required intubation at [facility] in the ER and was transferred here for further care. Pt intubated on 8/7/21 and extubated today (8/10/21). Currently with pneumonia and leukocytosis. Hx of illecit drug use, possible CVA but not conclusive in May of this year w/right sided weakness and parasthesias. Hx of alcohol abuse also reported. Last use was reportedly prior to her admission for CVA. Pt was receiving NG tube feedings but removed her tube today after being  extubated.   -AD        Current Method of Nutrition  NPO  -AD        Precautions/Limitations, Vision  other (see comments) unable to assess; no gross deficits noted; does track/focus  -AD        Precautions/Limitations, Hearing  other (see comments) difficult to assess  -AD        Prior Level of Function-Communication  cognitive-linguistic impairment;other (see comments) suspected  -AD        Prior Level of Function-Swallowing  no diet consistency restrictions;other (see comments) reported; RN states family reports swallowing difficulties  -AD        Plans/Goals Discussed with  family;agreed upon;other (see comments) RN  -AD        Barriers to Rehab  cognitive status  -AD        Patient's Goals for Discharge  patient could not state  -AD        Family Goals for Discharge  patient able to return to PO diet;patient able to eat/drink without coughing/choking  -AD           Pain    Additional Documentation  -- no pain reported  -AD           Oral Motor Structure and Function    Oral Lesions or Structural Abnormalities and/or variants  reddened posterior pharyngeal wall  -AD        Dentition Assessment  missing teeth;teeth are in poor condition several missing-upper left/lower right molars ; decay/broken  -AD        Secretion Management  other (see comments) dry oral mucosa  -AD        Mucosal Quality  dry  -AD        Volitional Swallow  delayed  -AD        Volitional Cough  non-productive;weak;reduced respiratory support  -AD           Oral Musculature and Cranial Nerve Assessment    Oral Motor General Assessment  generalized oral motor weakness;vocal impairment  -AD        Lingual Impairment, Detail. Cranial Nerves IX, XII (Glossopharyngeal and Hypoglossal)  reduced lingual ROM;reduced strength;bilaterally  -AD        Vocal Impairment, Detail. Cranial Nerve X (Vagus)  vocal quality abnormality (see comments);impaired throat clear/cough (see comments)  -AD        Oral Motor, Comment  Pt with generalized weakness and  decreased range of the tongue. Slow responses w/hoarse vocal quality. Weak, nonproductive cough with reduced respiratory support noted.   -AD           General Eating/Swallowing Observations    Respiratory Support Currently in Use  nasal cannula  -AD        Eating/Swallowing Skills  fed by SLP  -AD        Positioning During Eating  upright 90 degree;upright in chair  -AD        Utensils Used  spoon  -AD        Consistencies Trialed  ice chips;thin liquids  -AD        Pre SpO2 (%)  94  -AD        Post SpO2 (%)  90  -AD           Respiratory    Respiratory Status  reduction in SpO2;during swallowing/eating  -AD        Respiratory Pattern  decrease control/incoordination of breathing swallow  -AD        Date of Intubation  8/7/21 extubated 8/10/21  -AD           Clinical Swallow Eval    Oral Prep Phase  impaired  -AD        Oral Transit  impaired  -AD        Oral Residue  WFL  -AD        Pharyngeal Phase  suspected pharyngeal impairment  -AD        Esophageal Phase  unremarkable  -AD        Clinical Swallow Evaluation Summary  Limited evaluation with ice chips and one trial of water by spoon. Pt with decreased labial seal and anterior loss of ice chips. Pt with consistent throat clearing after trials of ice and thins by spoon. Pt refusing further trials of thins or further trials of po at this time. RN reports frequent throat clearing throughout the day since extubation.   -AD           Oral Prep Concerns    Oral Prep Concerns  incomplete or weak lip closure around spoon;anterior loss;spits out food prior to swallow  -AD        Incomplete or Weak Lip Closure Around Spoon  thin  -AD        Anterior Loss  thin  -AD        Spits Out Food Prior to Swallow  thin  -AD           Oral Transit Concerns    Oral Transit Concerns  increased oral transit time  -AD        Increased Oral Transit Time  thin  -AD           Pharyngeal Phase Concerns    Pharyngeal Phase Concerns  throat clear  -AD        Throat Clear  thin  -AD            Clinical Impression    SLP Swallowing Diagnosis  mild-moderate;oral dysphagia;suspected pharyngeal dysphagia  -AD        Functional Impact  risk of aspiration/pneumonia;risk of malnutrition;risk of dehydration  -AD        Rehab Potential/Prognosis, Swallowing  re-evaluate goals as necessary  -AD        Swallow Criteria for Skilled Therapeutic Interventions Met  demonstrates skilled criteria  -AD           Recommendations    Therapy Frequency (Swallow)  PRN  -AD        Predicted Duration Therapy Intervention (Days)  1 week;until discharge  -AD        SLP Diet Recommendation  NPO;other (see comments) ice chips w/supervision, one at a time  -AD        Recommended Diagnostics  reassess via clinical swallow evaluation  -AD        Recommended Precautions and Strategies  general aspiration precautions;reflux precautions;fatigue precautions;1:1 supervision  -AD        Oral Care Recommendations  Oral Care BID/PRN;Toothbrush  -AD        SLP Rec. for Method of Medication Administration  meds via alternate route  -AD        Monitor for Signs of Aspiration  yes;cough;throat clearing  -AD        Anticipated Discharge Disposition (SLP)  unknown  -AD        Patient/Family Concerns, Anticipated Discharge Disposition (SLP)  No concerns reported per family present in the room (Fadia).   -AD           Swallow Goals (SLP)    Oral Nutrition/Hydration Goal Selection (SLP)  oral nutrition/hydration, SLP goal 1  -AD           Oral Nutrition/Hydration Goal 1 (SLP)    Oral Nutrition/Hydration Goal 1, SLP  Pt will participate in a reassessment of her swallowing at bedside to assess for safety of po intake.   -AD        Time Frame (Oral Nutrition/Hydration Goal 1, SLP)  short term goal (STG);2 days  -AD        Barriers (Oral Nutrition/Hydration Goal 1, SLP)  medically complex; decreased cooperation  -AD        Progress/Outcomes (Oral Nutrition/Hydration Goal 1, SLP)  goal ongoing  -AD          User Key  (r) = Recorded By, (t) = Taken By, (c) =  Cosigned By    Initials Name Effective Dates    Gemma Ramos MS CCC-SLP 06/16/21 -           EDUCATION  The patient has been educated in the following areas:   Dysphagia (Swallowing Impairment) NPO rationale. Pt does not demonstrate understanding at this time. Family and staff verbalize understanding.       SLP GOALS     Row Name 08/10/21 1530             Oral Nutrition/Hydration Goal 1 (SLP)    Oral Nutrition/Hydration Goal 1, SLP  Pt will participate in a reassessment of her swallowing at bedside to assess for safety of po intake.   -AD      Time Frame (Oral Nutrition/Hydration Goal 1, SLP)  short term goal (STG);2 days  -AD      Barriers (Oral Nutrition/Hydration Goal 1, SLP)  medically complex; decreased cooperation  -AD      Progress/Outcomes (Oral Nutrition/Hydration Goal 1, SLP)  goal ongoing  -AD        User Key  (r) = Recorded By, (t) = Taken By, (c) = Cosigned By    Initials Name Provider Type    Gemma Ramos MS CCC-SLP Speech and Language Pathologist             Time Calculation:   Time Calculation- SLP     Row Name 08/10/21 1936             Time Calculation- SLP    SLP Start Time  1500  -AD      SLP Stop Time  1530  -AD      SLP Time Calculation (min)  30 min  -AD      Total Timed Code Minutes- SLP  0 minute(s)  -AD      SLP Non-Billable Time (min)  0 min  -AD      SLP Received On  08/10/21  -AD      SLP - Next Appointment  08/11/21  -AD         Untimed Charges    SLP Eval/Re-eval   ST Eval Oral Pharyng Swallow - 95111  -AD      [id number] Eval Oral Pharyng Swallow Minutes  30  -AD         Total Minutes    Untimed Charges Total Minutes  30  -AD       Total Minutes  30  -AD        User Key  (r) = Recorded By, (t) = Taken By, (c) = Cosigned By    Initials Name Provider Type    Gemma Ramos, MS CCC-SLP Speech and Language Pathologist          Therapy Charges for Today     Code Description Service Date Service Provider Modifiers Qty    [id number] HC ST EVAL ORAL PHARYNG SWALLOW 2  8/10/2021 Gemma Sierra, MS CCC-SLP GN 1               Gemma Sierra, MS CCC-SLP  8/10/2021

## 2021-08-11 PROBLEM — J96.22 ACUTE ON CHRONIC RESPIRATORY FAILURE WITH HYPOXIA AND HYPERCAPNIA (HCC): Chronic | Status: ACTIVE | Noted: 2021-01-01

## 2021-08-11 PROBLEM — J96.21 ACUTE ON CHRONIC RESPIRATORY FAILURE WITH HYPOXIA AND HYPERCAPNIA (HCC): Chronic | Status: ACTIVE | Noted: 2021-01-01

## 2021-08-11 NOTE — CONSULTS
"  Patient Identification:  NAME:  Flores Marie  Age:  56 y.o.   Sex:  female   :  1965   MRN:  [id number]       Chief complaint: sHe does not have one, reason for consult right-sided weakness    History of present illness: Patient is a 56-year-old right-handed white female who states she had a stroke in May hypertension COPD comes in with pneumonia is context duration several days modifying factors antibiotics associated symptoms she is weak on her right side and I am called to assess this by report she had a work-up at [facility] that did not show a stroke as part of the work-up.  Nonetheless she states she had a stroke and it affected her right side.  I am being called to assess this she does also complain of neck pain but no bowel bladder incontinence apparently she states she feels \"numb all over\" but definitely has some weakness on the right side with clumsy right arm and inability to lift the right leg.  Note on 2021 note states that the MRI at that time was unremarkable and it was felt it could be secondary to chronic pain.    Past medical history:  Past Medical History:   Diagnosis Date   • Arthritis    • Asthma    • COPD (chronic obstructive pulmonary disease) (CMS/[facility])    • Elevated cholesterol    • Hypertension    • Stroke (CMS/[facility])        Past surgical history:  History reviewed. No pertinent surgical history.    Allergies:  Contrast dye, Gabapentin, Naproxen, Nsaids, Atorvastatin, Cyclobenzaprine, and Iodinated diagnostic agents    Home medications:  Medications Prior to Admission   Medication Sig Dispense Refill Last Dose   • atorvastatin (LIPITOR) 40 MG tablet Take 40 mg by mouth Daily.   2021 at Unknown time   • baclofen (LIORESAL) 10 MG tablet Take 10 mg by mouth 3 (Three) Times a Day.   2021 at Unknown time   • fluticasone (FLONASE) 50 MCG/ACT nasal spray 2 sprays into the nostril(s) as directed by provider Daily.   2021 at Unknown time   • lisinopril " (PRINIVIL,ZESTRIL) 20 MG tablet Take 20 mg by mouth Daily.   8/6/2021 at Unknown time   • metoprolol tartrate (LOPRESSOR) 50 MG tablet Take 50 mg by mouth 2 (Two) Times a Day.   8/6/2021 at Unknown time   • pantoprazole (PROTONIX) 40 MG EC tablet Take 40 mg by mouth Daily.   8/6/2021 at Unknown time   • QUEtiapine (SEROquel) 100 MG tablet Take 100 mg by mouth Every Night.   8/6/2021 at Unknown time        Hospital medications:  budesonide, 0.5 mg, Nebulization, BID - RT  cefepime, 2 g, Intravenous, Q8H  dilTIAZem CD, 240 mg, Oral, Q24H  enoxaparin, 40 mg, Subcutaneous, Nightly  ipratropium-albuterol, 3 mL, Nebulization, Q6H - RT  lisinopril, 20 mg, Oral, Q24H  metroNIDAZOLE, 500 mg, Intravenous, Q8H  QUEtiapine, 100 mg, Oral, Nightly  sertraline, 50 mg, Oral, Daily  sodium chloride, 10 mL, Intravenous, Q12H      dexmedetomidine, 0.2-1.5 mcg/kg/hr, Last Rate: Stopped (08/10/21 1830)      clonazePAM  •  fentaNYL citrate (PF)  •  LORazepam  •  nitroglycerin  •  sodium chloride  •  sodium chloride    Family history:  History reviewed. No pertinent family history.    Social history:  Social History     Tobacco Use   • Smoking status: Current Every Day Smoker     Packs/day: 0.50     Years: 10.00     Pack years: 5.00     Types: Cigarettes   • Tobacco comment: intubated/sedated   Substance Use Topics   • Alcohol use: Not Currently   • Drug use: Not Currently       Review of systems:    She states her neck hurts but she cannot really give me any other review of systems no family is present I reviewed the chart fully    Objective:  Vitals Ranges:   Temp:  [97.6 °F (36.4 °C)-98.3 °F (36.8 °C)] 98.3 °F (36.8 °C)  Heart Rate:  [] 102  Resp:  [20-32] 28  BP: (114-183)/() 180/113      Physical Exam:  Patient is awake but lethargic she looks drunk with her head bobbing, fund of knowledge poor attention span concentration poor recent remote memory cannot be tested language some subtle dysarthria but I do not believe she  is aphasic but she does say some think she is oriented x1.  Fund of knowledge is poor recent and remote memory as noted really could not be tested.  Pupils 3 constricting to 2 unable to visualize disc retinas eyes are conjugate nasolabial folds are symmetrical tongue is midline no other cranial nerves could be performed she has definite right upper extremity pronator drift with weakness compared to left upper extremity she cannot lift the right leg but she can lift the left leg.  No atrophy or fasciculations no resting tremor reflexes brisker in the right arm compared to left but symmetrical in the legs toes downgoing both sides sensation normal light touch throughout no sensory level or asymmetry between the left leg of the right leg coordination dysmetria in the right upper extremity Station gait was impossible heart is regular without murmur neck supple without bruits extremities no clubbing cyanosis edema visual acuity she could not count fingers    Results review:   I reviewed the patient's new clinical results.    Data review:  Lab Results (last 24 hours)     Procedure Component Value Units Date/Time    Comprehensive Metabolic Panel [[id number]]  (Abnormal) Collected: 08/11/21 0344    Specimen: Blood Updated: 08/11/21 0446     Glucose 106 mg/dL      BUN 7 mg/dL      Creatinine 0.42 mg/dL      Sodium 137 mmol/L      Potassium 3.8 mmol/L      Chloride 94 mmol/L      CO2 38.9 mmol/L      Calcium 8.4 mg/dL      Total Protein 5.9 g/dL      Albumin 2.80 g/dL      ALT (SGPT) 20 U/L      AST (SGOT) 41 U/L      Alkaline Phosphatase 67 U/L      Total Bilirubin 0.6 mg/dL      eGFR Non African Amer >150 mL/min/1.73      Globulin 3.1 gm/dL      A/G Ratio 0.9 g/dL      BUN/Creatinine Ratio 16.7     Anion Gap 4.1 mmol/L     Narrative:      GFR Normal >60  Chronic Kidney Disease <60  Kidney Failure <15      CBC & Differential [[id number]]  (Abnormal) Collected: 08/11/21 0344    Specimen: Blood Updated: 08/11/21 0430     Narrative:      The following orders were created for panel order CBC & Differential.  Procedure                               Abnormality         Status                     ---------                               -----------         ------                     CBC Auto Differential[[id number]]        Abnormal            Final result                 Please view results for these tests on the individual orders.    CBC Auto Differential [[id number]]  (Abnormal) Collected: 08/11/21 0344    Specimen: Blood Updated: 08/11/21 0430     WBC 9.27 10*3/mm3      RBC 4.20 10*6/mm3      Hemoglobin 13.3 g/dL      Hematocrit 41.9 %      MCV 99.8 fL      MCH 31.7 pg      MCHC 31.7 g/dL      RDW 12.8 %      RDW-SD 46.5 fl      MPV 9.8 fL      Platelets 223 10*3/mm3      Neutrophil % 80.6 %      Lymphocyte % 10.4 %      Monocyte % 7.7 %      Eosinophil % 0.3 %      Basophil % 0.2 %      Immature Grans % 0.8 %      Neutrophils, Absolute 7.48 10*3/mm3      Lymphocytes, Absolute 0.96 10*3/mm3      Monocytes, Absolute 0.71 10*3/mm3      Eosinophils, Absolute 0.03 10*3/mm3      Basophils, Absolute 0.02 10*3/mm3      Immature Grans, Absolute 0.07 10*3/mm3      nRBC 0.0 /100 WBC     Blood Culture - Blood, Arm, Left [[id number]] Collected: 08/07/21 2243    Specimen: Blood from Arm, Left Updated: 08/10/21 2315     Blood Culture No growth at 3 days    Blood Culture - Blood, Arm, Left [[id number]] Collected: 08/07/21 2054    Specimen: Blood from Arm, Left Updated: 08/10/21 2116     Blood Culture No growth at 3 days           Imaging:  Imaging Results (Last 24 Hours)     ** No results found for the last 24 hours. **         PPE worn at all times washed before washed afterwards disposed of everything properly was now within 6 feet of her for more than few minutes during my exam no aerosols used at any point    Assessment and Plan:       Community acquired pneumonia of right lower lobe of lung    Acute on chronic respiratory failure with hypoxia and  hypercapnia (CMS/HCC)    Essential hypertension    Chronic bronchitis (CMS/HCC)    Current smoker    Substance abuse (CMS/HCC)    Interstitial lung disease (CMS/HCC)    This patient has an side neurologic exam.  Indeed she does seem to be weak in her right arm and right leg but her toes are downgoing.  Also by report she has had a work-up that was not consistent with stroke based for an MRI.  She has significant metabolic encephalopathy and incompetent has some longstanding loss of cognitive function secondary to longstanding substance abuse resulting in dementia.  There is superimposed metabolic encephalopathy now primarily because of the pneumonia and hypercapnia associated with her COPD.  She does complain of neck pain and it is at least possible this could be secondary to a spinal cord abnormality but she does not exactly fit the pattern of a Brown-Séquard syndrome  At this point I will re check a plain MRI of the brain as well as a MRI of the cervical spine and further recommendations will be made.        Jean Carlos Soni MD  08/11/21  10:44 EDT

## 2021-08-11 NOTE — PLAN OF CARE
Goal Outcome Evaluation:  Plan of Care Reviewed With: patient        Progress: improving  Outcome Summary: very restless night, ativan given x1 as ordered prn-slight increase in restlessness-mild confusion, reoriented without difficulty. HR and b/p elevated-has received 2 (1x) doses of labetalol. currently on 1L oxygen per NC, SR/ST on the monigtor hr 90's- low 100. New order for PT to see placed.

## 2021-08-11 NOTE — CASE MANAGEMENT/SOCIAL WORK
Continued Stay Note  CHRISTOPHER Gardner     Patient Name: Flores Marie  MRN: [id number]  Today's Date: 8/11/2021    Admit Date: 8/7/2021    Discharge Plan     Row Name 08/11/21 1406       Plan    Plan Comments  Spoke to Kala at [facility] and provided updated clinicals. They can accept when patient is ready for discharge.  Will continue to follow.        Discharge Codes    No documentation.             Sayda Montez RN

## 2021-08-11 NOTE — PLAN OF CARE
Goal Outcome Evaluation:              Outcome Summary: Physical therapy evaluation complete.  Patient very anxious throughout evaluation with intermittent shaking and jerking.  Patient oriented x3, however requires increased time to respond to questions.  Patient performs supine to sit with min assist, however unable to safely stand at this time due to anxiety.  Patient with intermittent difficulty moving right LE, however remains inconsistent in movement throughout evaluation.  Patient unable to attempt standing due to anxiety and extraneous movements.  Will attempt to see for therapy to address deficits, will continue to assess discharge recommendations.

## 2021-08-11 NOTE — PLAN OF CARE
Goal Outcome Evaluation:  Plan of Care Reviewed With: patient        Progress: improving        The patient is continuing to improve, still refuses to wear the bipap except for in short periods; anxiety has improved some throughout the day; BP continues to be elevated but thinks it's possible anxiety related, pt started on home meds today; pt able to stand and pivot to the BSC but very unsteady, passed swallow eval enough to have meds in applesauce and soft foods; pt will have MRI of head and neck tomorrow at 1030 per Dr Soni.

## 2021-08-11 NOTE — THERAPY EVALUATION
Patient Name: Flores Marie  : 1965    MRN: [id number]                              Today's Date: 2021       Admit Date: 2021    Visit Dx: No diagnosis found.  Patient Active Problem List   Diagnosis   • Acute on chronic respiratory failure with hypoxia and hypercapnia (CMS/HCC)   • Essential hypertension   • Chronic bronchitis (CMS/HCC)   • Community acquired pneumonia of right lower lobe of lung   • Current smoker   • Substance abuse (CMS/HCC)   • Interstitial lung disease (CMS/HCC)     Past Medical History:   Diagnosis Date   • Arthritis    • Asthma    • COPD (chronic obstructive pulmonary disease) (CMS/HCC)    • Elevated cholesterol    • Hypertension    • Stroke (CMS/HCC)      History reviewed. No pertinent surgical history.  General Information     Row Name 21 0842          Physical Therapy Time and Intention    Document Type  evaluation  -     Mode of Treatment  physical therapy  -     Row Name 21 0842          General Information    Patient Profile Reviewed  yes  -     Prior Level of Function  independent:;all household mobility  -     Existing Precautions/Restrictions  fall  -     Barriers to Rehab  medically complex pt very anxious throughout evaluation  -     Row Name 21 0842          Living Environment    Lives With  significant other  -     Row Name 21 0842          Home Main Entrance    Number of Stairs, Main Entrance  four  -     Stair Railings, Main Entrance  none  -     Row Name 21 0842          Cognition    Orientation Status (Cognition)  oriented x 3  -       User Key  (r) = Recorded By, (t) = Taken By, (c) = Cosigned By    Initials Name Provider Type     Karen Reid, PT Physical Therapist        Mobility     Row Name 21 0842          Bed Mobility    Bed Mobility  supine-sit;sit-supine  -     Supine-Sit [place] (Bed Mobility)  minimum assist (75% patient effort);verbal cues  -     Sit-Supine [place] (Bed  Mobility)  standby assist;verbal cues  -     Assistive Device (Bed Mobility)  bed rails;head of bed elevated  -     Comment (Bed Mobility)  pt requires verbal cues for sequencing.  pt very anxious with difficulty following commands  -     Row Name 08/11/21 0842          Gait/Stairs (Locomotion)    Comment (Gait/Stairs)  unable to safely attempt at this time  -       User Key  (r) = Recorded By, (t) = Taken By, (c) = Cosigned By    Initials Name Provider Type     Karen Reid PT Physical Therapist        Obj/Interventions     Row Name 08/11/21 0842          Range of Motion Comprehensive    Comment, General Range of Motion  difficult to formally assess due to anxiety  -[facility] Name 08/11/21 0842          Strength Comprehensive (MMT)    Comment, General Manual Muscle Testing (MMT) Assessment  difficult to formally assess due to anxiety  -       User Key  (r) = Recorded By, (t) = Taken By, (c) = Cosigned By    Initials Name Provider Type     Karen Reid PT Physical Therapist        Goals/Plan     [facility] Name 08/11/21 0842          Bed Mobility Goal 1 (PT)    Activity/Assistive Device (Bed Mobility Goal 1, PT)  bed mobility activities, all  -     [place] Level/Cues Needed (Bed Mobility Goal 1, PT)  supervision required  -     Time Frame (Bed Mobility Goal 1, PT)  3 days  -JW     Progress/Outcomes (Bed Mobility Goal 1, PT)  goal ongoing  -[facility] Name 08/11/21 0842          Transfer Goal 1 (PT)    Activity/Assistive Device (Transfer Goal 1, PT)  transfers, all  -     [place] Level/Cues Needed (Transfer Goal 1, PT)  supervision required  -     Time Frame (Transfer Goal 1, PT)  3 days  -JW     Progress/Outcome (Transfer Goal 1, PT)  goal ongoing  -[facility] Name 08/11/21 0842          Gait Training Goal 1 (PT)    Activity/Assistive Device (Gait Training Goal 1, PT)  gait (walking locomotion);assistive device use  -     [place] Level (Gait Training Goal 1, PT)  supervision  "required  -JW     Distance (Gait Training Goal 1, PT)  50  -JW     Time Frame (Gait Training Goal 1, PT)  3 days  -JW     Progress/Outcome (Gait Training Goal 1, PT)  goal ongoing  -       User Key  (r) = Recorded By, (t) = Taken By, (c) = Cosigned By    Initials Name Provider Type    Karen Ramírez, ANDREAS Physical Therapist        Clinical Impression     Row Name 08/11/21 0842          Pain    Additional Documentation  -- no c/o pain  -JW     Row Name 08/11/21 0842          Plan of Care Review    Outcome Summary  Physical therapy evaluation complete.  Patient very anxious throughout evaluation with intermittent shaking and jerking.  Patient oriented x3, however requires increased time to respond to questions.  Patient performs supine to sit with min assist, however unable to safely stand at this time due to anxiety.  Patient with intermittent difficulty moving right LE, however remains inconsistent in movement throughout evaluation.  Patient unable to attempt standing due to anxiety and extraneous movements.  Will attempt to see for therapy to address deficits, will continue to assess discharge recommendations.  -[facility] Name 08/11/21 0842          Therapy Assessment/Plan (PT)    Patient/Family Therapy Goals Statement (PT)  \"go home\"  -     Rehab Potential (PT)  good, to achieve stated therapy goals  -     Criteria for Skilled Interventions Met (PT)  meets criteria;yes  -     Predicted Duration of Therapy Intervention (PT)  3 days  -[facility] Name 08/11/21 0842          Positioning and Restraints    Pre-Treatment Position  in bed  -JW     Post Treatment Position  bed  -JW     In Bed  supine;encouraged to call for assist;call light within reach  -       User Key  (r) = Recorded By, (t) = Taken By, (c) = Cosigned By    Initials Name Provider Type    Karen Ramírez, ANDREAS Physical Therapist        Outcome Measures     Row Name 08/11/21 0842          How much help from another person do you currently " need...    Turning from your back to your side while in flat bed without using bedrails?  3  -JW     Moving from lying on back to sitting on the side of a flat bed without bedrails?  3  -JW     Moving to and from a bed to a chair (including a wheelchair)?  1  -JW     Standing up from a chair using your arms (e.g., wheelchair, bedside chair)?  1  -JW     Climbing 3-5 steps with a railing?  1  -JW     To walk in hospital room?  1  -JW     AM-PAC 6 Clicks Score (PT)  10  -     Row Name 08/11/21 0842          Functional Assessment    Outcome Measure Options  AM-PAC 6 Clicks Basic Mobility (PT)  -       User Key  (r) = Recorded By, (t) = Taken By, (c) = Cosigned By    Initials Name Provider Type    Karen Ramírez, PT Physical Therapist                       Physical Therapy Education                 Title: PT OT SLP Therapies (In Progress)     Topic: Physical Therapy (In Progress)     Point: Mobility training (Done)     Learning Progress Summary           Patient Acceptance, E,TB, VU by  at 8/11/2021 0944                   Point: Home exercise program (Not Started)     Learner Progress:  Not documented in this visit.                      User Key     Initials Effective Dates Name Provider Type Discipline     06/16/21 -  Karen Reid, ANDREAS Physical Therapist PT              PT Recommendation and Plan  Planned Therapy Interventions (PT): balance training, bed mobility training, gait training, home exercise program, patient/family education, strengthening, transfer training  Outcome Summary: Physical therapy evaluation complete.  Patient very anxious throughout evaluation with intermittent shaking and jerking.  Patient oriented x3, however requires increased time to respond to questions.  Patient performs supine to sit with min assist, however unable to safely stand at this time due to anxiety.  Patient with intermittent difficulty moving right LE, however remains inconsistent in movement throughout evaluation.   Patient unable to attempt standing due to anxiety and extraneous movements.  Will attempt to see for therapy to address deficits, will continue to assess discharge recommendations.     Time Calculation:   PT Charges     Row Name 08/11/21 0842             Time Calculation    Start Time  0842  -      Stop Time  0854  -      Time Calculation (min)  12 min  -DONY      PT Received On  08/11/21  -DONY      PT - Next Appointment  08/12/21  -        User Key  (r) = Recorded By, (t) = Taken By, (c) = Cosigned By    Initials Name Provider Type    Karen Ramírez, PT Physical Therapist        Therapy Charges for Today     Code Description Service Date Service Provider Modifiers Qty    22607238699 HC PT EVAL LOW COMPLEXITY 1 8/11/2021 Karen Reid, PT GP 1          PT G-Codes  Outcome Measure Options: AM-PAC 6 Clicks Basic Mobility (PT)  AM-PAC 6 Clicks Score (PT): 10    Karen Reid, ANDREAS  8/11/2021

## 2021-08-11 NOTE — PLAN OF CARE
Goal Outcome Evaluation:  Plan of Care Reviewed With: patient, other (see comments) (RNs, Leif and Alisha; MD, Dr. Vargas)           Outcome Summary: SLP: Pt continues w/limited po intake but functional appearing oropharyngeal swallow. Pt with no overt s/s of aspiration on thins from cup, straw, puree or small bite of solid. Reports fear of choking but also reports no history of swallowing problems. She is demonstrating significant anxiety at this time. Recommend to start a soft ground diet with thin liquids and meds whole or crushed as tolerated in puree. Aspiration precautions, oral care before breakfast and after meals. SLP will follow up for meal assessment as tolerated.

## 2021-08-11 NOTE — PROGRESS NOTES
[facility] INPATIENT PROGRESS NOTE         [facility] ICU    2021      PATIENT IDENTIFICATION:  Name: Flores Marie ADMIT: 2021   : 1965  PCP: Sayda Burnett MD    MRN: [id number] LOS: 4 days   AGE/SEX: 56 y.o. female  ROOM: ICU6/                     LOS 4    Reason for visit: Confusion, encephalopathy       SUBJECTIVE:     Very SOB this morning, having some increase anxiety as well, no wheezing   Review of Systems   Constitutional: Negative for chills and fever.   HENT: Negative for rhinorrhea and trouble swallowing.    Respiratory: Positive for cough and shortness of breath. Negative for wheezing and stridor.    Cardiovascular: Negative for chest pain and leg swelling.   Gastrointestinal: Negative for diarrhea, nausea and vomiting.         Objective   OBJECTIVE:    Vital Sign Min/Max for last 24 hours  Temp  Min: 97.6 °F (36.4 °C)  Max: 98.3 °F (36.8 °C)   BP  Min: 114/90  Max: 183/124   Pulse  Min: 94  Max: 144   Resp  Min: 20  Max: 32   SpO2  Min: 84 %  Max: 97 %   No data recorded   Weight  Min: 65.8 kg (145 lb 1.6 oz)  Max: 65.8 kg (145 lb 1.6 oz)                   FiO2 (%): 29 %     Body mass index is 24.15 kg/m².    Intake/Output Summary (Last 24 hours) at 2021 0813  Last data filed at 2021 0355  Gross per 24 hour   Intake 250.87 ml   Output 1700 ml   Net -1449.13 ml         Exam:  GEN:  Intubated, No distress, appears stated age  EYES:   PERRL, anicteric sclerae  ENT:    External ears/nose normal, OP clear  NECK:  No adenopathy, midline trachea  LUNGS: Prolonged expiratory phase, no wheezing   CV:  Normal S1S2, without murmur  ABD:  Nontender, nodistended,   EXT:  No edema.  No cyanosis or clubbing.   Neuro: moving all 4 extremities, following commands, AOx3        Scheduled meds:  cefepime, 2 g, Intravenous, Q8H  chlorhexidine, 15 mL, Mouth/Throat, Q12H  enoxaparin, 40 mg, Subcutaneous, Nightly  ipratropium-albuterol, 3 mL, Nebulization, Q6H -  RT  metroNIDAZOLE, 500 mg, Intravenous, Q8H  pantoprazole, 40 mg, Intravenous, Q24H  sodium chloride, 10 mL, Intravenous, Q12H      IV meds:                      dexmedetomidine, 0.2-1.5 mcg/kg/hr, Last Rate: Stopped (08/10/21 1830)      Data Review:  Results from last 7 days   Lab Units 08/11/21  0344 08/10/21  0424 08/10/21  0424 08/09/21  0419 08/09/21  0419 08/08/21  0427 08/08/21  0427 08/07/21  2052 08/07/21  2052   SODIUM mmol/L 137  --  135*  --  138  --  135*  --  136   POTASSIUM mmol/L 3.8  --  4.3  --  4.7  --  5.1  --  4.3   CHLORIDE mmol/L 94*  --  94*  --  99  --  98  --  100   CO2 mmol/L 38.9*  --  40.3*  --  34.8*  --  33.0*  --  31.8*   BUN mg/dL 7  --  10  --  13  --  20  --  23*   CREATININE mg/dL 0.42*  --  0.40*  --  0.52*  --  0.62  --  0.71   GLUCOSE mg/dL 106*   < > 133*   < > 150*   < > 99   < > 100*   CALCIUM mg/dL 8.4*  --  8.2*  --  8.4*  --  8.6  --  7.7*    < > = values in this interval not displayed.         Estimated Creatinine Clearance: 157.2 mL/min (A) (by C-G formula based on SCr of 0.42 mg/dL (L)).  Results from last 7 days   Lab Units 08/11/21 0344 08/09/21 0419 08/08/21 0427 08/07/21 2053   WBC 10*3/mm3 9.27 13.40* 12.16* 11.81*   HEMOGLOBIN g/dL 13.3 13.0 14.6 13.7   PLATELETS 10*3/mm3 223 203 222 195         Results from last 7 days   Lab Units 08/11/21 0344 08/09/21 0419   ALT (SGPT) U/L 20 13   AST (SGOT) U/L 41* 20     Results from last 7 days   Lab Units 08/10/21  0450 08/09/21  0800 08/08/21  0545 08/07/21  2110   PH, ARTERIAL pH units 7.376 7.243* 7.287* 7.366   PO2 ART mm Hg 71.6* 77.2* 71.9* 78.4*   PCO2, ARTERIAL mm Hg 75.2* 94.0* 74.4* 59.2*   HCO3 ART mmol/L 44.0* 40.5* 35.5* 33.9*     Results from last 7 days   Lab Units 08/10/21  0424 08/09/21  0419   PROCALCITONIN ng/mL 0.02 0.03         No results found for: HGBA1C, POCGLU            Active Hospital Problems    Diagnosis  POA   • Respiratory failure with hypoxia (CMS/HCC) [J96.91]  Yes      Resolved  Hospital Problems   No resolved problems to display.         ASSESSMENT:  Acute encephalopathy (resolving)  Acute respiratory failure  Likely Chronic Hypercapnia   Pneumonia, RVP neg, MRSA screen neg, PCT neg  History of illicit drug abuse  COPD  Smoker     Recommendations:   - significantly improved but still very SOB, not on inhalers at home, continue duonebs for now and will add inhaled budesonide    - anxiety per primary  - Continue cefepime/metronidazole for LLL infitrate, for 5 day course, possible this is aspiration with her poor swallow and history of illicit DA.            Efe Rubi MD  Pulmonary and Critical Care Medicine  [facility], [facility]  8/11/2021  08:13 EDT

## 2021-08-11 NOTE — PROGRESS NOTES
[facility] Medicine Services  INPATIENT PROGRESS NOTE  ICU6/1   Hospitalist Team  LOS 4 days      Patient Care Team:  Sayda Burnett MD as PCP - General        Chief Complaint / Subjective  Short of breath    HPI (4 hpi elements or status of 3 chronic)  57 y/o F with hx of CVA in April-march 2021, prior hx of substance abuse, chronic cervicalgia, prior EtOH abuse, was taken to [facility] ED earlier today by daughter after she received a phone call from patient's boyfriend stating that she was not in her usual state of health and had poor oral intake, he claimed last time patient had eaten was 2-3 days ago. When patient's daughter arrived to patient, (last seen Monday 8/2), patient was found to be altered, only oriented to self, musky gray skin color and she was breathless. Upon ER arrival, patient;s daughter and NOK was informed about her rapid decline in respiratory distress and blood pressure and proceeded to be endotracheally intubated.   Per patient's daughter patient has cut down in tobacco use due to worsening respiratory distress and also stopped drinking ETOH and prior use of methamphetamines.      Not vaccinated for Covid  Extubated yesterday      History taken from: patient chart    Review of Systems   Constitutional: Negative.   HENT: Negative.    Eyes: Negative.    Cardiovascular: Negative.    Respiratory: Positive for shortness of breath.    Endocrine: Negative.    Hematologic/Lymphatic: Negative.    Skin: Negative.    Musculoskeletal: Negative.    Gastrointestinal: Negative.    Genitourinary: Negative.    Neurological: Negative.    Psychiatric/Behavioral: Negative.    Allergic/Immunologic: Negative.    All other systems reviewed and are negative.            History reviewed. No pertinent family history.    Past Medical History:   Diagnosis Date   • Arthritis    • Asthma    • COPD (chronic obstructive pulmonary disease) (CMS/[facility])    • Elevated cholesterol    •  "Hypertension    • Stroke (CMS/[facility])        Social History     Socioeconomic History   • Marital status:      Spouse name: Not on file   • Number of children: Not on file   • Years of education: Not on file   • Highest education level: Not on file   Tobacco Use   • Smoking status: Current Every Day Smoker     Packs/day: 0.50     Years: 10.00     Pack years: 5.00     Types: Cigarettes   • Tobacco comment: intubated/sedated   Substance and Sexual Activity   • Alcohol use: Not Currently   • Drug use: Not Currently   • Sexual activity: Not Currently       Prior to Admission medications    Medication Sig Start Date End Date Taking? Authorizing Provider   atorvastatin (LIPITOR) 40 MG tablet Take 40 mg by mouth Daily.   Yes Alan Cool MD   baclofen (LIORESAL) 10 MG tablet Take 10 mg by mouth 3 (Three) Times a Day.   Yes Alan Cool MD   fluticasone (FLONASE) 50 MCG/ACT nasal spray 2 sprays into the nostril(s) as directed by provider Daily.   Yes Alan Cool MD   lisinopril (PRINIVIL,ZESTRIL) 20 MG tablet Take 20 mg by mouth Daily.   Yes Alan Cool MD   metoprolol tartrate (LOPRESSOR) 50 MG tablet Take 50 mg by mouth 2 (Two) Times a Day.   Yes Alan Cool MD   pantoprazole (PROTONIX) 40 MG EC tablet Take 40 mg by mouth Daily.   Yes Alan Cool MD   QUEtiapine (SEROquel) 100 MG tablet Take 100 mg by mouth Every Night.   Yes Alan Cool MD        Objective    Physical Exam     Vital Signs  Temp:  [97.6 °F (36.4 °C)-98.3 °F (36.8 °C)] 98.3 °F (36.8 °C)  Heart Rate:  [] 102  Resp:  [20-32] 28  BP: (114-183)/() 180/113    Oxygen Therapy  SpO2: 91 %  Pulse Oximetry Type: Continuous  Device (Oxygen Therapy): nasal cannula  Flow (L/min): 1  Oxygen Concentration (%): 40  ETCO2 (mmHg): 59 mmHg  Flowsheet Rows      First Filed Value   Admission Height  165.1 cm (65\") Documented at 08/07/2021 2000   Admission Weight  65.1 kg (143 lb 9.6 oz) " Documented at 08/07/2021 2000        Weight change:    Intake & Output (last 3 days)       08/08 0701 - 08/09 0700 08/09 0701 - 08/10 0700 08/10 0701 - 08/11 0700 08/11 0701 - 08/12 0700    I.V. (mL/kg) 155 (2.4) 444.3 (6.8) 100.9 (1.5)     Other 260 853      NG/ 929      IV Piggyback 400 300 150     Total Intake(mL/kg) 1071 (16.5) 2526.3 (38.9) 250.9 (3.8)     Urine (mL/kg/hr) 1225 (0.8) 800 (0.5) 1700 (1.1)     Stool  0 0     Total Output 7272 878 6523     Net -154 +1726.3 -1449.1             Stool Unmeasured Occurrence  1 x 5 x         Lines, Drains & Airways    Active LDAs     Name:   Placement date:   Placement time:   Site:   Days:    Peripheral IV 08/07/21 Posterior;Right Hand   08/07/21    --    Hand   4    Peripheral IV 08/09/21 0124 Distal;Left;Posterior Forearm   08/09/21    0124    Forearm   2    Urethral Catheter 16 Fr.   08/07/21    --     4    ETT    08/07/21    --     4                Physical Exam:    Physical Exam  Vitals and nursing note reviewed.   Constitutional:       General: She is not in acute distress.     Appearance: She is well-developed. She is not diaphoretic.   HENT:      Head: Normocephalic and atraumatic.      Right Ear: External ear normal.      Left Ear: External ear normal.      Nose: Nose normal.      Mouth/Throat:      Pharynx: No oropharyngeal exudate.   Eyes:      General: No scleral icterus.        Right eye: No discharge.         Left eye: No discharge.      Conjunctiva/sclera: Conjunctivae normal.      Pupils: Pupils are equal, round, and reactive to light.   Neck:      Thyroid: No thyromegaly.      Vascular: No JVD.      Trachea: No tracheal deviation.   Cardiovascular:      Rate and Rhythm: Normal rate and regular rhythm.      Heart sounds: Normal heart sounds. No murmur heard.   No friction rub. No gallop.    Pulmonary:      Effort: Pulmonary effort is normal.      Breath sounds: No stridor. Wheezing present.      Comments: Absent breath sound bibasilar  Abdominal:       General: Bowel sounds are normal. There is no distension.      Palpations: Abdomen is soft. There is no mass.      Tenderness: There is no abdominal tenderness. There is no guarding or rebound.      Hernia: No hernia is present.   Musculoskeletal:         General: No tenderness or deformity. Normal range of motion.      Cervical back: Normal range of motion and neck supple.   Lymphadenopathy:      Cervical: No cervical adenopathy.   Skin:     General: Skin is warm and dry.      Coloration: Skin is not pale.      Findings: No erythema or rash.   Neurological:      Mental Status: She is alert and oriented to person, place, and time.      Cranial Nerves: No cranial nerve deficit.      Sensory: No sensory deficit.      Motor: No abnormal muscle tone.      Coordination: Coordination normal.   Psychiatric:         Behavior: Behavior normal.         Thought Content: Thought content normal.         Judgment: Judgment normal.                   PT Recommendation and Plan             Procedures:    * No surgery found *     Assessment/Plan with Problem wise       Active Hospital Problems    Diagnosis  POA   • **Community acquired pneumonia of right lower lobe of lung [J18.9]  Yes   • Essential hypertension [I10]  Yes   • Chronic bronchitis (CMS/HCC) [J42]  Yes   • Current smoker [F17.200]  Yes   • Respiratory failure with hypoxia (CMS/HCC) [J96.91]  Yes      Resolved Hospital Problems   No resolved problems to display.        Estimated Creatinine Clearance: 157.2 mL/min (A) (by C-G formula based on SCr of 0.42 mg/dL (L)).    Code Status and Medical Interventions:   Ordered at: 08/10/21 9035     Level Of Support Discussed With:    Patient     Code Status:    No CPR     Medical Interventions (Level of Support Prior to Arrest):    Full       MEDICAL DECISION MAKING COMPLEXITY BY PROBLEM:       Pneumonia:  Antibiotics-goal 5d course for uncomplicated pneumonia  Routine sputum culture not indicated per IDSA guidelines  Negative  MRSA screen, high negative predictive value for MRSA pneumonia  Bronchodilators if needed  Oxygen supplementation if needed to keep sat between 88 to 92%  Expectorants if needed  Antipyretics  Supportive treatment  Follow chest x-ray if needed  Follow labs if appropriate  Antibiotic probably could be deescalated noted Gram stain from ET suction specimen.  Defer to pulmonology on case    Hypertension:  Continue home medications   Options include-  beta-blockers  Calcium channel blockers  ACE inhibitor  Vasodilators  Low-dose diuretics  PRN medications have not been shown to affect outcomes-to be avoided      Smoking:  Counseled to quit smoking  Long-term compliance is suspect  Nicotine replacement while in the hospital    Respiratory failure:  Oxygen support to keep saturation between 88 to 92%  Bronchodilators if needed  Steroids if needed  Steroid nebulizers treatments.  Mucolytics and breathing treatment if needed  Antibiotics as needed if appropriate  Supportive treatment        Condition on discharge improved  Care coordination with nursing for current care 08/11/21 8:46 AM EDT.    Plan for disposition:  anticipate pt will need 30 days or less of rehab            Continued Care and Services - Admitted Since 8/7/2021     Destination     Service Provider Request Status Selected Services Address Phone Fax Patient Preferred    [facility]  Pending - No Request Sent N/A [address] [place] [address] [phone] --               Historical & Objective Data     Results Review:    I reviewed the patient's new lab and radiology results. 08/11/21     Results from last 7 days   Lab Units 08/11/21  0344 08/09/21  0419 08/08/21  0427   WBC 10*3/mm3 9.27 13.40* 12.16*   HEMOGLOBIN g/dL 13.3 13.0 14.6   HEMATOCRIT % 41.9 42.9 47.2*   MCV fL 99.8* 105.1* 101.1*   MCH pg 31.7 31.9 31.3   MPV fL 9.8 9.8 10.0   RDW % 12.8 12.9 13.2   PLATELETS 10*3/mm3 223 203 222     Results from last 7 days   Lab Units  08/11/21  0344 08/10/21  0424 08/09/21  0419 08/08/21 0427 08/08/21 0427 08/07/21 2052 08/07/21 2052   SODIUM mmol/L 137 135* 138   < > 135*   < > 136   POTASSIUM mmol/L 3.8 4.3 4.7   < > 5.1   < > 4.3   CHLORIDE mmol/L 94* 94* 99   < > 98   < > 100   CO2 mmol/L 38.9* 40.3* 34.8*   < > 33.0*   < > 31.8*   BUN mg/dL 7 10 13   < > 20   < > 23*   CREATININE mg/dL 0.42* 0.40* 0.52*   < > 0.62   < > 0.71   CALCIUM mg/dL 8.4* 8.2* 8.4*   < > 8.6   < > 7.7*   MAGNESIUM mg/dL  --   --  2.0  --  2.0  --  1.9   BILIRUBIN mg/dL 0.6  --  0.2  --   --   --   --    ALK PHOS U/L 67  --  68  --   --   --   --    ALT (SGPT) U/L 20  --  13  --   --   --   --    AST (SGOT) U/L 41*  --  20  --   --   --   --    GLUCOSE mg/dL 106* 133* 150*   < > 99   < > 100*    < > = values in this interval not displayed.     Inflammatory Biomarkers        Invalid input(s): ESR, D-DIMER QUANTITATIVE,  PROCALCITONIN,  alllabslink(lactate:5)@ )@  Lab Results   Component Value Date    PHOS 2.0 (L) 08/10/2021     No results found for: HGBA1C  No results found for: CHOL, CHLPL, TRIG, HDL, LDL, LDLDIRECT  Lab Results   Component Value Date    LIPASE 16.00 12/16/2014           Results from last 7 days   Lab Units 08/10/21  0450   PH, ARTERIAL pH units 7.376   PO2 ART mm Hg 71.6*   PCO2, ARTERIAL mm Hg 75.2*   HCO3 ART mmol/L 44.0*     Pathology  No results found for: INTRAOP, PREDX, FINALDX, COMDX  External COVID19   Date Value Ref Range Status   08/04/2020 Not Detected Not Detected Final     Comment:     Caution should be exercised when interpreting a result of 'Not Detected'. A result of 'Not Detected' does not rule out COVID-19 and cannot be used as sole basis for treatment or patient management decisions. If COVID-19 is still suspected following a 'Not Detected' result, re-testing should be considered.        Microbiology Results (last 10 days)     Procedure Component Value - Date/Time    Respiratory Panel, PCR (WITHOUT COVID) - Swab, Nasopharynx  [[id number]]  (Normal) Collected: 08/08/21 1952    Lab Status: Final result Specimen: Swab from Nasopharynx Updated: 08/09/21 1128     ADENOVIRUS, PCR Not Detected     Coronavirus 229E Not Detected     Coronavirus HKU1 Not Detected     Coronavirus NL63 Not Detected     Coronavirus OC43 Not Detected     Human Metapneumovirus Not Detected     Human Rhinovirus/Enterovirus Not Detected     Influenza B PCR Not Detected     Parainfluenza Virus 1 Not Detected     Parainfluenza Virus 2 Not Detected     Parainfluenza Virus 3 Not Detected     Parainfluenza Virus 4 Not Detected     Bordetella pertussis pcr Not Detected     Chlamydophila pneumoniae PCR Not Detected     Mycoplasma pneumo by PCR Not Detected     Influenza A PCR Not Detected     RSV, PCR Not Detected     Bordetella parapertussis PCR Not Detected    Narrative:      The coronavirus on the RVP is NOT COVID-19 and is NOT indicative of infection with COVID-19.    In the setting of a positive respiratory panel with a viral infection PLUS a negative procalcitonin without other underlying concern for bacterial infection, consider observing off antibiotics or discontinuation of antibiotics and continue supportive care. If the respiratory panel is positive for atypical bacterial infection (Bordetella pertussis, Chlamydophila pneumoniae, or Mycoplasma pneumoniae), consider antibiotic de-escalation to target atypical bacterial infection.    Respiratory Culture - Sputum, ET Suction [[id number]] Collected: 08/08/21 1337    Lab Status: Preliminary result Specimen: Sputum from ET Suction Updated: 08/10/21 1233     Respiratory Culture Rare Normal Respiratory Ena: NO S.aureus/MRSA or Pseudomonas aeruginosa     Gram Stain Moderate (3+) WBCs seen      No epithelial cells seen      Rare (1+) Normal respiratory ena    Narrative:      Organism under investigation.      S. Pneumo Ag Urine or CSF - Urine, Urine, Clean Catch [[id number]]  (Normal) Collected: 08/08/21 1316    Lab Status:  Final result Specimen: Urine, Clean Catch Updated: 08/08/21 1346     Strep Pneumo Ag Negative    Legionella Antigen, Urine - Urine, Urine, Clean Catch [[id number]]  (Normal) Collected: 08/08/21 1316    Lab Status: Final result Specimen: Urine, Clean Catch Updated: 08/08/21 1346     LEGIONELLA ANTIGEN, URINE Negative    Respiratory Culture - Aspirate, ET Suction [[id number]] Collected: 08/08/21 0438    Lab Status: Final result Specimen: Aspirate from ET Suction Updated: 08/10/21 1208     Respiratory Culture Scant growth (1+) Normal Respiratory Ena: NO S.aureus/MRSA or Pseudomonas aeruginosa     Gram Stain Many (4+) WBCs per low power field      Rare (1+) Gram positive cocci in pairs      Rare (1+) Normal respiratory ena    MRSA Screen Culture (Outpatient) - Swab, Nares [[id number]]  (Normal) Collected: 08/08/21 0150    Lab Status: Final result Specimen: Swab from Nares Updated: 08/09/21 1249     MRSA Screen Cx No Methicillin Resistant Staphylococcus aureus isolated    Blood Culture - Blood, Arm, Left [[id number]] Collected: 08/07/21 2243    Lab Status: Preliminary result Specimen: Blood from Arm, Left Updated: 08/10/21 2315     Blood Culture No growth at 3 days    Blood Culture - Blood, Arm, Left [[id number]] Collected: 08/07/21 2054    Lab Status: Preliminary result Specimen: Blood from Arm, Left Updated: 08/10/21 2116     Blood Culture No growth at 3 days          ECG/EMG Results (most recent)     Procedure Component Value Units Date/Time    ECG 12 Lead [[id number]] Collected: 08/11/21 0204     Updated: 08/11/21 0727     QT Interval 341 ms     Narrative:      HEART RATE= 93  bpm  RR Interval= 644  ms  WY Interval= 144  ms  P Horizontal Axis= -36  deg  P Front Axis= 75  deg  QRSD Interval= 86  ms  QT Interval= 341  ms  QRS Axis= 57  deg  T Wave Axis= 38  deg  - BORDERLINE ECG -  Sinus rhythm  Biatrial enlargement  NO PRIOR TRACING AVAILABLE FOR COMPARISON  Electronically Signed By: Taiwo Hadley ([facility]) 11-Aug-2021  07:26:39  Date and Time of Study: 2021-08-11 02:04:19                  XR Chest 1 View    Result Date: 8/9/2021  Worsening left lower lung infiltrates with stable infiltrates at the right base. Signer Name: Mirza Ruiz MD  Signed: 8/9/2021 5:20 AM  Workstation Name: [id number]  [facility]    XR Abdomen KUB    Result Date: 8/8/2021  Dobbhoff tube tip in the mid stomach. Signer Name: Truong Gustafson MD  Signed: 8/8/2021 9:01 AM  Workstation [facility]: [facility]  [facility]      I personally reviewed patient's x-ray films and my findings are: Bibasilar infiltrate.  Hyperinflated chest.  Interstitial lung disease    I personally reviewed patient's EKG strip and my findings are:     Medication Review:   I have reviewed the patient's current medication list 08/11/21     Scheduled Meds  cefepime, 2 g, Intravenous, Q8H  chlorhexidine, 15 mL, Mouth/Throat, Q12H  enoxaparin, 40 mg, Subcutaneous, Nightly  ipratropium-albuterol, 3 mL, Nebulization, Q6H - RT  metroNIDAZOLE, 500 mg, Intravenous, Q8H  pantoprazole, 40 mg, Intravenous, Q24H  sodium chloride, 10 mL, Intravenous, Q12H        Meds Infusions  dexmedetomidine, 0.2-1.5 mcg/kg/hr, Last Rate: Stopped (08/10/21 1830)        DVT prophylaxis  Mechanical Order History:     None      Pharmalogical Order History:      Ordered     Dose Route Frequency Stop    08/07/21 1740  enoxaparin (LOVENOX) syringe 40 mg      40 mg SC Nightly --                Meds PRN  fentaNYL citrate (PF)  •  LORazepam  •  nitroglycerin  •  sodium chloride  •  sodium chloride      Byron Vargas MD  08/11/21  08:46 EDT

## 2021-08-12 PROBLEM — G95.9 CERVICAL MYELOPATHY (HCC): Chronic | Status: ACTIVE | Noted: 2021-01-01

## 2021-08-12 NOTE — THERAPY TREATMENT NOTE
Acute Care - Physical Therapy Treatment Note  CHRISTOPHER Gardner     Patient Name: Flores Marie  : 1965  MRN: [id number]  Today's Date: 2021      Visit Dx:   No diagnosis found.  Patient Active Problem List   Diagnosis   • Acute on chronic respiratory failure with hypoxia and hypercapnia (CMS/HCC)   • Essential hypertension   • Chronic bronchitis (CMS/HCC)   • Community acquired pneumonia of right lower lobe of lung   • Current smoker   • Substance abuse (CMS/HCC)   • Interstitial lung disease (CMS/HCC)     Past Medical History:   Diagnosis Date   • Arthritis    • Asthma    • COPD (chronic obstructive pulmonary disease) (CMS/HCC)    • Elevated cholesterol    • Hypertension    • Stroke (CMS/HCC)      History reviewed. No pertinent surgical history.     PT Assessment (last 12 hours)      PT Evaluation and Treatment     Row Name 21 1008          Physical Therapy Time and Intention    Subjective Information  -- pt appears anxious throughout treatment session  -     Document Type  therapy note (daily note)  -     Mode of Treatment  physical therapy  -     Patient Effort  fair  -     Comment  pt appears anxious throughout session  -     Row Name 21 1008          General Information    Patient/Family/Caregiver Comments/Observations  pt supine, agrees to therapy with encouragement  -     Existing Precautions/Restrictions  fall  -     Barriers to Rehab  medically complex;cognitive status  -     Row Name 21 1008          Cognition    Personal Safety Interventions  gait belt;nonskid shoes/slippers when out of bed  -     Row Name 21 1008          Pain Scale: Word Pre/Post-Treatment    Pre/Posttreatment Pain Comment  no c/o pain  -     Row Name 21 1008          Bed Mobility    Bed Mobility  supine-sit  -     Supine-Sit McKenney (Bed Mobility)  minimum assist (75% patient effort);verbal cues  -     Assistive Device (Bed Mobility)  bed rails;head of bed elevated   -     Comment (Bed Mobility)  pt requires verbal cues and increased time to complete transfer to EOB  -     Row Name 08/12/21 1008          Transfers    Transfers  stand pivot/stand step transfer  -     Comment (Transfers)  cues for hand placement  -     Row Name 08/12/21 1008          Stand Pivot/Stand Step Transfer    Stand Pivot/Stand Step U[place] (Transfers)  moderate assist (50% patient effort);verbal cues  -     Assistive Device (Stand Pivot Stand Step Transfer)  other (see comments) cues for sequencing and safety  -     Row Name 08/12/21 1008          Gait/Stairs (Locomotion)    Comment (Gait/Stairs)  pt able to complete stand pivot transfer only  -     Row Name 08/12/21 1008          Plan of Care Review    Outcome Summary  PT: Patient remains anxious throughout session.  Patient requires min assist for supine to sit transfer and mod assist for stand pivot from bed to chair.  Patient initially refuses use of rolling walker for transfer, however became agreeable after attempt without device.  Patient continues to display difficulty with coordination/mobility of right side during transfers.  Will continue to follow for therapy needs, recommend SNF with potential for long term care at discharge.  -     Row Name 08/12/21 1008          Positioning and Restraints    Pre-Treatment Position  in bed  -     Post Treatment Position  chair  -     In Chair  reclined;call light within reach;encouraged to call for assist  -     Row Name 08/12/21 1008          Progress Summary (PT)    Progress Toward Functional Goals (PT)  progress toward functional goals is fair  -     Barriers to Overall Progress (PT)  anxiety  -JW       User Key  (r) = Recorded By, (t) = Taken By, (c) = Cosigned By    Initials Name Provider Type    Karen Ramírez, PT Physical Therapist        Physical Therapy Education                 Title: PT OT SLP Therapies (In Progress)     Topic: Physical Therapy (In Progress)      Point: Mobility training (Done)     Learning Progress Summary           Patient Acceptance, E,TB, VU by  at 8/12/2021 1137    Acceptance, E,TB, VU by DONY at 8/11/2021 0944                   Point: Home exercise program (Not Started)     Learner Progress:  Not documented in this visit.                      User Key     Initials Effective Dates Name Provider Type Discipline     06/16/21 -  Karen Reid, PT Physical Therapist PT              PT Recommendation and Plan  Anticipated Discharge Disposition (PT): skilled nursing facility, extended care facility  Planned Therapy Interventions (PT): balance training, bed mobility training, gait training, home exercise program, patient/family education, strengthening, transfer training  Therapy Frequency (PT): 6 times/wk  Progress Summary (PT)  Progress Toward Functional Goals (PT): progress toward functional goals is fair  Barriers to Overall Progress (PT): anxiety  Outcome Summary: PT: Patient remains anxious throughout session.  Patient requires min assist for supine to sit transfer and mod assist for stand pivot from bed to chair.  Patient initially refuses use of rolling walker for transfer, however became agreeable after attempt without device.  Patient continues to display difficulty with coordination/mobility of right side during transfers.  Will continue to follow for therapy needs, recommend SNF with potential for long term care at discharge.  Outcome Measures     Row Name 08/12/21 1008             How much help from another person do you currently need...    Turning from your back to your side while in flat bed without using bedrails?  3  -JW      Moving from lying on back to sitting on the side of a flat bed without bedrails?  3  -JW      Moving to and from a bed to a chair (including a wheelchair)?  2  -JW      Standing up from a chair using your arms (e.g., wheelchair, bedside chair)?  2  -JW      Climbing 3-5 steps with a railing?  1  -JW      To walk in  hospital room?  1  -JW      AM-PAC 6 Clicks Score (PT)  12  -JW         Functional Assessment    Outcome Measure Options  AM-PAC 6 Clicks Basic Mobility (PT)  -JW        User Key  (r) = Recorded By, (t) = Taken By, (c) = Cosigned By    Initials Name Provider Type    Karen Ramírez, PT Physical Therapist           Time Calculation:   PT Charges     Row Name 08/12/21 1138             Time Calculation    Start Time  1008  -      Stop Time  1021  -      Time Calculation (min)  13 min  -JW      PT Received On  08/12/21  -JW      PT - Next Appointment  08/13/21  -         Timed Charges    88735 - PT Therapeutic Exercise Minutes  13  -         Total Minutes    Timed Charges Total Minutes  13  -       Total Minutes  13  -        User Key  (r) = Recorded By, (t) = Taken By, (c) = Cosigned By    Initials Name Provider Type    Karen Ramírez, ANDREAS Physical Therapist        Therapy Charges for Today     Code Description Service Date Service Provider Modifiers Qty    98092947616 HC PT EVAL LOW COMPLEXITY 1 8/11/2021 Karen Reid, PT GP 1    63624931979 HC PT THER PROC EA 15 MIN 8/12/2021 Karen Reid, PT GP 1          PT G-Codes  Outcome Measure Options: AM-PAC 6 Clicks Basic Mobility (PT)  AM-PAC 6 Clicks Score (PT): 12    Karen Reid PT  8/12/2021

## 2021-08-12 NOTE — PLAN OF CARE
Goal Outcome Evaluation:  Plan of Care Reviewed With: patient        Progress: improving  Outcome Summary: has rested better this night, b/p doing better SR/ST on the monitor-90's-120's with activity. o2 at 1L/NC. MRI planned later this morning, PT/OT seeing-now overflow

## 2021-08-12 NOTE — PROGRESS NOTES
Adult Nutrition  Assessment/PES    Patient Name:  Flores Marie  YOB: 1965  MRN: [id number]  Admit Date:  8/7/2021    Assessment Date:  8/12/2021    Recommend: Add Boost Plus with meals to supplement poor appetite and intake.    Will cont to follow and monitor.     Reason for Assessment     Row Name 08/12/21 1447          Reason for Assessment    Reason For Assessment  follow-up protocol     Diagnosis  pulmonary disease resp failure ( off vent), COPD, anxiety, acute enceph, PNA, hx PSA         Nutrition/Diet History     Row Name 08/12/21 1447          Nutrition/Diet History    Typical Food/Fluid Intake  Pt receiving care at visit yesterday afternoon. Today being taken off floor for teste just before lunch, reports she isn't eating in passing.         Anthropometrics     Row Name 08/12/21 1447          Anthropometrics    Weight  -- 145.1#        Body Mass Index (BMI)    BMI Assessment  BMI 18.5-24.9: normal         Labs/Tests/Procedures/Meds     Row Name 08/12/21 1448          Labs/Procedures/Meds    Lab Results Reviewed  reviewed     Lab Results Comments  phso 2 L, glu 150        Diagnostic Tests/Procedures    Diagnostic Test/Procedure Reviewed  reviewed        Medications    Pertinent Medications Reviewed  reviewed         Physical Findings     Row Name 08/12/21 1448          Physical Findings    Overall Physical Appearance  generalized wasting         Estimated/Assessed Needs     Row Name 08/12/21 1448          Estimated/Assessed Needs    Additional Documentation  Calorie Requirements (Group);Fluid Requirements (Group);Protein Requirements (Group)        Calorie Requirements    Estimated Calorie Need Method  Surry-St Jeor     Estimated Calorie Requirement Comment  7014-7591 kcal ( mifflin 1.2-1.4)        Protein Requirements    Est Protein Requirement Amount (gms/kg)  1.2 gm protein 79 gm pro        Fluid Requirements    Estimated Fluid Requirement Method  RDA Method 9405-1559 ml          Nutrition Prescription Ordered     Row Name 08/12/21 1449          Nutrition Prescription PO    Current PO Diet  Soft Texture     Texture  Ground     Fluid Consistency  Thin         Evaluation of Received Nutrient/Fluid Intake     Row Name 08/12/21 1449          Fluid Intake Evaluation    IV Fluid (mL)  277 18%        PO Evaluation    Number of Days PO Intake Evaluated  Insufficient Data     Number of Meals  1     % PO Intake  25               Problem/Interventions:  Problem 1     Row Name 08/12/21 1450          Nutrition Diagnoses Problem 1    Problem 1  Inadequate Nutrient Intake     Etiology (related to)  Medical Diagnosis;Factors Affecting Nutrition     Signs/Symptoms (evidenced by)  Report/Observation               Intervention Goal     Row Name 08/12/21 1450          Intervention Goal    General  Meet nutritional needs for age/condition     PO  Increase intake;PO intake (%)     PO Intake %  55 % or greater     Weight  Maintain weight         Nutrition Intervention     Row Name 08/12/21 1450          Nutrition Intervention    RD/Tech Action  Encourage intake;Recommend/ordered;Follow Tx progress     Recommended/Ordered  Supplement           Education/Evaluation     Row Name 08/12/21 1450          Education    Education  Education not appropriate at this time        Monitor/Evaluation    Monitor  Per protocol;I&O;PO intake;Supplement intake;Pertinent labs;Weight;Symptoms           Electronically signed by:  Kristin Canas RD  08/12/21 14:50 EDT

## 2021-08-12 NOTE — PLAN OF CARE
Goal Outcome Evaluation:              Outcome Summary: PT: Patient remains anxious throughout session.  Patient requires min assist for supine to sit transfer and mod assist for stand pivot from bed to chair.  Patient initially refuses use of rolling walker for transfer, however became agreeable after attempt without device.  Patient continues to display difficulty with coordination/mobility of right side during transfers.  Will continue to follow for therapy needs, recommend SNF with potential for long term care at discharge.

## 2021-08-12 NOTE — PROGRESS NOTES
[facility] INPATIENT PROGRESS NOTE         [facility] ICU    2021      PATIENT IDENTIFICATION:  Name: Flores Marie ADMIT: 2021   : 1965  PCP: Sayda Burnett MD    MRN: [id number] LOS: 5 days   AGE/SEX: 56 y.o. female  ROOM: ICU6/                     LOS 5    Reason for visit: Confusion, encephalopathy       SUBJECTIVE:     Sleeping well this morning, breathing is a little better. Still significant anxiety.   Review of Systems   Constitutional: Negative for chills and fever.   HENT: Negative for congestion and trouble swallowing.    Respiratory: Positive for cough and shortness of breath. Negative for wheezing and stridor.    Cardiovascular: Negative for chest pain and leg swelling.   Gastrointestinal: Negative for diarrhea, nausea and vomiting.         Objective   OBJECTIVE:    Vital Sign Min/Max for last 24 hours  Temp  Min: 97.5 °F (36.4 °C)  Max: 98.4 °F (36.9 °C)   BP  Min: 105/75  Max: 180/113   Pulse  Min: 94  Max: 123   Resp  Min: 20  Max: 30   SpO2  Min: 88 %  Max: 97 %   No data recorded   No data recorded                   FiO2 (%): 29 %     Body mass index is 24.15 kg/m².    Intake/Output Summary (Last 24 hours) at 2021 0526  Last data filed at 2021 0339  Gross per 24 hour   Intake 300 ml   Output 1375 ml   Net -1075 ml         Exam:  GEN:  Intubated, No distress, appears stated age  EYES:   PERRL, anicteric sclerae  ENT:    External ears/nose normal, OP clear  NECK:  No adenopathy, midline trachea  LUNGS: Prolonged expiratory phase, no wheezing   CV:  Normal S1S2, without murmur  ABD:  Nontender, nodistended,   EXT:  No edema.  No cyanosis or clubbing.   Neuro: moving all 4 extremities, following commands, AOx3        Scheduled meds:  budesonide, 0.5 mg, Nebulization, BID - RT  cefepime, 2 g, Intravenous, Q8H  dilTIAZem CD, 240 mg, Oral, Q24H  enoxaparin, 40 mg, Subcutaneous, Nightly  ipratropium-albuterol, 3 mL, Nebulization, Q6H - RT  lisinopril, 20  mg, Oral, Q24H  metroNIDAZOLE, 500 mg, Intravenous, Q8H  nicotine, 1 patch, Transdermal, Q24H  QUEtiapine, 100 mg, Oral, Nightly  sertraline, 50 mg, Oral, Daily  sodium chloride, 10 mL, Intravenous, Q12H      IV meds:                      dexmedetomidine, 0.2-1.5 mcg/kg/hr, Last Rate: Stopped (08/10/21 1830)      Data Review:  Results from last 7 days   Lab Units 08/11/21  0344 08/10/21  0424 08/10/21  0424 08/09/21  0419 08/09/21  0419 08/08/21  0427 08/08/21  0427 08/07/21  2052 08/07/21  2052   SODIUM mmol/L 137  --  135*  --  138  --  135*  --  136   POTASSIUM mmol/L 3.8  --  4.3  --  4.7  --  5.1  --  4.3   CHLORIDE mmol/L 94*  --  94*  --  99  --  98  --  100   CO2 mmol/L 38.9*  --  40.3*  --  34.8*  --  33.0*  --  31.8*   BUN mg/dL 7  --  10  --  13  --  20  --  23*   CREATININE mg/dL 0.42*  --  0.40*  --  0.52*  --  0.62  --  0.71   GLUCOSE mg/dL 106*   < > 133*   < > 150*   < > 99   < > 100*   CALCIUM mg/dL 8.4*  --  8.2*  --  8.4*  --  8.6  --  7.7*    < > = values in this interval not displayed.         Estimated Creatinine Clearance: 157.2 mL/min (A) (by C-G formula based on SCr of 0.42 mg/dL (L)).  Results from last 7 days   Lab Units 08/11/21 0344 08/09/21 0419 08/08/21 0427 08/07/21 2053   WBC 10*3/mm3 9.27 13.40* 12.16* 11.81*   HEMOGLOBIN g/dL 13.3 13.0 14.6 13.7   PLATELETS 10*3/mm3 223 203 222 195         Results from last 7 days   Lab Units 08/11/21  0344 08/09/21  0419   ALT (SGPT) U/L 20 13   AST (SGOT) U/L 41* 20     Results from last 7 days   Lab Units 08/10/21  0450 08/09/21  0800 08/08/21  0545 08/07/21  2110   PH, ARTERIAL pH units 7.376 7.243* 7.287* 7.366   PO2 ART mm Hg 71.6* 77.2* 71.9* 78.4*   PCO2, ARTERIAL mm Hg 75.2* 94.0* 74.4* 59.2*   HCO3 ART mmol/L 44.0* 40.5* 35.5* 33.9*     Results from last 7 days   Lab Units 08/10/21  0424 08/09/21  0419   PROCALCITONIN ng/mL 0.02 0.03         No results found for: HGBA1C, POCGLU            Active Hospital Problems    Diagnosis  POA   •  **Community acquired pneumonia of right lower lobe of lung [J18.9]  Yes   • Essential hypertension [I10]  Yes   • Chronic bronchitis (CMS/HCC) [J42]  Yes   • Current smoker [F17.200]  Yes   • Substance abuse (CMS/HCC) [F19.10]  Clinically Undetermined   • Interstitial lung disease (CMS/HCC) [J84.9]  Yes   • Acute on chronic respiratory failure with hypoxia and hypercapnia (CMS/HCC) [J96.21, J96.22]  Yes      Resolved Hospital Problems   No resolved problems to display.         ASSESSMENT:  Acute encephalopathy (resolving)  Acute respiratory failure  Chronic Hypercapnia   Pneumonia, RVP neg, MRSA screen neg, PCT neg  History of illicit drug abuse  COPD  Smoker     Recommendations:   - would switch to symbicort and spiriva with prn duonebs  - while she would likely benefit from NIV long term with her COPD and chronic hypercapnea, no longer requiring it and she doesn't want to wear it, will stop the bipap     - anxiety per primary  - wean oxygen as tolerates, goal o2 sats 88-94%, she is a chronic CO2 retainer   - Continue cefepime/metronidazole for LLL infitrate, for 5 day course should complete today.             Efe Rubi MD  Pulmonary and Critical Care Medicine  [facility], [facility]  8/12/2021  05:26 EDT

## 2021-08-12 NOTE — CASE MANAGEMENT/SOCIAL WORK
Continued Stay Note  CHRISTOPHER Gardner     Patient Name: Flores Marie  MRN: [id number]  Today's Date: 8/12/2021    Admit Date: 8/7/2021    Discharge Plan     Row Name 08/12/21 1553       Plan    Plan Comments  I spoke with the patient at bedside.  She was very lethargic but would open her eyes to my voice.  I asked her about her discharge plan to [facility] and she verbalized agreement.  CM will continue to follow.        Discharge Codes    No documentation.             Jennifer Hurt RN

## 2021-08-12 NOTE — PLAN OF CARE
Goal Outcome Evaluation:  Plan of Care Reviewed With: patient, daughter        Progress: improving  Outcome Summary: Medsurg telemetry overflow patient. Currently on 1 L NC. Pt continues to have poor PO intake. Pt remains on IV cefepime and IV flagyl. Pt was given small dose of IV ativan for MRI of head and neck today and remains drowsy but arouses easily. Plan was for pt to go to [facility] at discharge, however, MRI of neck showed myelopathy, and per Dr. Soni, pt needs to be transferred to . [facility] for neurosurgery evaluation and possible surgery. Dr. Vargas notified and reached out to [facility]. They will accept and transfer pt once a bed is available. VSS.

## 2021-08-12 NOTE — NURSING NOTE
@ 1620: Norman from [facility] called and stated that Dr. Douglas (hospitalist) has accepted pt for cervical myelopathy, However, there are no beds at this time. They stated they would call once a bed becomes available.     @1650: called and notified pts daughter, Corrine, about pts MRI results and that pt will need to be transferred to [facility] once a bed becomes available to be evaluated by a neurosurgeon. Daughter was agreeable.

## 2021-08-12 NOTE — PROGRESS NOTES
[facility] Medicine Services  INPATIENT PROGRESS NOTE  ICU6/1   Hospitalist Team  LOS 5 days      Patient Care Team:  Sayda Burnett MD as PCP - General        Chief Complaint / Subjective  Short of breath    HPI (4 hpi elements or status of 3 chronic)  57 y/o F with hx of CVA in April-march 2021, prior hx of substance abuse, chronic cervicalgia, prior EtOH abuse, was taken to [facility] ED earlier today by daughter after she received a phone call from patient's boyfriend stating that she was not in her usual state of health and had poor oral intake, he claimed last time patient had eaten was 2-3 days ago. When patient's daughter arrived to patient, (last seen Monday 8/2), patient was found to be altered, only oriented to self, musky gray skin color and she was breathless. Upon ER arrival, patient;s daughter and NOK was informed about her rapid decline in respiratory distress and blood pressure and proceeded to be endotracheally intubated.   Per patient's daughter patient has cut down in tobacco use due to worsening respiratory distress and also stopped drinking ETOH and prior use of methamphetamines.      Not vaccinated for Covid    Very sedated today status post MRI 08/12/21, 3:07 PM EDT    Extubated yesterday      History taken from: patient chart    Review of Systems   Constitutional: Negative.   HENT: Negative.    Eyes: Negative.    Cardiovascular: Negative.    Respiratory: Positive for shortness of breath.    Endocrine: Negative.    Hematologic/Lymphatic: Negative.    Skin: Negative.    Musculoskeletal: Negative.    Gastrointestinal: Negative.    Genitourinary: Negative.    Neurological: Negative.    Psychiatric/Behavioral: Negative.    Allergic/Immunologic: Negative.    All other systems reviewed and are negative.    Noted        History reviewed. No pertinent family history.    Past Medical History:   Diagnosis Date   • Arthritis    • Asthma    • COPD (chronic obstructive  "pulmonary disease) (CMS/[facility])    • Elevated cholesterol    • Hypertension    • Stroke (CMS/[facility])        Social History     Socioeconomic History   • Marital status:      Spouse name: Not on file   • Number of children: Not on file   • Years of education: Not on file   • Highest education level: Not on file   Tobacco Use   • Smoking status: Current Every Day Smoker     Packs/day: 0.50     Years: 10.00     Pack years: 5.00     Types: Cigarettes   • Tobacco comment: intubated/sedated   Substance and Sexual Activity   • Alcohol use: Not Currently   • Drug use: Not Currently   • Sexual activity: Not Currently       Prior to Admission medications    Medication Sig Start Date End Date Taking? Authorizing Provider   atorvastatin (LIPITOR) 40 MG tablet Take 40 mg by mouth Daily.   Yes Alan Cool MD   baclofen (LIORESAL) 10 MG tablet Take 10 mg by mouth 3 (Three) Times a Day.   Yes Alan Cool MD   fluticasone (FLONASE) 50 MCG/ACT nasal spray 2 sprays into the nostril(s) as directed by provider Daily.   Yes Alan Cool MD   lisinopril (PRINIVIL,ZESTRIL) 20 MG tablet Take 20 mg by mouth Daily.   Yes Alan Cool MD   metoprolol tartrate (LOPRESSOR) 50 MG tablet Take 50 mg by mouth 2 (Two) Times a Day.   Yes Alan Cool MD   pantoprazole (PROTONIX) 40 MG EC tablet Take 40 mg by mouth Daily.   Yes Alan Cool MD   QUEtiapine (SEROquel) 100 MG tablet Take 100 mg by mouth Every Night.   Yes Alan Cool MD        Objective    Physical Exam     Vital Signs  Temp:  [97.4 °F (36.3 °C)-98.4 °F (36.9 °C)] 97.4 °F (36.3 °C)  Heart Rate:  [] 97  Resp:  [20-30] 20  BP: (105-166)/() 123/80    Oxygen Therapy  SpO2: 94 %  Pulse Oximetry Type: Continuous  Device (Oxygen Therapy): nasal cannula  Flow (L/min): 1  Oxygen Concentration (%): 35  ETCO2 (mmHg): 59 mmHg  Flowsheet Rows      First Filed Value   Admission Height  165.1 cm (65\") Documented at " 08/07/2021 2000   Admission Weight  65.1 kg (143 lb 9.6 oz) Documented at 08/07/2021 2000        Weight change:    Intake & Output (last 3 days)       08/09 0701 - 08/10 0700 08/10 0701 - 08/11 0700 08/11 0701 - 08/12 0700 08/12 0701 - 08/13 0700    P.O.    120    I.V. (mL/kg) 444.3 (6.8) 100.9 (1.5)      Other 853       NG/       IV Piggyback 300 150 300 150    Total Intake(mL/kg) 2526.3 (38.9) 250.9 (3.8) 300 (4.6) 270 (4.1)    Urine (mL/kg/hr) 800 (0.5) 1700 (1.1) 1375 (0.9)     Stool 0 0 0     Total Output 800 1700 1375     Net +1726.3 -1449.1 -1075 +270            Stool Unmeasured Occurrence 1 x 5 x 2 x         Lines, Drains & Airways    Active LDAs     Name:   Placement date:   Placement time:   Site:   Days:    Peripheral IV 08/07/21 Posterior;Right Hand   08/07/21    --    Hand   4    Peripheral IV 08/09/21 0124 Distal;Left;Posterior Forearm   08/09/21    0124    Forearm   2    Urethral Catheter 16 Fr.   08/07/21    --     4    ETT    08/07/21    --     4                Physical Exam:    Physical Exam  Vitals and nursing note reviewed.   Constitutional:       General: She is not in acute distress.     Appearance: She is well-developed. She is not diaphoretic.   HENT:      Head: Normocephalic and atraumatic.      Right Ear: External ear normal.      Left Ear: External ear normal.      Nose: Nose normal.      Mouth/Throat:      Pharynx: No oropharyngeal exudate.   Eyes:      General: No scleral icterus.        Right eye: No discharge.         Left eye: No discharge.      Conjunctiva/sclera: Conjunctivae normal.      Pupils: Pupils are equal, round, and reactive to light.   Neck:      Thyroid: No thyromegaly.      Vascular: No JVD.      Trachea: No tracheal deviation.   Cardiovascular:      Rate and Rhythm: Normal rate and regular rhythm.      Heart sounds: Normal heart sounds. No murmur heard.   No friction rub. No gallop.    Pulmonary:      Effort: Pulmonary effort is normal.      Breath sounds: No  stridor. Wheezing present.      Comments: Absent breath sound bibasilar  Abdominal:      General: Bowel sounds are normal. There is no distension.      Palpations: Abdomen is soft. There is no mass.      Tenderness: There is no abdominal tenderness. There is no guarding or rebound.      Hernia: No hernia is present.   Musculoskeletal:         General: No tenderness or deformity. Normal range of motion.      Cervical back: Normal range of motion and neck supple.   Lymphadenopathy:      Cervical: No cervical adenopathy.   Skin:     General: Skin is warm and dry.      Coloration: Skin is not pale.      Findings: No erythema or rash.   Neurological:      Mental Status: She is alert and oriented to person, place, and time.      Cranial Nerves: No cranial nerve deficit.      Sensory: No sensory deficit.      Motor: No abnormal muscle tone.      Coordination: Coordination normal.      Deep Tendon Reflexes: Reflexes abnormal (Little inconsistent reflexes brisk triceps and absent biceps.  Could not elicit knee or ankle jerks.  No clonus).   Psychiatric:         Behavior: Behavior normal.         Thought Content: Thought content normal.         Judgment: Judgment normal.                   PT Recommendation and Plan             Procedures:    * No surgery found *     Assessment/Plan with Problem wise       Active Hospital Problems    Diagnosis  POA   • **Community acquired pneumonia of right lower lobe of lung [J18.9]  Yes   • Cervical myelopathy (CMS/HCC) [G95.9]  Yes   • Essential hypertension [I10]  Yes   • Chronic bronchitis (CMS/HCC) [J42]  Yes   • Current smoker [F17.200]  Yes   • Substance abuse (CMS/HCC) [F19.10]  Clinically Undetermined   • Interstitial lung disease (CMS/HCC) [J84.9]  Yes   • Acute on chronic respiratory failure with hypoxia and hypercapnia (CMS/HCC) [J96.21, J96.22]  Yes      Resolved Hospital Problems   No resolved problems to display.        Estimated Creatinine Clearance: 157.2 mL/min (A) (by C-G  formula based on SCr of 0.42 mg/dL (L)).    Code Status and Medical Interventions:   Ordered at: 08/10/21 1712     Level Of Support Discussed With:    Patient     Code Status:    No CPR     Medical Interventions (Level of Support Prior to Arrest):    Full       MEDICAL DECISION MAKING COMPLEXITY BY PROBLEM:       Pneumonia:  Antibiotics-goal 5d course for uncomplicated pneumonia  Routine sputum culture not indicated per IDSA guidelines  Negative MRSA screen, high negative predictive value for MRSA pneumonia  Bronchodilators if needed  Oxygen supplementation if needed to keep sat between 88 to 92%  Expectorants if needed  Antipyretics  Supportive treatment  Follow chest x-ray if needed  Follow labs if appropriate  Antibiotic probably could be deescalated noted Gram stain from ET suction specimen.  Defer to pulmonology on case    Hypertension:  Continue home medications   Options include-  beta-blockers  Calcium channel blockers  ACE inhibitor  Vasodilators  Low-dose diuretics  PRN medications have not been shown to affect outcomes-to be avoided      Smoking:  Counseled to quit smoking  Long-term compliance is suspect  Nicotine replacement while in the hospital    Respiratory failure:  Oxygen support to keep saturation between 88 to 92%  Bronchodilators if needed  Steroids if needed  Steroid nebulizers treatments.  Mucolytics and breathing treatment if needed  Antibiotics as needed if appropriate  Supportive treatment    Cervical myelopathy on MRI  C5-6  C6-7    Condition on discharge improved  Care coordination with nursing for current care 08/11/21 8:46 AM EDT.    Discussed with neurosurgery NP at [facility].  To transfer the patient to the neurosurgery service at [facility] for cervical myelopathy.      Plan for disposition:  anticipate pt will need 30 days or less of rehab            Continued Care and Services - Admitted Since 8/7/2021     Destination     Service Provider Request Status  Selected Services Address Phone Fax Patient Preferred    [facility]  Pending - No Request Sent N/A [address] [id number] [phone] --               Historical & Objective Data     Results Review:    I reviewed the patient's new lab and radiology results. 08/12/21     Results from last 7 days   Lab Units 08/11/21 0344 08/09/21 0419 08/08/21 0427   WBC 10*3/mm3 9.27 13.40* 12.16*   HEMOGLOBIN g/dL 13.3 13.0 14.6   HEMATOCRIT % 41.9 42.9 47.2*   MCV fL 99.8* 105.1* 101.1*   MCH pg 31.7 31.9 31.3   MPV fL 9.8 9.8 10.0   RDW % 12.8 12.9 13.2   PLATELETS 10*3/mm3 223 203 222     Results from last 7 days   Lab Units 08/11/21  0344 08/10/21  0424 08/09/21  0419 08/08/21  0427 08/08/21  0427 08/07/21  2052 08/07/21  2052   SODIUM mmol/L 137 135* 138   < > 135*   < > 136   POTASSIUM mmol/L 3.8 4.3 4.7   < > 5.1   < > 4.3   CHLORIDE mmol/L 94* 94* 99   < > 98   < > 100   CO2 mmol/L 38.9* 40.3* 34.8*   < > 33.0*   < > 31.8*   BUN mg/dL 7 10 13   < > 20   < > 23*   CREATININE mg/dL 0.42* 0.40* 0.52*   < > 0.62   < > 0.71   CALCIUM mg/dL 8.4* 8.2* 8.4*   < > 8.6   < > 7.7*   MAGNESIUM mg/dL  --   --  2.0  --  2.0  --  1.9   BILIRUBIN mg/dL 0.6  --  0.2  --   --   --   --    ALK PHOS U/L 67  --  68  --   --   --   --    ALT (SGPT) U/L 20  --  13  --   --   --   --    AST (SGOT) U/L 41*  --  20  --   --   --   --    GLUCOSE mg/dL 106* 133* 150*   < > 99   < > 100*    < > = values in this interval not displayed.     Inflammatory Biomarkers        Invalid input(s): ESR, D-DIMER QUANTITATIVE,  PROCALCITONIN,  alllabslink(lactate:5)@ )@  Lab Results   Component Value Date    PHOS 2.0 (L) 08/10/2021     No results found for: HGBA1C  No results found for: CHOL, CHLPL, TRIG, HDL, LDL, LDLDIRECT  Lab Results   Component Value Date    LIPASE 16.00 12/16/2014           Results from last 7 days   Lab Units 08/10/21  0450   PH, ARTERIAL pH units 7.376   PO2 ART mm Hg 71.6*   PCO2, ARTERIAL mm Hg 75.2*    HCO3 ART mmol/L 44.0*     Pathology  No results found for: INTRAOP, PREDX, FINALDX, COMDX  External COVID19   Date Value Ref Range Status   08/04/2020 Not Detected Not Detected Final     Comment:     Caution should be exercised when interpreting a result of 'Not Detected'. A result of 'Not Detected' does not rule out COVID-19 and cannot be used as sole basis for treatment or patient management decisions. If COVID-19 is still suspected following a 'Not Detected' result, re-testing should be considered.        Microbiology Results (last 10 days)     Procedure Component Value - Date/Time    Respiratory Panel, PCR (WITHOUT COVID) - Swab, Nasopharynx [[id number]]  (Normal) Collected: 08/08/21 1952    Lab Status: Final result Specimen: Swab from Nasopharynx Updated: 08/09/21 1128     ADENOVIRUS, PCR Not Detected     Coronavirus 229E Not Detected     Coronavirus HKU1 Not Detected     Coronavirus NL63 Not Detected     Coronavirus OC43 Not Detected     Human Metapneumovirus Not Detected     Human Rhinovirus/Enterovirus Not Detected     Influenza B PCR Not Detected     Parainfluenza Virus 1 Not Detected     Parainfluenza Virus 2 Not Detected     Parainfluenza Virus 3 Not Detected     Parainfluenza Virus 4 Not Detected     Bordetella pertussis pcr Not Detected     Chlamydophila pneumoniae PCR Not Detected     Mycoplasma pneumo by PCR Not Detected     Influenza A PCR Not Detected     RSV, PCR Not Detected     Bordetella parapertussis PCR Not Detected    Narrative:      The coronavirus on the RVP is NOT COVID-19 and is NOT indicative of infection with COVID-19.    In the setting of a positive respiratory panel with a viral infection PLUS a negative procalcitonin without other underlying concern for bacterial infection, consider observing off antibiotics or discontinuation of antibiotics and continue supportive care. If the respiratory panel is positive for atypical bacterial infection (Bordetella pertussis, Chlamydophila  pneumoniae, or Mycoplasma pneumoniae), consider antibiotic de-escalation to target atypical bacterial infection.    Respiratory Culture - Sputum, ET Suction [[id number]] Collected: 08/08/21 1337    Lab Status: Final result Specimen: Sputum from ET Suction Updated: 08/11/21 1631     Respiratory Culture Scant growth (1+) Normal Respiratory Ena     Gram Stain Moderate (3+) WBCs seen      No epithelial cells seen      Rare (1+) Normal respiratory ena    S. Pneumo Ag Urine or CSF - Urine, Urine, Clean Catch [[id number]]  (Normal) Collected: 08/08/21 1316    Lab Status: Final result Specimen: Urine, Clean Catch Updated: 08/08/21 1346     Strep Pneumo Ag Negative    Legionella Antigen, Urine - Urine, Urine, Clean Catch [[id number]]  (Normal) Collected: 08/08/21 1316    Lab Status: Final result Specimen: Urine, Clean Catch Updated: 08/08/21 1346     LEGIONELLA ANTIGEN, URINE Negative    Respiratory Culture - Aspirate, ET Suction [[id number]] Collected: 08/08/21 0438    Lab Status: Final result Specimen: Aspirate from ET Suction Updated: 08/10/21 1208     Respiratory Culture Scant growth (1+) Normal Respiratory Ena: NO S.aureus/MRSA or Pseudomonas aeruginosa     Gram Stain Many (4+) WBCs per low power field      Rare (1+) Gram positive cocci in pairs      Rare (1+) Normal respiratory ena    MRSA Screen Culture (Outpatient) - Swab, Nares [[id number]]  (Normal) Collected: 08/08/21 0150    Lab Status: Final result Specimen: Swab from Nares Updated: 08/09/21 1249     MRSA Screen Cx No Methicillin Resistant Staphylococcus aureus isolated    Blood Culture - Blood, Arm, Left [[id number]] Collected: 08/07/21 2243    Lab Status: Preliminary result Specimen: Blood from Arm, Left Updated: 08/11/21 2315     Blood Culture No growth at 4 days    Blood Culture - Blood, Arm, Left [[id number]] Collected: 08/07/21 2054    Lab Status: Preliminary result Specimen: Blood from Arm, Left Updated: 08/11/21 2116     Blood Culture No growth at 4  days          ECG/EMG Results (most recent)     Procedure Component Value Units Date/Time    ECG 12 Lead [[id number]] Collected: 08/11/21 0204     Updated: 08/11/21 0727     QT Interval 341 ms     Narrative:      HEART RATE= 93  bpm  RR Interval= 644  ms  HI Interval= 144  ms  P Horizontal Axis= -36  deg  P Front Axis= 75  deg  QRSD Interval= 86  ms  QT Interval= 341  ms  QRS Axis= 57  deg  T Wave Axis= 38  deg  - BORDERLINE ECG -  Sinus rhythm  Biatrial enlargement  NO PRIOR TRACING AVAILABLE FOR COMPARISON  Electronically Signed By: Taiwo Hadley ([facility]) 11-Aug-2021 07:26:39  Date and Time of Study: 2021-08-11 02:04:19                  MRI Brain Without Contrast    Result Date: 8/12/2021  Impression: 1.  No evidence of restricted diffusion to indicate acute infarction. 2.  Scattered areas of FLAIR hyperintensity within the deep white matter and subcortical white matter of the cerebral hemispheres likely representing microvascular disease in this patient. 3.  Linear area of FLAIR hyperintensity extending from the right temporal lobe toward the mid body of the right lateral ventricle. This likely represents a developmental venous anomaly. 4.  Trace bilateral mastoid effusions. Prominent inflammatory change in the sphenoid sinus with mild inflammatory change in the ethmoid air cells and right maxillary sinus. Signer Name: Cole Mcconnell MD  Signed: 8/12/2021 12:35 PM  Workstation Name: [id number]  [facility]    MRI Cervical Spine Without Contrast    Result Date: 8/12/2021  Severe cervical spondylosis as described. Please see the above report for a complete description of the individual levels. Signer Name: Cole Mcconnell MD  Signed: 8/12/2021 2:11 PM  Workstation Name: [id number]  [facility]    XR Chest 1 View    Result Date: 8/9/2021  Worsening left lower lung infiltrates with stable infiltrates at the right base. Signer Name: Mirza Ruiz MD  Signed: 8/9/2021 5:20 AM   Workstation Name: [facility]  [facility]    XR Abdomen KUB    Result Date: 8/8/2021  Dobbhoff tube tip in the mid stomach. Signer Name: Truong Gustafson MD  Signed: 8/8/2021 9:01 AM  Workstation Name: [id number]  [facility]      I personally reviewed patient's x-ray films and my findings are: Bibasilar infiltrate.  Hyperinflated chest.  Interstitial lung disease    I personally reviewed patient's EKG strip and my findings are:     Medication Review:   I have reviewed the patient's current medication list 08/12/21     Scheduled Meds  budesonide, 0.5 mg, Nebulization, BID - RT  cefepime, 2 g, Intravenous, Q8H  dilTIAZem CD, 240 mg, Oral, Q24H  enoxaparin, 40 mg, Subcutaneous, Nightly  ipratropium-albuterol, 3 mL, Nebulization, Q6H - RT  lisinopril, 20 mg, Oral, Q24H  metroNIDAZOLE, 500 mg, Intravenous, Q8H  nicotine, 1 patch, Transdermal, Q24H  QUEtiapine, 100 mg, Oral, Nightly  sertraline, 50 mg, Oral, Daily  sodium chloride, 10 mL, Intravenous, Q12H        Meds Infusions  dexmedetomidine, 0.2-1.5 mcg/kg/hr, Last Rate: Stopped (08/10/21 1830)        DVT prophylaxis  Mechanical Order History:     None      Pharmalogical Order History:      Ordered     Dose Route Frequency Stop    08/07/21 1740  enoxaparin (LOVENOX) syringe 40 mg      40 mg SC Nightly --                Meds PRN  clonazePAM  •  fentaNYL citrate (PF)  •  nitroglycerin  •  sodium chloride  •  sodium chloride      Byron Vargas MD  08/12/21  15:07 EDT

## 2021-08-12 NOTE — PROGRESS NOTES
"  Patient Identification:  NAME:  Flores Marie  Age:  56 y.o.   Sex:  female   :  1965   MRN:  [id number]       Chief complaint: Right-sided weakness, bowel incontinence    History of present illness: Patient is stating she feels \"numb all over\" she had bowel incontinence earlier today according to her nurse she is a bit more alert today.  Recall she is weak on the right side but had an MRI scan previously another facility that did not show changes consistent with stroke      Past medical history:  Past Medical History:   Diagnosis Date   • Arthritis    • Asthma    • COPD (chronic obstructive pulmonary disease) (CMS/[facility])    • Elevated cholesterol    • Hypertension    • Stroke (CMS/[facility])        Allergies:  Contrast dye, Gabapentin, Naproxen, Nsaids, Atorvastatin, Cyclobenzaprine, and Iodinated diagnostic agents    Home medications:  Medications Prior to Admission   Medication Sig Dispense Refill Last Dose   • atorvastatin (LIPITOR) 40 MG tablet Take 40 mg by mouth Daily.   2021 at Unknown time   • baclofen (LIORESAL) 10 MG tablet Take 10 mg by mouth 3 (Three) Times a Day.   2021 at Unknown time   • fluticasone (FLONASE) 50 MCG/ACT nasal spray 2 sprays into the nostril(s) as directed by provider Daily.   2021 at Unknown time   • lisinopril (PRINIVIL,ZESTRIL) 20 MG tablet Take 20 mg by mouth Daily.   2021 at Unknown time   • metoprolol tartrate (LOPRESSOR) 50 MG tablet Take 50 mg by mouth 2 (Two) Times a Day.   2021 at Unknown time   • pantoprazole (PROTONIX) 40 MG EC tablet Take 40 mg by mouth Daily.   2021 at Unknown time   • QUEtiapine (SEROquel) 100 MG tablet Take 100 mg by mouth Every Night.   2021 at Unknown time        Hospital medications:  budesonide, 0.5 mg, Nebulization, BID - RT  cefepime, 2 g, Intravenous, Q8H  dilTIAZem CD, 240 mg, Oral, Q24H  enoxaparin, 40 mg, Subcutaneous, Nightly  ipratropium-albuterol, 3 mL, Nebulization, Q6H - RT  lisinopril, 20 mg, Oral, " Q24H  metroNIDAZOLE, 500 mg, Intravenous, Q8H  nicotine, 1 patch, Transdermal, Q24H  QUEtiapine, 100 mg, Oral, Nightly  sertraline, 50 mg, Oral, Daily  sodium chloride, 10 mL, Intravenous, Q12H      dexmedetomidine, 0.2-1.5 mcg/kg/hr, Last Rate: Stopped (08/10/21 1830)      clonazePAM  •  fentaNYL citrate (PF)  •  LORazepam  •  nitroglycerin  •  sodium chloride  •  sodium chloride      Objective:  Vitals Ranges:   Temp:  [97.5 °F (36.4 °C)-98.4 °F (36.9 °C)] 97.7 °F (36.5 °C)  Heart Rate:  [] 115  Resp:  [20-30] 20  BP: (105-172)/() 119/80      Physical Exam:  A little bit more alert than yesterday but still looks drunken, knows where she is normal cranial nerves II through VII right upper extremity weaker than the left with a drift she moves both lower extremities reflexes trace toes are downgoing bilaterally    Results review:   I reviewed the patient's new clinical results.    Data review:  Lab Results (last 24 hours)     Procedure Component Value Units Date/Time    Blood Culture - Blood, Arm, Left [[id number]] Collected: 08/07/21 2243    Specimen: Blood from Arm, Left Updated: 08/11/21 2315     Blood Culture No growth at 4 days    Blood Culture - Blood, Arm, Left [[id number]] Collected: 08/07/21 2054    Specimen: Blood from Arm, Left Updated: 08/11/21 2116     Blood Culture No growth at 4 days    Respiratory Culture - Sputum, ET Suction [[id number]] Collected: 08/08/21 1337    Specimen: Sputum from ET Suction Updated: 08/11/21 1631     Respiratory Culture Scant growth (1+) Normal Respiratory Ena     Gram Stain Moderate (3+) WBCs seen      No epithelial cells seen      Rare (1+) Normal respiratory ena           Imaging:  Imaging Results (Last 24 Hours)     ** No results found for the last 24 hours. **         PPE worn at all times washed before washed afterwards disposed of everything properly was now within 6 feet of her for more than few minutes during my exam no aerosols used at any  point    Assessment and Plan:       Community acquired pneumonia of right lower lobe of lung    Acute on chronic respiratory failure with hypoxia and hypercapnia (CMS/HCC)    Essential hypertension    Chronic bronchitis (CMS/HCC)    Current smoker    Substance abuse (CMS/HCC)    Interstitial lung disease (CMS/HCC)    Patient definitely has some metabolic encephalopathy she is weaker on the right side and by report has not had a stroke based upon previous MRI scan she does have bowel incontinence and according to the nurse had some bowel incontinence today.  Therefore we are going to go ahead and check the MRI of the brain as well as the cervical spine.  Otherwise there is not also a lot I have to offer this patient other than to ensure she has not had an acute or chronic stroke or to make sure that she does not have a significant cervical myelopathy also could cause right arm weakness and also be associated with bowel incontinence  Lastly this patient is a polysubstance drug abuser and looks 20 years older than her stated age.  I cannot absolutely rule out that her current exam is all due to previous brain or spinal cord injury.  She does not look like a brand-new stroke or a brand-new myelopathy.      Jean Carlos Soni MD  08/12/21  10:31 EDT

## 2021-08-13 PROBLEM — E43 SEVERE MALNUTRITION (HCC): Status: ACTIVE | Noted: 2021-01-01

## 2021-08-13 NOTE — PROGRESS NOTES
"Adult Nutrition  Assessment/PES    Patient Name:  Flores Marie  YOB: 1965  MRN: [id number]  Admit Date:  8/7/2021    Assessment Date:  8/13/2021    Comments:  Pt meets criteria for severe malnutrition based on nutrition exam with muscle wasting, fat loss, poor intake and wt loss reported.    Will send CIB shakes with whole milk and ice cream to supplement po. Pt denies snacks. Informed pt of what is available on unit upon request.  Will cont to follow and monitor.     Reason for Assessment     Row Name 08/13/21 1142          Reason for Assessment    Reason For Assessment  follow-up protocol     Diagnosis  pulmonary disease         Nutrition/Diet History     Row Name 08/13/21 1142          Nutrition/Diet History    Typical Food/Fluid Intake  Spoke w pt in room. Reports drinking well , eating some. Pt likes pinky milk and agreeable to CIB shake pinky. NKFA. Denies issue chew/swallowing. Reports [facility]BW 1 yr ago 180#, \"they didn't have good food there\" somewhere she had been. Pt agreeable to nutrition exam.             Physical Findings     Row Name 08/13/21 1143          Physical Findings    Overall Physical Appearance  loss of muscle mass;loss of subcutaneous fat                 Malnutrition Severity Assessment     Row Name 08/13/21 1143          Malnutrition Severity Assessment    Malnutrition Type  Chronic Disease - Related Malnutrition        Insufficient Energy Intake     Insufficient Energy Intake   <50% of est. energy requirement for >or equal to 1 month        Unintentional Weight Loss     Unintentional Weight Loss   -- wt loss 19.4% in 1 yr reported        Muscle Loss    [place] Region  Moderate - slight depression     Clavicle Bone Region  Moderate - some protrusion in females, visible in males     Acromion Bone Region  Moderate - acromion may slightly protrude     Dorsal Hand Region  Severe - prominent depression     Patellar Region  Moderate - patella more prominent, less muscle definition " around patella     Posterior Calf Region  Severe - thin with very little definition/firmness        Fat Loss    Orbital Region   Moderate -  somewhat hollowness, slightly dark circles     Upper Arm Region  Severe - mostly skin, very little space between folds, fingers touch        Criteria Met (Must meet criteria for severity in at least 2 of these categories: M Wasting, Fat Loss, Fluid, Secondary Signs, Wt. Status, Intake)    Patient meets criteria for   Severe Malnutrition           Problem/Interventions:    Problem 2     Row Name 08/13/21 1144          Nutrition Diagnoses Problem 2    Problem 2  Malnutrition     Etiology (related to)  Factors Affecting Nutrition;Medical Diagnosis     Signs/Symptoms (evidenced by)  Report/Observation;Unintended Weight Change MSA     Unintended Weight Change  Loss     Number of Pounds Lost  35#     Weight loss time period  1 yr                   Education/Evaluation     Row Name 08/13/21 1145          Education    Education  Other (comment) encouraged pro each meal, try CIB shake, and eat small amounts even if not hungry.        Monitor/Evaluation    Monitor  Per protocol;I&O;PO intake;Supplement intake;Pertinent labs;Weight;Symptoms     Education Follow-up  Other (comment) pt verbalized understanding           Electronically signed by:  Kristin Canas RD  08/13/21 11:46 EDT

## 2021-08-13 NOTE — PLAN OF CARE
Goal Outcome Evaluation:           Progress: no change  Outcome Summary: IV abx cont, medsurg overflow pt, plan to be transfered to  [facility] when bed is available, no complaints of pain, resting well throughout the night, will continue to monitor

## 2021-08-13 NOTE — PROGRESS NOTES
I have reviewed the MRI scan of the brain and it shows chronic changes only nothing acute  As expected the MRI of the cervical spine does show significant spondylosis at multiple levels and plans are to transfer the patient to [facility] for further management  Jean Carlos Soni MD.

## 2021-08-13 NOTE — PROGRESS NOTES
[facility] INPATIENT PROGRESS NOTE         [facility] ICU    2021      PATIENT IDENTIFICATION:  Name: Flores Marie ADMIT: 2021   : 1965  PCP: Sayda Burnett MD    MRN: [id number] LOS: 6 days   AGE/SEX: 56 y.o. female  ROOM: ICU                     LOS 6    Reason for visit: Confusion, encephalopathy       SUBJECTIVE:    Significant myelopathy on MRI, pt is awaiting transfer to [facility] for evaluation by NSG. Still significant anxiety.   Review of Systems   Constitutional: Negative for chills and fever.   HENT: Negative for congestion and trouble swallowing.    Respiratory: Positive for shortness of breath. Negative for wheezing and stridor.    Cardiovascular: Negative for chest pain and leg swelling.   Gastrointestinal: Negative for diarrhea, nausea and vomiting.   Neurological: Positive for weakness. Negative for confusion.         Objective   OBJECTIVE:    Vital Sign Min/Max for last 24 hours  Temp  Min: 97.4 °F (36.3 °C)  Max: 98 °F (36.7 °C)   BP  Min: 92/60  Max: 134/93   Pulse  Min: 86  Max: 118   Resp  Min: 18  Max: 24   SpO2  Min: 92 %  Max: 96 %   No data recorded   No data recorded                   FiO2 (%): 29 %     Body mass index is 24.15 kg/m².    Intake/Output Summary (Last 24 hours) at 2021 0607  Last data filed at 2021 0400  Gross per 24 hour   Intake 1190 ml   Output 475 ml   Net 715 ml         Exam:  GEN:  No distress this morning but on precedex, appears stated age  EYES:   PERRL, anicteric sclerae  ENT:    External ears/nose normal, OP clear  NECK:  No adenopathy, midline trachea  LUNGS: Prolonged expiratory phase, no wheezing, no rales    CV:  Normal S1S2, without murmur  ABD:  Nontender, nodistended,   EXT:  No edema.  No cyanosis or clubbing.       Scheduled meds:  budesonide, 0.5 mg, Nebulization, BID - RT  cefepime, 2 g, Intravenous, Q8H  dilTIAZem CD, 240 mg, Oral, Q24H  enoxaparin, 40 mg, Subcutaneous,  Nightly  ipratropium-albuterol, 3 mL, Nebulization, Q6H - RT  lisinopril, 20 mg, Oral, Q24H  metroNIDAZOLE, 500 mg, Intravenous, Q8H  nicotine, 1 patch, Transdermal, Q24H  QUEtiapine, 100 mg, Oral, Nightly  sertraline, 50 mg, Oral, Daily  sodium chloride, 10 mL, Intravenous, Q12H      IV meds:                      dexmedetomidine, 0.2-1.5 mcg/kg/hr, Last Rate: Stopped (08/10/21 1830)      Data Review:  Results from last 7 days   Lab Units 08/11/21  0344 08/10/21  0424 08/10/21  0424 08/09/21  0419 08/09/21  0419 08/08/21  0427 08/08/21  0427 08/07/21  2052 08/07/21  2052   SODIUM mmol/L 137  --  135*  --  138  --  135*  --  136   POTASSIUM mmol/L 3.8  --  4.3  --  4.7  --  5.1  --  4.3   CHLORIDE mmol/L 94*  --  94*  --  99  --  98  --  100   CO2 mmol/L 38.9*  --  40.3*  --  34.8*  --  33.0*  --  31.8*   BUN mg/dL 7  --  10  --  13  --  20  --  23*   CREATININE mg/dL 0.42*  --  0.40*  --  0.52*  --  0.62  --  0.71   GLUCOSE mg/dL 106*   < > 133*   < > 150*   < > 99   < > 100*   CALCIUM mg/dL 8.4*  --  8.2*  --  8.4*  --  8.6  --  7.7*    < > = values in this interval not displayed.         Estimated Creatinine Clearance: 157.2 mL/min (A) (by C-G formula based on SCr of 0.42 mg/dL (L)).  Results from last 7 days   Lab Units 08/11/21 0344 08/09/21 0419 08/08/21 0427 08/07/21 2053   WBC 10*3/mm3 9.27 13.40* 12.16* 11.81*   HEMOGLOBIN g/dL 13.3 13.0 14.6 13.7   PLATELETS 10*3/mm3 223 203 222 195         Results from last 7 days   Lab Units 08/11/21  0344 08/09/21  0419   ALT (SGPT) U/L 20 13   AST (SGOT) U/L 41* 20     Results from last 7 days   Lab Units 08/10/21  0450 08/09/21  0800 08/08/21  0545 08/07/21  2110   PH, ARTERIAL pH units 7.376 7.243* 7.287* 7.366   PO2 ART mm Hg 71.6* 77.2* 71.9* 78.4*   PCO2, ARTERIAL mm Hg 75.2* 94.0* 74.4* 59.2*   HCO3 ART mmol/L 44.0* 40.5* 35.5* 33.9*     Results from last 7 days   Lab Units 08/10/21  0424 08/09/21  0419   PROCALCITONIN ng/mL 0.02 0.03         No results found  for: HGBA1C, POCGLU            Active Hospital Problems    Diagnosis  POA   • **Community acquired pneumonia of right lower lobe of lung [J18.9]  Yes   • Cervical myelopathy (CMS/[facility]) [G95.9]  Yes   • Essential hypertension [I10]  Yes   • Chronic bronchitis (CMS/[facility]) [J42]  Yes   • Current smoker [F17.200]  Yes   • Substance abuse (CMS/[facility]) [F19.10]  Clinically Undetermined   • Interstitial lung disease (CMS/[facility]) [J84.9]  Yes   • Acute on chronic respiratory failure with hypoxia and hypercapnia (CMS/[facility]) [J96.21, J96.22]  Yes      Resolved Hospital Problems   No resolved problems to display.         ASSESSMENT:  Acute encephalopathy (resolving)  Acute respiratory failure  Chronic Hypercapnia   Pneumonia, RVP neg, MRSA screen neg, PCT neg   - completed 5 day course of abx for LLL infiltrate   History of illicit drug abuse  COPD  Smoker     Recommendations:   - would switch to symbicort and spiriva, if she can use the inhalers, she did not have signs of a COPDE. Will need to wait for neurological evaluation if hands are too weak. continue Duonebs if she cannot, may need LAMA LABA nebs on d/c if she is unable      - again refuses to ever wear NIV masks, although she would likely benefit   - anxiety per primary  - wean oxygen as tolerates, goal o2 sats 88-94%, she is a chronic CO2 retainer        Efe Rubi MD  Pulmonary and Critical Care Medicine  [facility]  8/13/2021  06:07 EDT

## 2021-08-13 NOTE — PLAN OF CARE
Goal Outcome Evaluation:  Plan of Care Reviewed With: patient        Progress: no change  Outcome Summary: Pt moved to [facility]. IV antibiotics continued. Pt reports no pain at this time. Pt VSS at this time.

## 2021-08-13 NOTE — PROGRESS NOTES
Patient Name: Flores Marie  :1965  56 y.o.      Daily Progress Note    Date of Hospital Visit: 2021  7:45 PM  Encounter Provider: Jhon Naylor MD  Place of Service: [facility] Hospitalist group.  Patient Care Team:  Sayda Burnett MD as PCP - General      Chief complaint: No chief complaint on file.  Shortness of breath.  History of Present Illness:  Ms. Marie is a 56-year-old female who has been critically ill for quite some time here.  Recently extubated due to respiratory failure.  She was transferred from the ICU to the regular [facility] floor today.  She is awake but not completely alert.  Family is at bedside.  She is hungry and eating possible and boost.  She is still quite short of breath with lots of productive sputum.  She is still on IV antibiotics  Her right arm and right leg are also still quite weak.  She has longstanding known advanced cervical spine disease.  MRI of neck cervical spine was done which revealed advanced disc disease.  MRI was also done of her brain due to continued confusion, MRI brain reveals nothing acute.  So her diagnosis of acute metabolic encephalopathy from sepsis still stands.      Past Medical History:   Diagnosis Date   • Arthritis    • Asthma    • COPD (chronic obstructive pulmonary disease) (CMS/HCC)    • Elevated cholesterol    • Hypertension    • Stroke (CMS/HCC)        Allergies   Allergen Reactions   • Contrast Dye Hives, Nausea And Vomiting and Swelling   • Gabapentin Swelling   • Naproxen Shortness Of Breath   • Nsaids Swelling   • Atorvastatin Swelling     Patient states she was given atorvastatin in the hospital and she had throat irritation and swelling  Patient states she was given atorvastatin in the hospital and she had throat irritation and swelling     • Cyclobenzaprine Unknown - Low Severity   • Iodinated Diagnostic Agents Rash     REVIEW OF SYSTEMS:   All other  systems reviewed and negative.        Objective:     Vitals:    08/13/21 0717 08/13/21 1309 08/13/21 1318 08/13/21 1627   BP:    165/75   BP Location:    Left arm   Patient Position:       Pulse: 102 96 101    Resp: 16  28    Temp:    97.4 °F (36.3 °C)   TempSrc:    Axillary   SpO2: 96% 94%     Weight:       Height:           Constitutional: Patient is awake in hospital bed or chair. Conversant.  Appears well-developed and not toxic.   HEENT:   Head: Normocephalic and atraumatic. Head is without contusion.   Ears: Hearing grossly normal to conversational voice. No drainage.   Nose: No nasal deformity. No epistaxis.   Throat: normal.  Eyes: Lids are normal. Pupils are equal, round, and reactive to light.  Eyes exhibits no exudate.  No conjunctiva hemorrhage.   Neck: No JVD present. Carotid bruit is not present. No tracheal deviation. No thyroid mass nor thyromegaly.   Cardiovascular: Normal rate, regular rhythm and no murmur.    Pulses:present in radial and DP  Pulmonary/Chest: Rhonchi throughout  Abdominal: Soft. Normal appearance. Bowel sounds are normal. There is no tenderness.   Musculoskeletal: Normal range of motion.   Neurological: Patient is oriented to person, place, and time. Adequate strength in 4 extremities.   Skin: Skin is warm, dry and intact. No rash noted.   Psychiatric:Normal mood and affect. Behavior is normal. Thought content normal.         Current Facility-Administered Medications:   •  cefepime (MAXIPIME) IVPB 2 g/50ml D5W (premix), 2 g, Intravenous, Q8H, Tra Diaz MD, Last Rate: 0 mL/hr at 08/13/21 0400, 2 g at 08/13/21 1624  •  clonazePAM (KlonoPIN) tablet 0.5 mg, 0.5 mg, Oral, BID PRN, Byron Vargas MD, 0.5 mg at 08/13/21 0833  •  dilTIAZem CD (CARDIZEM CD) 24 hr capsule 240 mg, 240 mg, Oral, Q24H, Byron Vargas MD, 240 mg at 08/13/21 0827  •  enoxaparin (LOVENOX) syringe 40 mg, 40 mg, Subcutaneous, Nightly, Jhon Mcgill DO, 40 mg at 08/12/21 2055  •  ipratropium-albuterol  (DUO-NEB) nebulizer solution 3 mL, 3 mL, Nebulization, Q6H - RT, Tra Diaz MD, 3 mL at 08/13/21 1309  •  lisinopril (PRINIVIL,ZESTRIL) tablet 20 mg, 20 mg, Oral, Q24H, Byron Vargas MD, 20 mg at 08/13/21 0827  •  metroNIDAZOLE (FLAGYL) 500 mg/100mL IVPB, 500 mg, Intravenous, Q8H, Tra Diaz MD, Stopped at 08/13/21 1751  •  nicotine (NICODERM CQ) 21 MG/24HR patch 1 patch, 1 patch, Transdermal, Q24H, Byron Vargas MD, 1 patch at 08/13/21 0827  •  QUEtiapine (SEROquel) tablet 100 mg, 100 mg, Oral, Nightly, Byron Vargas MD, 100 mg at 08/12/21 2055  •  sertraline (ZOLOFT) tablet 50 mg, 50 mg, Oral, Daily, Byron Vargas MD, 50 mg at 08/13/21 0827  •  sodium chloride 0.9 % flush 10 mL, 10 mL, Intravenous, PRN, Jhon Mcgill DO  •  sodium chloride 0.9 % flush 10 mL, 10 mL, Intravenous, Q12H, Jhon Mcgill DO, 10 mL at 08/13/21 0828  •  sodium chloride 0.9 % infusion 40 mL, 40 mL, Intravenous, PRN, Jhon Mcgill DO    Lab Review:   Lab Results   Component Value Date    WBC 9.27 08/11/2021    HGB 13.3 08/11/2021    HCT 41.9 08/11/2021    MCV 99.8 (H) 08/11/2021     08/11/2021     Blood Culture   Date Value Ref Range Status   08/07/2021 No growth at 5 days  Final   08/07/2021 No growth at 5 days  Final     Lab Results   Component Value Date    TROPONINI <0.010 07/20/2021     No results found for: URINECX  Lab Results   Component Value Date    GLUCOSE 106 (H) 08/11/2021    BUN 7 08/11/2021    CREATININE 0.42 (L) 08/11/2021    EGFRIFNONA >150 08/11/2021    BCR 16.7 08/11/2021    CO2 38.9 (H) 08/11/2021    CALCIUM 8.4 (L) 08/11/2021    ALBUMIN 2.80 (L) 08/11/2021    AST 41 (H) 08/11/2021    ALT 20 08/11/2021       I personally viewed the patient's EKG/Telemetry data.      MRI Cervical Spine Without Contrast (08/12/2021 12:35)       Assessment and Plan:     1.  Improving pneumonia with respiratory failure.  Recent ventilator extubation.  Still requiring oxygen and duo nebs and IV antibiotics.    2.   Improving metabolic encephalopathy    3.  Advanced cervical spine disease with right arm and right leg weakness.  Neurosurgery consultation to be done at [facility].  Possible transfer when bed is available    4.  Advance diet as tolerated.  We will remove her Townsend catheter.  Place an external urinary catheter.          Jhon Naylor MD  08/13/21  18:24 EDT

## 2021-08-14 NOTE — PLAN OF CARE
Goal Outcome Evaluation:  Plan of Care Reviewed With: patient        Progress: no change  Outcome Summary: VSS, awaiting transfer to  [facility], IV abx cont, purewick in use due to bedrest, 2L NC, will continue to monitor

## 2021-08-14 NOTE — CASE MANAGEMENT/SOCIAL WORK
Continued Stay Note  [place]     Patient Name: Flores Marei  MRN: [id number]  Today's Date: 8/14/2021    Admit Date: 8/7/2021    Discharge Plan     Row Name 08/14/21 1615       Plan                                        Care and comfort measures only    Plan Comments  Rapid response was called on patient this morning for decreased LOC. She was transferred back to the ICU where family has decided they did not want vent or BIPAP and would like care and comfort measures only. CM will continue to follow.        Discharge Codes    No documentation.             Nina Avitia RN

## 2021-08-14 NOTE — PROGRESS NOTES
"  PROGRESS NOTE  Patient Name: Flores Marie  Age/Sex: 56 y.o. female  : 1965  MRN: [id number]    Date of Admission: 2021  Date of Encounter Visit: 21   LOS: 7 days   Patient Care Team:  Sayda Burnett MD as PCP - General    Chief Complaint: Acute on chronic hypercapnic respiratory failure    Hospital course: Patient was transferred out of the ICU after she was liberated from the ventilator, and yesterday she was sitting in the bed and able to communicate however this morning she had significant changes with altered mental status, she was obtunded, she was having agonal breathing with minimal air movement.  Had a stat ABG and rapid response was called, the patient had a PCO2 of 111 with a pH of 7.2.  She was transferred to the ICU for further management and for intubation.      REVIEW OF SYSTEMS:   Unable to get a system review given her current obtunded mental status    Ventilator/Non-Invasive Ventilation Settings (From admission, onward)         Vital Signs  Temp:  [97.4 °F (36.3 °C)-98.4 °F (36.9 °C)] 98.3 °F (36.8 °C)  Heart Rate:  [] 104  Resp:  [18-28] 20  BP: (124-165)/(75-91) 124/77  SpO2:  [92 %-98 %] 92 %  on  Flow (L/min):  [1-2] 1 Device (Oxygen Therapy): nasal cannula    Intake/Output Summary (Last 24 hours) at 2021 0913  Last data filed at 2021 1625  Gross per 24 hour   Intake 100 ml   Output 100 ml   Net 0 ml     Flowsheet Rows      First Filed Value   Admission Height  165.1 cm (65\") Documented at 2021   Admission Weight  65.1 kg (143 lb 9.6 oz) Documented at 2021        Body mass index is 24.15 kg/m².      08/10/21  0732 21  0355 21  1447   Weight: 67.8 kg (149 lb 8 oz) 65.8 kg (145 lb 1.6 oz) (145.1#)       Physical Exam:  GEN: Sickly looking lady, unresponsive except to noxious stimulation but nonverbal and does not follow any commands  EYES:   Sclerae clear. No icterus. PERRL. Normal EOM  ENT:   External ears/nose " normal, no oral lesions, no thrush, mucous membranes moist  NECK:  Supple, midline trachea, no JVD  LUNGS: Normal chest on inspection, patient is having agonal breathing, shallow, with very diminished breath sounds even while trying to Ambu bag the breathing was labored and agonal.   CV:  Regular rhythm and borderline tachycardia. Normal S1/S2. No murmurs, gallops, or rubs noted.  ABD:  Soft, nontender and nondistended. Normal bowel sounds. No guarding  EXT: Minimal spontaneous movement. No cyanosis. No redness. No edema.   Skin: Dry, intact, no bleeding    Results Review:        Results from last 7 days   Lab Units 08/11/21  0344 08/10/21  0424 08/09/21  0419 08/08/21  0427 08/07/21  2052   SODIUM mmol/L 137 135* 138 135* 136   POTASSIUM mmol/L 3.8 4.3 4.7 5.1 4.3   CHLORIDE mmol/L 94* 94* 99 98 100   CO2 mmol/L 38.9* 40.3* 34.8* 33.0* 31.8*   BUN mg/dL 7 10 13 20 23*   CREATININE mg/dL 0.42* 0.40* 0.52* 0.62 0.71   CALCIUM mg/dL 8.4* 8.2* 8.4* 8.6 7.7*   AST (SGOT) U/L 41*  --  20  --   --    ALT (SGPT) U/L 20  --  13  --   --    ANION GAP mmol/L 4.1* 0.7* 4.2* 4.0* 4.2*   ALBUMIN g/dL 2.80* 2.40* 2.60*  --   --          Results from last 7 days   Lab Units 08/07/21 2052   TSH uIU/mL 0.278     Results from last 7 days   Lab Units 08/09/21 0419   PROBNP pg/mL 228.2     Results from last 7 days   Lab Units 08/11/21 0344 08/09/21 0419 08/08/21 0427 08/07/21 2053   WBC 10*3/mm3 9.27 13.40* 12.16* 11.81*   HEMOGLOBIN g/dL 13.3 13.0 14.6 13.7   HEMATOCRIT % 41.9 42.9 47.2* 43.4   PLATELETS 10*3/mm3 223 203 222 195   MCV fL 99.8* 105.1* 101.1* 100.9*   NEUTROPHIL % % 80.6* 86.3* 83.8* 83.8*   LYMPHOCYTE % % 10.4* 7.3* 9.5* 8.1*   MONOCYTES % % 7.7 5.7 6.0 7.6   EOSINOPHIL % % 0.3 0.0* 0.0* 0.0*   BASOPHIL % % 0.2 0.1 0.1 0.2   IMM GRAN % % 0.8* 0.6* 0.6* 0.3         Results from last 7 days   Lab Units 08/09/21  0419 08/08/21  0427 08/07/21 2052   MAGNESIUM mg/dL 2.0 2.0 1.9           Invalid input(s):  LDLCALC  Results from last 7 days   Lab Units 08/10/21  0450 08/09/21  0800 08/08/21  0545 08/07/21  2110   PH, ARTERIAL pH units 7.376 7.243* 7.287* 7.366   PCO2, ARTERIAL mm Hg 75.2* 94.0* 74.4* 59.2*   PO2 ART mm Hg 71.6* 77.2* 71.9* 78.4*   HCO3 ART mmol/L 44.0* 40.5* 35.5* 33.9*     Results from last 7 days   Lab Units 08/07/21 2052   HEMOGLOBIN A1C % 5.70*     No results found for: POCGLU  Results from last 7 days   Lab Units 08/10/21  0424 08/09/21  0419   PROCALCITONIN ng/mL 0.02 0.03     Results from last 7 days   Lab Units 08/08/21  1337 08/08/21  1316 08/08/21  0438 08/08/21  0150 08/07/21  2243 08/07/21 2054   BLOODCX   --   --   --   --  No growth at 5 days No growth at 5 days   RESPCX  Scant growth (1+) Normal Respiratory Elke  --  Scant growth (1+) Normal Respiratory Elke: NO S.aureus/MRSA or Pseudomonas aeruginosa  --   --   --    MRSACX   --   --   --  No Methicillin Resistant Staphylococcus aureus isolated  --   --    STREP PNEUMO AG   --  Negative  --   --   --   --    L. PNEUMOPHILA SEROGP 1 UR AG   --  Negative  --   --   --   --          Results from last 7 days   Lab Units 08/08/21 1952   ADENOVIRUS DETECTION BY PCR  Not Detected   CORONAVIRUS 229E  Not Detected   CORONAVIRUS HKU1  Not Detected   CORONAVIRUS NL63  Not Detected   CORONAVIRUS OC43  Not Detected   HUMAN METAPNEUMOVIRUS  Not Detected   HUMAN RHINOVIRUS/ENTEROVIRUS  Not Detected   INFLUENZA B PCR  Not Detected   PARAINFLUENZA 1  Not Detected   PARAINFLUENZA VIRUS 2  Not Detected   PARAINFLUENZA VIRUS 3  Not Detected   PARAINFLUENZA VIRUS 4  Not Detected   BORDETELLA PERTUSSIS PCR  Not Detected   BORDETELLA PARAPERTUSSIS PCR  Not Detected   CHLAMYDOPHILA PNEUMONIAE PCR  Not Detected   MYCOPLAMA PNEUMO PCR  Not Detected   RSV, PCR  Not Detected               Imaging:   Imaging Results (All)     Procedure Component Value Units Date/Time          I reviewed the patient's new clinical results.  I personally viewed and interpreted  the patient's imaging results: The right lower lobe infiltrate did improve, patient had some homogeneous left-sided haziness which could represent infiltrate with possible effusion that did regress on today's film.        Medication Review:   cefepime, 2 g, Intravenous, Q8H  dilTIAZem CD, 240 mg, Oral, Q24H  enoxaparin, 40 mg, Subcutaneous, Nightly  ipratropium-albuterol, 3 mL, Nebulization, Q6H - RT  lisinopril, 20 mg, Oral, Q24H  metroNIDAZOLE, 500 mg, Intravenous, Q8H  nicotine, 1 patch, Transdermal, Q24H  QUEtiapine, 100 mg, Oral, Nightly  sertraline, 50 mg, Oral, Daily  sodium chloride, 10 mL, Intravenous, Q12H             ASSESSMENT:   1. Worsening altered mental status, CO2 narcosis  2. Acute on chronic hypercapnic respiratory failure, severe  3. Bilateral lower lobe pneumonia  4. Chronic hypercapnic respiratory failure  5. History of illicit drug abuse  6. COPD  7. Tobacco abuse    PLAN:  Patient has significant altered mental status today that is a lot worse than what was reported by other providers on yesterday's assessment where the patient was able to be in a chair and was able to carry on with a conversation which is definitely no longer the case.  We will go ahead and get a stat ABGs and consider further measures accordingly, will need to transfer back to the intensive care unit  If there is no obvious respiratory explanation or hypercapnia, need to further evaluate for any further neurological decline since the patient is already some encephalopathy and some peripheral weakness that was evaluated with an MRI of the head that was negative except for significant spondylosis on the MRI of the cervical spine.  The chest x-ray done today does not show any worsening pneumonia whether there is an improvement.  Discussed with Dr. ERWIN    Addendum:  ABG showed pH of 7.2 with a PCO2 of 111 consistent with acute on chronic hypercapnic respiratory failure  She did drop her saturation in the 80s and did come  back with ambo bagging.  Patient was transferred to the intensive care unit and intubation was prepared, she had a DO NOT RESUSCITATE CODE STATUS and while preparing for the intubation the daughter was contacted by the primary team and we were notified that they do not wish to intubate or even use the BiPAP and patient will be managed with palliative care setting.    Disposition: Patient is critically ill and is DO NOT RESUSCITATE and will likely     Scout Mohan MD  21  09:13 EDT      Time: Critical care 36 min      Dictated utilizing Dragon dictation

## 2021-08-14 NOTE — PROGRESS NOTES
"Hospitalist Team      Patient Care Team:  Sayda Burnett MD as PCP - General        Chief Complaint:  Acute on chronic hypoxic and hypercapnic respiratory failure    Subjective    Interval History and ROS:     Patient was found somnolent today in her room.  A rapid response was called.  ABG was done and patient was found to be hypercapnic.  She had Apneustic respirations.  Her respirations were assisted and patient was moved to ICU.  She slowly improved and was moving her arms and opening her eyes.  I called her daughter and she told us she was a DNR with everything else ok to be done.  She gave us permission to put her on NIPPV.  The daughter came in and we reviewed the DNR orders and she wanted everything in it not done except bi-pap.  I was called again later in the day and the family was present.  They requested her to be a Care and comfort only and for us to remove the Bi-Pap or NIPPV.  This was done. They also requested medications for her comfort and small doses of ativan for agitation and morphine for pain and 1 dose of robinul were ordered.  Family is staying in the room with her now with nursing care and comfort      Objective    Vital Signs  Temp:  [98.3 °F (36.8 °C)-98.4 °F (36.9 °C)] 98.3 °F (36.8 °C)  Heart Rate:  [102-121] 112  Resp:  [18-20] 20  BP: (124-146)/(72-91) 133/72  Oxygen Therapy  SpO2: 94 %  Pulse Oximetry Type: Continuous  Device (Oxygen Therapy): other (see comments) (Bipap)  Flow (L/min): 1  Oxygen Concentration (%): 35  ETCO2 (mmHg): 59 mmHg}  Flowsheet Rows      First Filed Value   Admission Height  165.1 cm (65\") Documented at 08/07/2021 2000   Admission Weight  65.1 kg (143 lb 9.6 oz) Documented at 08/07/2021 2000          Body mass index is 24.15 kg/m².      Physical Exam:    Physical Exam  Vitals and nursing note reviewed.   Constitutional:       Comments: Patient was found by Dr. Marques arrington.  Rapid response called.  ABG revealed CO2 of 111 which was the cause of " the issues.  Patient's respirations were assisted and patient was moved to ICU.   Cardiovascular:      Rate and Rhythm: Regular rhythm. Tachycardia present.   Pulmonary:      Comments: Very shallow breath sounds   Skin:     General: Skin is warm and dry.   Neurological:      Comments: somnolent         Results Review:     I reviewed the patient's new clinical results.    Lab Results (last 24 hours)     Procedure Component Value Units Date/Time    POC Glucose Once [[id number]]  (Abnormal) Collected: 08/14/21 0933    Specimen: Blood Updated: 08/14/21 0939     Glucose 139 mg/dL      Comment: Meter: [id number] : [id number] [facility] Corrine PAINTING       Blood Gas, Arterial - [[id number]]  (Abnormal) Collected: 08/14/21 0930    Specimen: Arterial Blood Updated: 08/14/21 0939     Site Left Radial     Kris's Test Positive     pH, Arterial 7.222 pH units      Comment: 84 Value below reference range        pCO2, Arterial >102.0 mm Hg      Comment: 86 Value above critical limit        pO2, Arterial 49.7 mm Hg      Comment: 85 Value below critical limit        HCO3, Arterial 45.7 mmol/L      Comment: 83 Value above reference range        Base Excess, Arterial 12.7 mmol/L      Comment: 83 Value above reference range        O2 Saturation, Arterial 83.3 %      Comment: 84 Value below reference range        Hemoglobin, Blood Gas 14.4 g/dL      Temperature 37.0 C      Barometric Pressure for Blood Gas 743 mmHg      Modality Nasal Cannula     Flow Rate 1.0 lpm      Ventilator Mode NA     Notified Who RB AND V DR ERWIN     Notified By [id number]     Notified Time 08/14/2021 09:45     Collected by [id number]     Comment: Meter: [id number]     :  [id number]        pCO2, Temperature Corrected >102 mm Hg      pH, Temp Corrected 7.222 pH Units      pO2, Temperature Corrected 49.7 mm Hg           Imaging Results (Last 24 Hours)     Procedure Component Value Units Date/Time    XR Chest 1 View [[id number]] Collected: 08/14/21 0652      Updated: 08/14/21 0646    Narrative:      CR Chest 1 Vw    INDICATION:   Pneumonia     COMPARISON:    8/9/2021    FINDINGS:  Single portable AP view(s) of the chest.        Since previous examination, aeration of the right lung base has improved. There is still dense consolidation at the left base, questionably improved. The upper lung fields are unchanged with a faint right upper lobe infiltrate again noted. The left apex  is clear.       Impression:      There is improvement at the right base since the previous examination. The left lung base shows perhaps slight improvement.    Signer Name: Mirza Valdes MD   Signed: 8/14/2021 6:43 AM   Workstation Name: [id number]    [facility]          X-ray not reviewed personally by physician.      ECG not reviewed personally by physician  ECG/EMG Results (most recent)     Procedure Component Value Units Date/Time    ECG 12 Lead [[id number]] Collected: 08/11/21 0204     Updated: 08/11/21 0727     QT Interval 341 ms     Narrative:      HEART RATE= 93  bpm  RR Interval= 644  ms  SD Interval= 144  ms  P Horizontal Axis= -36  deg  P Front Axis= 75  deg  QRSD Interval= 86  ms  QT Interval= 341  ms  QRS Axis= 57  deg  T Wave Axis= 38  deg  - BORDERLINE ECG -  Sinus rhythm  Biatrial enlargement  NO PRIOR TRACING AVAILABLE FOR COMPARISON  Electronically Signed By: Taiwo Hadley ([facility]) 11-Aug-2021 07:26:39  Date and Time of Study: 2021-08-11 02:04:19          Medication Review:   I have reviewed the patient's current medication list    Current Facility-Administered Medications:   •  cefepime (MAXIPIME) IVPB 2 g/50ml D5W (premix), 2 g, Intravenous, Q8H, Tra Diaz MD, Last Rate: 0 mL/hr at 08/13/21 0400, 2 g at 08/14/21 0036  •  dilTIAZem CD (CARDIZEM CD) 24 hr capsule 240 mg, 240 mg, Oral, Q24H, Byron Vargas MD, 240 mg at 08/13/21 0827  •  enoxaparin (LOVENOX) syringe 40 mg, 40 mg, Subcutaneous, Nightly, Jhon Mcgill DO, 40 mg at 08/13/21 2032  •   ipratropium-albuterol (DUO-NEB) nebulizer solution 3 mL, 3 mL, Nebulization, Q6H - RT, Tra Diaz MD, 3 mL at 08/14/21 0728  •  lisinopril (PRINIVIL,ZESTRIL) tablet 20 mg, 20 mg, Oral, Q24H, Byron Vargas MD, 20 mg at 08/13/21 0827  •  LORazepam (ATIVAN) injection 0.25 mg, 0.25 mg, Intravenous, Q2H PRN, Emily Foy DO  •  metroNIDAZOLE (FLAGYL) 500 mg/100mL IVPB, 500 mg, Intravenous, Q8H, Tra Diaz MD, Last Rate: 0 mL/hr at 08/13/21 1751, 500 mg at 08/14/21 0036  •  morphine injection 1 mg, 1 mg, Intravenous, Q2H PRN, Emily Foy DO, 1 mg at 08/14/21 1659  •  nicotine (NICODERM CQ) 21 MG/24HR patch 1 patch, 1 patch, Transdermal, Q24H, Byron Vargas MD, 1 patch at 08/13/21 0827  •  QUEtiapine (SEROquel) tablet 100 mg, 100 mg, Oral, Nightly, Byron Vargas MD, 100 mg at 08/13/21 2032  •  sertraline (ZOLOFT) tablet 50 mg, 50 mg, Oral, Daily, Byron Vargas MD, 50 mg at 08/13/21 0827  •  sodium chloride 0.9 % flush 10 mL, 10 mL, Intravenous, PRN, Jhon Mcgill DO  •  sodium chloride 0.9 % flush 10 mL, 10 mL, Intravenous, Q12H, Jhon Mcgill DO, 10 mL at 08/14/21 1424  •  sodium chloride 0.9 % infusion 40 mL, 40 mL, Intravenous, PRN, Jhon Mcgill DO                Assessment/Plan     Acute hypoxic and hypercapnic respiratory failure with patient becoming a Care and comfort only patient    Plan for disposition:  I very glad that the patient did wake up enough to discuss with her family and tell them she did not want the bipap and wanted to be comfortable.    Emily Foy DO  08/14/21  17:53 EDT      Time: 1 hour (several visits over the day with family and patient

## 2021-08-14 NOTE — NURSING NOTE
Family gathered to bedside. Decision made to make patient care and comfort. Bipap removed by request of the patient and family. Pt is drowsy but is able to communicate wishes. O2 applied via NC for comfort measures. Dr Foy spent time with the patient and family discuss plan of care and answered all questions

## 2021-08-14 NOTE — PLAN OF CARE
Goal Outcome Evaluation:  Plan of Care Reviewed With: patient, family        Progress: declining  Outcome Summary: Pt is now care and comfort. Family at bedside, all care explained.

## 2021-08-15 NOTE — NURSING NOTE
[facility] reported to family via phone that they will call the family back at 0800 to make a decision on whether they will accept the pt. Called  home and left VM. Awaiting a call back.

## 2021-08-15 NOTE — CASE MANAGEMENT/SOCIAL WORK
Case Management Discharge Note      Final Note: Patient .    Provided Post Acute Provider List?: Yes  Post Acute Provider List: Nursing Home, Inpatient Rehab  Provided Post Acute Provider Quality & Resource List?: Yes  Post Acute Provider Quality and Resource List: Inpatient Rehab, Nursing Home  Delivered To: Patient  Method of Delivery: In person    Selected Continued Care - Discharged on 8/15/2021 Admission date: 2021 - Discharge disposition:     Destination Coordination complete.    Service Provider Selected Services Address Phone Fax Patient Preferred    [address] [id number] [phone] --          Durable Medical Equipment    No services have been selected for the patient.              Dialysis/Infusion    No services have been selected for the patient.              Home Medical Care    No services have been selected for the patient.              Therapy    No services have been selected for the patient.              Community Resources    No services have been selected for the patient.              Community & DME    No services have been selected for the patient.                       Final Discharge Disposition Code: 20 -

## 2021-08-15 NOTE — PROGRESS NOTES
At 0417 on 8/15/21 I was called by nursing staff to examine the patient at bedside for unresponsiveness. On exam the patient did not respond to verbal or physical stimuli. I found her to have absent heart and breath sounds.  Absent peripheral pulses. Pupils are fixed and dilated. Patient has been pronounced dead at this time.

## 2021-08-16 NOTE — DISCHARGE SUMMARY
Flores Marie  1965  [id number]    Hospitalists Discharge Summary    Date of Admission: 8/7/2021  Date of Discharge:  8/16/2021  Time of death 0417 8/15/21.     History of Present Illness from [facility] on admit: 57 y/o F with hx of CVA in April-march 2021, prior hx of substance abuse, chronic cervicalgia, prior EtOH abuse, was taken to [facility] ED earlier today by daughter after she received a phone call from patient's boyfriend stating that she was not in her usual state of health and had poor oral intake, he claimed last time patient had eaten was 2-3 days ago. When patient's daughter arrived to patient, (last seen Monday 8/2), patient was found to be altered, only oriented to self, musky gray skin color and she was breathless. Upon ER arrival, patient;s daughter and NOK was informed about her rapid decline in respiratory distress and blood pressure and proceeded to be endotracheally intubated.   Per patient's daughter patient has cut down in tobacco use due to worsening respiratory distress and also stopped drinking ETOH and prior use of methamphetamines.     Primary Discharge diagnoses: Acute hypoxic hypercapnic respiratory failure secondary to community-acquired pneumonia versus aspiration pneumonia.  Acute encephalopathy with prior neuro history.  Patient arrived on mechanical ventilation from [facility] ED on 8/7/2021.  Patient was treated with DuoNeb breathing treatments, vancomycin and Zosyn.  She remains sedated with propofol.  Patient had little to no improvement over the course of her hospitalization.  Patient was placed on BiPAP the morning of 8/14/2021.  patient opted for comfort care on 8/14/21.  She subsequently passed after 4am on 8/15/21.       Hospital Course Summary: Patient was admitted for     PCP  Patient Care Team:  Sayda Burnett MD as PCP - General    Consults:   Consults       Date and Time Order Name Status Description    8/11/2021 12:33 AM Inpatient Neurology  Consult General Completed     8/8/2021  4:56 AM Inpatient Pulmonology Consult Completed             Operations and Procedures Performed:       MRI Brain Without Contrast    Result Date: 8/12/2021  Narrative:   MRI Brain WO Clinical history: Follow-up stroke. Stroke 5/12/2021. Comparison: No pertinent prior study. Technique: Sagittal, axial and coronal images of the head were obtained using the standard protocol. Findings: The diffusion sequence demonstrates no evidence of restricted diffusion to indicate acute infarction. The gradient echo sequence shows no abnormal susceptibility artifact. The FLAIR sequence shows the ventricles to be of normal size and configuration. No extra-axial fluid collections. No significant shift of midline structures. There are scattered areas of FLAIR hyperintensity demonstrated in the deep white matter and subcortical white matter of the cerebral hemispheres. This is nonspecific but likely represents microvascular disease in this patient. Also demonstrated is a linear area of FLAIR hyperintensity extending from the right temporal lobe toward the mid body of the right lateral ventricle. This likely represents a developmental venous anomaly. Contrast-enhancement could provide additional information. The pituitary gland is within normal limits. The cervicomedullary junction is normal. The 7th and 8th cranial nerve complexes are within normal limits. There are trace bilateral mastoid effusions. Paranasal sinuses show prominent inflammatory change in the sphenoid sinus. There are mild inflammatory changes demonstrated in the ethmoid air cells and in the right maxillary sinus. No acute orbital findings.     Impression: Impression: 1.  No evidence of restricted diffusion to indicate acute infarction. 2.  Scattered areas of FLAIR hyperintensity within the deep white matter and subcortical white matter of the cerebral hemispheres likely representing microvascular disease in this patient. 3.   Linear area of FLAIR hyperintensity extending from the right temporal lobe toward the mid body of the right lateral ventricle. This likely represents a developmental venous anomaly. 4.  Trace bilateral mastoid effusions. Prominent inflammatory change in the sphenoid sinus with mild inflammatory change in the ethmoid air cells and right maxillary sinus. Signer Name: Cole Mcconnell MD  Signed: 8/12/2021 12:35 PM  Workstation Name: [id number]  [facility]    MRI Cervical Spine Without Contrast    Result Date: 8/12/2021  Narrative: MRI Spine Cervical WO INDICATION:  Stroke, follow-up study. Prior stroke 5/12/2021. Right arm and leg numbness. TECHNIQUE: MRI of the cervical spine without IV contrast. COMPARISON:  No pertinent prior study The exam is compromised by significant motion artifact. FINDINGS: The sagittal alignment shows slight anterolisthesis of C4 on C5. Vertebral body heights are maintained. No evidence of marrow replacement or infiltration. The cervicomedullary junction is normal. The spinal cord is of normal course and caliber. No syrinx is seen. No reproducible areas of signal abnormality identified. At C2-C3. Disc desiccation and minimal loss of disc height. Mild facet arthropathy. No spinal canal or neural foraminal compromise. At C3-C4, disc desiccation and severe loss of disc height. Moderate to severe bilateral facet arthropathy and uncovertebral hypertrophy. Broad-based central disc osteophyte complex. Effacement of the ventral thecal sac but no cord flattening. Mild to moderate narrowing of the neural foramina bilaterally. At C4-C5, disc desiccation and mild loss of disc height. Severe left facet arthropathy with moderate right facet arthropathy. Broad-based central disc osteophyte complex with uncovertebral hypertrophy. Effacement of the ventral thecal sac but no cord flattening. Moderate to severe narrowing of the left neural foramen with mild compromise of the right neural  foramen. At C5-C6, disc desiccation and severe loss of disc height. Broad-based central disc osteophyte complex slightly eccentric to the left. Uncovertebral hypertrophy and facet arthropathy. Effacement of the ventral thecal sac with mild flattening of the left side of the spinal cord. There is moderate narrowing of the neural foramina bilaterally. At C6-C7, disc desiccation and moderate loss of disc height. There is a focal left-sided disc osteophyte complex with effacement of the ventral thecal sac and flattening of the left side of the spinal cord. Facet arthropathy and uncovertebral hypertrophy with moderate compromise of the left neural foramen and mild narrowing of the right neural foramen. At C7-T1, disc desiccation and severe loss of disc height. Focal disc osteophyte complex eccentric to the left with effacement of the ventral thecal sac. No definite cord flattening. Moderate narrowing of the left neural foramen. Mild narrowing of the right neural foramen. Moderate facet arthropathy. Paravertebral soft tissues demonstrate no acute findings.     Impression: Severe cervical spondylosis as described. Please see the above report for a complete description of the individual levels. Signer Name: Cole Mcconnell MD  Signed: 8/12/2021 2:11 PM  Workstation Name: [id number]  [facility]    XR Chest 1 View    Result Date: 8/14/2021  Narrative: CR Chest 1 Vw INDICATION: Pneumonia COMPARISON:  8/9/2021 FINDINGS: Single portable AP view(s) of the chest. Since previous examination, aeration of the right lung base has improved. There is still dense consolidation at the left base, questionably improved. The upper lung fields are unchanged with a faint right upper lobe infiltrate again noted. The left apex is clear.     Impression: There is improvement at the right base since the previous examination. The left lung base shows perhaps slight improvement. Signer Name: Mirza Valdes MD  Signed: 8/14/2021  6:43 AM  Workstation Name: [id number]  [facility]    XR Chest 1 View    Result Date: 8/9/2021  Narrative: CR Chest 1 Vw INDICATION: Respiratory failure. Follow-up. COMPARISON:  8/7/2021 FINDINGS: Single portable AP view(s) of the chest. Endotracheal tube is in the mid trachea. Heart size remains normal. Infiltrates at the right lung base are not significantly changed. Infiltrates at the left lung base have worsened. No pneumothorax is seen.     Impression: Worsening left lower lung infiltrates with stable infiltrates at the right base. Signer Name: Mirza Ruiz MD  Signed: 8/9/2021 5:20 AM  Workstation Name: [facility]  [facility]    XR Chest 1 View    Result Date: 8/7/2021  Narrative: CR Chest 1 Vw INDICATION: Endotracheal tube placement COMPARISON:  None available. FINDINGS: Single portable AP view(s) of the chest. The tip of the endotracheal tube is approximately 5 cm above the waldo. This is in satisfactory position. The heart and mediastinal contours are normal. The lungs demonstrate patchy airspace disease most prominent in the right lung base. No obvious pleural fluid. No pneumothorax.  IMPRESSION: Endotracheal tube in satisfactory position. Patchy bilateral airspace disease most prominent in the right lung base. Signer Name: Cole Mcconnell MD  Signed: 8/7/2021 9:58 PM  Workstation Name: [id number]  [facility]    XR Abdomen KUB    Result Date: 8/8/2021  Narrative: ABDOMEN, 8/8/2021 (08:20) HISTORY:  Feeding tube placement.  TECHNIQUE: AP portable radiograph of the upper abdomen.  FINDINGS: On the 2nd of two images, the Dobbhoff tube tip is in midportion of the stomach. Dense consolidation of the left lower lobe and patchy right base pulmonary infiltrate are visible.     Impression: Dobbhoff tube tip in the mid stomach. Signer Name: Truong Gustafson MD  Signed: 8/8/2021 9:01 AM  Workstation Name: [email or web address]  [facility]      Allergies:  is allergic to contrast dye, gabapentin, naproxen, nsaids, atorvastatin, cyclobenzaprine, and iodinated diagnostic agents.    Josh  reviewed    Discharge Medications:     Discharge Medications        ASK your doctor about these medications        Instructions Start Date   atorvastatin 40 MG tablet  Commonly known as: LIPITOR   40 mg, Oral, Daily      baclofen 10 MG tablet  Commonly known as: LIORESAL   10 mg, Oral, 3 Times Daily      fluticasone 50 MCG/ACT nasal spray  Commonly known as: FLONASE   2 sprays, Nasal, Daily      lisinopril 20 MG tablet  Commonly known as: PRINIVIL,ZESTRIL   20 mg, Oral, Daily      metoprolol tartrate 50 MG tablet  Commonly known as: LOPRESSOR   50 mg, Oral, 2 Times Daily      pantoprazole 40 MG EC tablet  Commonly known as: PROTONIX   40 mg, Oral, Daily      QUEtiapine 100 MG tablet  Commonly known as: SEROquel   100 mg, Oral, Nightly               Last Lab Results:   Lab Results (most recent)       Procedure Component Value Units Date/Time    POC Glucose Once [[id number]]  (Abnormal) Collected: 08/14/21 0933    Specimen: Blood Updated: 08/14/21 0939     Glucose 139 mg/dL      Comment: Meter: [id number] : [id number] Monty Castle CNA       Blood Gas, Arterial - [[id number]]  (Abnormal) Collected: 08/14/21 0930    Specimen: Arterial Blood Updated: 08/14/21 0939     Site Left Radial     Kris's Test Positive     pH, Arterial 7.222 pH units      Comment: 84 Value below reference range        pCO2, Arterial >102.0 mm Hg      Comment: 86 Value above critical limit        pO2, Arterial 49.7 mm Hg      Comment: 85 Value below critical limit        HCO3, Arterial 45.7 mmol/L      Comment: 83 Value above reference range        Base Excess, Arterial 12.7 mmol/L      Comment: 83 Value above reference range        O2 Saturation, Arterial 83.3 %      Comment: 84 Value below reference range        Hemoglobin, Blood Gas 14.4 g/dL      Temperature 37.0  C      Barometric Pressure for Blood Gas 743 mmHg      Modality Nasal Cannula     Flow Rate 1.0 lpm      Ventilator Mode NA     Notified Who RB AND V DR ERWIN     Notified By [id number]     Notified Time 08/14/2021 09:45     Collected by [id number]     Comment: Meter: [id number]     :  [id number]        pCO2, Temperature Corrected >102 mm Hg      pH, Temp Corrected 7.222 pH Units      pO2, Temperature Corrected 49.7 mm Hg     Blood Culture - Blood, Arm, Left [[id number]] Collected: 08/07/21 2243    Specimen: Blood from Arm, Left Updated: 08/12/21 2315     Blood Culture No growth at 5 days    Blood Culture - Blood, Arm, Left [[id number]] Collected: 08/07/21 2054    Specimen: Blood from Arm, Left Updated: 08/12/21 2116     Blood Culture No growth at 5 days    Respiratory Culture - Sputum, ET Suction [[id number]] Collected: 08/08/21 1337    Specimen: Sputum from ET Suction Updated: 08/11/21 1631     Respiratory Culture Scant growth (1+) Normal Respiratory Ena     Gram Stain Moderate (3+) WBCs seen      No epithelial cells seen      Rare (1+) Normal respiratory ena    Comprehensive Metabolic Panel [[id number]]  (Abnormal) Collected: 08/11/21 0344    Specimen: Blood Updated: 08/11/21 0446     Glucose 106 mg/dL      BUN 7 mg/dL      Creatinine 0.42 mg/dL      Sodium 137 mmol/L      Potassium 3.8 mmol/L      Chloride 94 mmol/L      CO2 38.9 mmol/L      Calcium 8.4 mg/dL      Total Protein 5.9 g/dL      Albumin 2.80 g/dL      ALT (SGPT) 20 U/L      AST (SGOT) 41 U/L      Alkaline Phosphatase 67 U/L      Total Bilirubin 0.6 mg/dL      eGFR Non African Amer >150 mL/min/1.73      Globulin 3.1 gm/dL      A/G Ratio 0.9 g/dL      BUN/Creatinine Ratio 16.7     Anion Gap 4.1 mmol/L     Narrative:      GFR Normal >60  Chronic Kidney Disease <60  Kidney Failure <15      CBC & Differential [[id number]]  (Abnormal) Collected: 08/11/21 0344    Specimen: Blood Updated: 08/11/21 0430    Narrative:      The following orders were  created for panel order CBC & Differential.  Procedure                               Abnormality         Status                     ---------                               -----------         ------                     CBC Auto Differential[[id number]]        Abnormal            Final result                 Please view results for these tests on the individual orders.    CBC Auto Differential [[id number]]  (Abnormal) Collected: 08/11/21 0344    Specimen: Blood Updated: 08/11/21 0430     WBC 9.27 10*3/mm3      RBC 4.20 10*6/mm3      Hemoglobin 13.3 g/dL      Hematocrit 41.9 %      MCV 99.8 fL      MCH 31.7 pg      MCHC 31.7 g/dL      RDW 12.8 %      RDW-SD 46.5 fl      MPV 9.8 fL      Platelets 223 10*3/mm3      Neutrophil % 80.6 %      Lymphocyte % 10.4 %      Monocyte % 7.7 %      Eosinophil % 0.3 %      Basophil % 0.2 %      Immature Grans % 0.8 %      Neutrophils, Absolute 7.48 10*3/mm3      Lymphocytes, Absolute 0.96 10*3/mm3      Monocytes, Absolute 0.71 10*3/mm3      Eosinophils, Absolute 0.03 10*3/mm3      Basophils, Absolute 0.02 10*3/mm3      Immature Grans, Absolute 0.07 10*3/mm3      nRBC 0.0 /100 WBC     Respiratory Culture - Aspirate, ET Suction [[id number]] Collected: 08/08/21 0438    Specimen: Aspirate from ET Suction Updated: 08/10/21 1208     Respiratory Culture Scant growth (1+) Normal Respiratory Ena: NO S.aureus/MRSA or Pseudomonas aeruginosa     Gram Stain Many (4+) WBCs per low power field      Rare (1+) Gram positive cocci in pairs      Rare (1+) Normal respiratory ena    Blood Gas, Venous - [[id number]]  (Abnormal) Collected: 08/10/21 0845    Specimen: Venous Blood Updated: 08/10/21 0851     Site OTHER     pH, Venous 7.377 pH Units      pCO2, Venous 73.8 mm Hg      Comment: 83 Value above reference range        pO2, Venous 69.8 mm Hg      Comment: 83 Value above reference range        HCO3, Venous 43.4 mmol/L      Comment: 83 Value above reference range        Base Excess, Venous 14.5 mmol/L       Comment: 83 Value above reference range        O2 Saturation, Venous 95.1 %      Comment: 83 Value above reference range        Hemoglobin, Blood Gas 13.8 g/dL      Temperature 37.0 C      Barometric Pressure for Blood Gas 741 mmHg      Modality Ventilator     FIO2 40 %      Ventilator Mode PSV     PSV 10.0 cmH2O      Collected by [id number]     Comment: Meter: [id number]     :  [id number]       Procalcitonin [[id number]]  (Normal) Collected: 08/10/21 0424    Specimen: Blood Updated: 08/10/21 0530     Procalcitonin 0.02 ng/mL     Narrative:      Results may be falsely decreased if patient taking Biotin.     Renal Function Panel [[id number]]  (Abnormal) Collected: 08/10/21 0424    Specimen: Blood Updated: 08/10/21 0509     Glucose 133 mg/dL      BUN 10 mg/dL      Creatinine 0.40 mg/dL      Sodium 135 mmol/L      Potassium 4.3 mmol/L      Chloride 94 mmol/L      CO2 40.3 mmol/L      Calcium 8.2 mg/dL      Albumin 2.40 g/dL      Phosphorus 2.0 mg/dL      Anion Gap 0.7 mmol/L      BUN/Creatinine Ratio 25.0     eGFR Non African Amer >150 mL/min/1.73     Narrative:      GFR Normal >60  Chronic Kidney Disease <60  Kidney Failure <15      Blood Gas, Arterial - [[id number]]  (Abnormal) Collected: 08/10/21 0450    Specimen: Arterial Blood Updated: 08/10/21 0501     Site Right Radial     Kris's Test Positive     pH, Arterial 7.376 pH units      pCO2, Arterial 75.2 mm Hg      Comment: 86 Value above critical limit        pO2, Arterial 71.6 mm Hg      Comment: 84 Value below reference range        HCO3, Arterial 44.0 mmol/L      Comment: 83 Value above reference range        Base Excess, Arterial 15.3 mmol/L      Comment: 83 Value above reference range        O2 Saturation, Arterial 95.1 %      Hemoglobin, Blood Gas 12.7 g/dL      Temperature 37.0 C      Barometric Pressure for Blood Gas 740 mmHg      Modality Ventilator     FIO2 45 %      Ventilator Mode AC     Set Tidal Volume 400     Set Mech Resp Rate 22.0     PEEP  5.0     Notified [facility] DR CISNEROS READ BACK AND VERIFIED     Notified By [id number]     Notified Time 08/10/2021 05:03     Collected by [id number]     Comment: Meter: [id number]     :  [id number]        pCO2, Temperature Corrected 75.2 mm Hg      pH, Temp Corrected 7.376 pH Units      pO2, Temperature Corrected 71.6 mm Hg     Vancomycin, Random [[id number]]  (Normal) Collected: 08/09/21 1330    Specimen: Blood Updated: 08/09/21 1402     Vancomycin Random 9.90 mcg/mL     Narrative:      Therapeutic Ranges for Vancomycin    Vancomycin Random   5.0-40.0 mcg/mL  Vancomycin Trough   5.0-20.0 mcg/mL  Vancomycin Peak     20.0-40.0 mcg/mL    MRSA Screen Culture (Outpatient) - Swab, Nares [[id number]]  (Normal) Collected: 08/08/21 0150    Specimen: Swab from Nares Updated: 08/09/21 1249     MRSA Screen Cx No Methicillin Resistant Staphylococcus aureus isolated    Respiratory Panel, PCR (WITHOUT COVID) - Swab, Nasopharynx [[id number]]  (Normal) Collected: 08/08/21 1952    Specimen: Swab from Nasopharynx Updated: 08/09/21 1128     ADENOVIRUS, PCR Not Detected     Coronavirus 229E Not Detected     Coronavirus HKU1 Not Detected     Coronavirus NL63 Not Detected     Coronavirus OC43 Not Detected     Human Metapneumovirus Not Detected     Human Rhinovirus/Enterovirus Not Detected     Influenza B PCR Not Detected     Parainfluenza Virus 1 Not Detected     Parainfluenza Virus 2 Not Detected     Parainfluenza Virus 3 Not Detected     Parainfluenza Virus 4 Not Detected     Bordetella pertussis pcr Not Detected     Chlamydophila pneumoniae PCR Not Detected     Mycoplasma pneumo by PCR Not Detected     Influenza A PCR Not Detected     RSV, PCR Not Detected     Bordetella parapertussis PCR Not Detected    Narrative:      The coronavirus on the RVP is NOT COVID-19 and is NOT indicative of infection with COVID-19.    In the setting of a positive respiratory panel with a viral infection PLUS a negative procalcitonin without other  underlying concern for bacterial infection, consider observing off antibiotics or discontinuation of antibiotics and continue supportive care. If the respiratory panel is positive for atypical bacterial infection (Bordetella pertussis, Chlamydophila pneumoniae, or Mycoplasma pneumoniae), consider antibiotic de-escalation to target atypical bacterial infection.    BNP [[id number]]  (Normal) Collected: 08/09/21 0419    Specimen: Blood Updated: 08/09/21 0554     proBNP 228.2 pg/mL     Narrative:      Among patients with dyspnea, NT-proBNP is highly sensitive for the detection of acute congestive heart failure. In addition NT-proBNP of <300 pg/ml effectively rules out acute congestive heart failure with 99% negative predictive value.    Results may be falsely decreased if patient taking Biotin.      Procalcitonin [[id number]]  (Normal) Collected: 08/09/21 0419    Specimen: Blood Updated: 08/09/21 0551     Procalcitonin 0.03 ng/mL     Narrative:      Results may be falsely decreased if patient taking Biotin.     Magnesium [[id number]]  (Normal) Collected: 08/09/21 0419    Specimen: Blood Updated: 08/09/21 0516     Magnesium 2.0 mg/dL     Comprehensive Metabolic Panel [[id number]]  (Abnormal) Collected: 08/09/21 0419    Specimen: Blood Updated: 08/09/21 0516     Glucose 150 mg/dL      BUN 13 mg/dL      Creatinine 0.52 mg/dL      Sodium 138 mmol/L      Potassium 4.7 mmol/L      Chloride 99 mmol/L      CO2 34.8 mmol/L      Calcium 8.4 mg/dL      Total Protein 5.5 g/dL      Albumin 2.60 g/dL      ALT (SGPT) 13 U/L      AST (SGOT) 20 U/L      Alkaline Phosphatase 68 U/L      Total Bilirubin 0.2 mg/dL      eGFR Non African Amer 122 mL/min/1.73      Globulin 2.9 gm/dL      A/G Ratio 0.9 g/dL      BUN/Creatinine Ratio 25.0     Anion Gap 4.2 mmol/L     Narrative:      GFR Normal >60  Chronic Kidney Disease <60  Kidney Failure <15      CBC & Differential [[id number]]  (Abnormal) Collected: 08/09/21 0419    Specimen: Blood Updated:  08/09/21 0513    Narrative:      The following orders were created for panel order CBC & Differential.  Procedure                               Abnormality         Status                     ---------                               -----------         ------                     CBC Auto Differential[[id number]]        Abnormal            Final result               Scan Slide[[id number]]                                       Final result                 Please view results for these tests on the individual orders.    Scan Slide [[id number]] Collected: 08/09/21 0419    Specimen: Blood Updated: 08/09/21 0513     Macrocytes Slight/1+     WBC Morphology Normal     Platelet Morphology Normal    CBC Auto Differential [[id number]]  (Abnormal) Collected: 08/09/21 0419    Specimen: Blood Updated: 08/09/21 0500     WBC 13.40 10*3/mm3      RBC 4.08 10*6/mm3      Hemoglobin 13.0 g/dL      Hematocrit 42.9 %      .1 fL      MCH 31.9 pg      MCHC 30.3 g/dL      RDW 12.9 %      RDW-SD 50.9 fl      MPV 9.8 fL      Platelets 203 10*3/mm3      Neutrophil % 86.3 %      Lymphocyte % 7.3 %      Monocyte % 5.7 %      Eosinophil % 0.0 %      Basophil % 0.1 %      Immature Grans % 0.6 %      Neutrophils, Absolute 11.57 10*3/mm3      Lymphocytes, Absolute 0.98 10*3/mm3      Monocytes, Absolute 0.76 10*3/mm3      Eosinophils, Absolute 0.00 10*3/mm3      Basophils, Absolute 0.01 10*3/mm3      Immature Grans, Absolute 0.08 10*3/mm3     Legionella Antigen, Urine - Urine, Urine, Clean Catch [[id number]]  (Normal) Collected: 08/08/21 1316    Specimen: Urine, Clean Catch Updated: 08/08/21 1346     LEGIONELLA ANTIGEN, URINE Negative    S. Pneumo Ag Urine or CSF - Urine, Urine, Clean Catch [[id number]]  (Normal) Collected: 08/08/21 1316    Specimen: Urine, Clean Catch Updated: 08/08/21 1346     Strep Pneumo Ag Negative    Urine Drug Screen - Urine, Catheter [[id number]]  (Normal) Collected: 08/08/21 1315    Specimen: Urine, Catheter Updated: 08/08/21  1335     THC, Screen, Urine Negative     Phencyclidine (PCP), Urine Negative     Cocaine Screen, Urine Negative     Methamphetamine, Ur Negative     Opiate Screen Negative     Amphetamine Screen, Urine Negative     Benzodiazepine Screen, Urine Negative     Tricyclic Antidepressants Screen Negative     Methadone Screen, Urine Negative     Barbiturates Screen, Urine Negative     Oxycodone Screen, Urine Negative     Propoxyphene Screen Negative     Buprenorphine, Screen, Urine Negative    Narrative:      Urine drug screen results are to be used for medical purposes only.  They are not to be used for legal purposes such as employment testing.  Negative results do not necessarily mean the complete absence of a subtance, but rather that the result is less than the cutoff for that substance.  Positive results are unconfirmed and considered Preliminary Positive.  [facility] does not automatically confirm Postitive Unconfirmed results.  The physician may request (order) an Unconfirmed Positive result to be sent out for confirmation.      Negative Thresholds for Drugs Screened:    THC screen, urine                          50 ng/ml  Phenycyclidine (PCP), urine                25 ng/ml  Cocaine screen, urine                     150 ng/ml  Methamphetamine, urine                    500 ng/ml  Opiate screen, urine                      100 ng/ml  Amphetamine screen, urine                 500 ng/ml  Benzodiazepine screen, urine              150 ng/ml  Tricyclic Antidepressants screen, urine   300 ng/ml  Methadone screen, urine                   200 ng/ml  Barbiturates screen, urine                200 ng/ml  Oxycodone screen, urine                   100 ng/ml  Propoxyphene screen, urine                300 ng/ml  Buprenorphine screen, urine                10 ng/ml    Phosphorus [[id number]]  (Normal) Collected: 08/08/21 0427    Specimen: Blood Updated: 08/08/21 0549     Phosphorus 3.5 mg/dL     Basic Metabolic Panel  [[id number]]  (Abnormal) Collected: 08/08/21 0427    Specimen: Blood Updated: 08/08/21 0500     Glucose 99 mg/dL      BUN 20 mg/dL      Creatinine 0.62 mg/dL      Sodium 135 mmol/L      Potassium 5.1 mmol/L      Comment: Slight hemolysis detected by analyzer. Results may be affected.        Chloride 98 mmol/L      CO2 33.0 mmol/L      Calcium 8.6 mg/dL      eGFR Non African Amer 100 mL/min/1.73      BUN/Creatinine Ratio 32.3     Anion Gap 4.0 mmol/L     Narrative:      GFR Normal >60  Chronic Kidney Disease <60  Kidney Failure <15      Magnesium [[id number]]  (Normal) Collected: 08/08/21 0427    Specimen: Blood Updated: 08/08/21 0454     Magnesium 2.0 mg/dL     TSH [[id number]]  (Normal) Collected: 08/07/21 2052    Specimen: Blood Updated: 08/07/21 2138     TSH 0.278 uIU/mL     Hemoglobin A1c [[id number]]  (Abnormal) Collected: 08/07/21 2052    Specimen: Blood Updated: 08/07/21 2132     Hemoglobin A1C 5.70 %     Narrative:      Hemoglobin A1C Ranges:    Increased Risk for Diabetes  5.7% to 6.4%  Diabetes                     >= 6.5%  Diabetic Goal                < 7.0%    Basic Metabolic Panel [[id number]]  (Abnormal) Collected: 08/07/21 2052    Specimen: Blood Updated: 08/07/21 2126     Glucose 100 mg/dL      BUN 23 mg/dL      Creatinine 0.71 mg/dL      Sodium 136 mmol/L      Potassium 4.3 mmol/L      Chloride 100 mmol/L      CO2 31.8 mmol/L      Calcium 7.7 mg/dL      eGFR Non African Amer 85 mL/min/1.73      BUN/Creatinine Ratio 32.4     Anion Gap 4.2 mmol/L     Narrative:      GFR Normal >60  Chronic Kidney Disease <60  Kidney Failure <15            Imaging Results (Most Recent)       Procedure Component Value Units Date/Time    XR Chest 1 View [[id number]] Collected: 08/14/21 0643     Updated: 08/14/21 0646    Narrative:      CR Chest 1 Vw    INDICATION:   Pneumonia     COMPARISON:    8/9/2021    FINDINGS:  Single portable AP view(s) of the chest.        Since previous examination, aeration of the right lung base has  improved. There is still dense consolidation at the left base, questionably improved. The upper lung fields are unchanged with a faint right upper lobe infiltrate again noted. The left apex  is clear.       Impression:      There is improvement at the right base since the previous examination. The left lung base shows perhaps slight improvement.    Signer Name: Mirza Valdes MD   Signed: 8/14/2021 6:43 AM   Workstation Name: [id number]    [facility]    SCANNED - IMAGING [[id number]] Resulted: 08/07/21     Updated: 08/13/21 1113    MRI Cervical Spine Without Contrast [[id number]] Collected: 08/12/21 1411     Updated: 08/12/21 1413    Narrative:      MRI Spine Cervical WO    INDICATION:    Stroke, follow-up study. Prior stroke 5/12/2021. Right arm and leg numbness.    TECHNIQUE:   MRI of the cervical spine without IV contrast.    COMPARISON:    No pertinent prior study    The exam is compromised by significant motion artifact.    FINDINGS:  The sagittal alignment shows slight anterolisthesis of C4 on C5. Vertebral body heights are maintained. No evidence of marrow replacement or infiltration. The cervicomedullary junction is normal. The spinal cord is of normal course and caliber. No syrinx  is seen. No reproducible areas of signal abnormality identified.    At C2-C3. Disc desiccation and minimal loss of disc height. Mild facet arthropathy. No spinal canal or neural foraminal compromise.    At C3-C4, disc desiccation and severe loss of disc height. Moderate to severe bilateral facet arthropathy and uncovertebral hypertrophy. Broad-based central disc osteophyte complex. Effacement of the ventral thecal sac but no cord flattening. Mild to  moderate narrowing of the neural foramina bilaterally.    At C4-C5, disc desiccation and mild loss of disc height. Severe left facet arthropathy with moderate right facet arthropathy. Broad-based central disc osteophyte complex with uncovertebral hypertrophy.  Effacement of the ventral thecal sac but no cord  flattening. Moderate to severe narrowing of the left neural foramen with mild compromise of the right neural foramen.    At C5-C6, disc desiccation and severe loss of disc height. Broad-based central disc osteophyte complex slightly eccentric to the left. Uncovertebral hypertrophy and facet arthropathy. Effacement of the ventral thecal sac with mild flattening of the left  side of the spinal cord. There is moderate narrowing of the neural foramina bilaterally.    At C6-C7, disc desiccation and moderate loss of disc height. There is a focal left-sided disc osteophyte complex with effacement of the ventral thecal sac and flattening of the left side of the spinal cord. Facet arthropathy and uncovertebral hypertrophy  with moderate compromise of the left neural foramen and mild narrowing of the right neural foramen.    At C7-T1, disc desiccation and severe loss of disc height. Focal disc osteophyte complex eccentric to the left with effacement of the ventral thecal sac. No definite cord flattening. Moderate narrowing of the left neural foramen. Mild narrowing of the  right neural foramen. Moderate facet arthropathy.    Paravertebral soft tissues demonstrate no acute findings.      Impression:      Severe cervical spondylosis as described.    Please see the above report for a complete description of the individual levels.    Signer Name: Cole Mcconnell MD   Signed: 8/12/2021 2:11 PM   Workstation Name: [id number]    [facility]    MRI Brain Without Contrast [[id number]] Collected: 08/12/21 1235     Updated: 08/12/21 1238    Narrative:          MRI Brain WO     Clinical history: Follow-up stroke. Stroke 5/12/2021.    Comparison: No pertinent prior study.    Technique: Sagittal, axial and coronal images of the head were obtained using the standard protocol.    Findings:  The diffusion sequence demonstrates no evidence of restricted diffusion to indicate  acute infarction. The gradient echo sequence shows no abnormal susceptibility artifact.    The FLAIR sequence shows the ventricles to be of normal size and configuration. No extra-axial fluid collections. No significant shift of midline structures. There are scattered areas of FLAIR hyperintensity demonstrated in the deep white matter and  subcortical white matter of the cerebral hemispheres. This is nonspecific but likely represents microvascular disease in this patient.    Also demonstrated is a linear area of FLAIR hyperintensity extending from the right temporal lobe toward the mid body of the right lateral ventricle. This likely represents a developmental venous anomaly. Contrast-enhancement could provide additional  information.    The pituitary gland is within normal limits. The cervicomedullary junction is normal. The 7th and 8th cranial nerve complexes are within normal limits.    There are trace bilateral mastoid effusions. Paranasal sinuses show prominent inflammatory change in the sphenoid sinus. There are mild inflammatory changes demonstrated in the ethmoid air cells and in the right maxillary sinus. No acute orbital  findings.      Impression:      Impression:  1.  No evidence of restricted diffusion to indicate acute infarction.    2.  Scattered areas of FLAIR hyperintensity within the deep white matter and subcortical white matter of the cerebral hemispheres likely representing microvascular disease in this patient.    3.  Linear area of FLAIR hyperintensity extending from the right temporal lobe toward the mid body of the right lateral ventricle. This likely represents a developmental venous anomaly.    4.  Trace bilateral mastoid effusions. Prominent inflammatory change in the sphenoid sinus with mild inflammatory change in the ethmoid air cells and right maxillary sinus.        Signer Name: Cole Mcconnell MD   Signed: 8/12/2021 12:35 PM   Workstation Name: [id number]    [facility]    XR Chest 1 View [[id number]] Collected: 08/09/21 0520     Updated: 08/09/21 0522    Narrative:      CR Chest 1 Vw    INDICATION:   Respiratory failure. Follow-up.     COMPARISON:    8/7/2021    FINDINGS:  Single portable AP view(s) of the chest.    Endotracheal tube is in the mid trachea. Heart size remains normal. Infiltrates at the right lung base are not significantly changed. Infiltrates at the left lung base have worsened. No pneumothorax is seen.       Impression:      Worsening left lower lung infiltrates with stable infiltrates at the right base.    Signer Name: Mirza Ruiz MD   Signed: 8/9/2021 5:20 AM   Workstation Name: [id number]    [facility]    XR Abdomen KUB [[id number]] Collected: 08/08/21 0901     Updated: 08/08/21 0903    Narrative:      ABDOMEN, 8/8/2021 (08:20)    HISTORY:    Feeding tube placement.      TECHNIQUE:  AP portable radiograph of the upper abdomen.      FINDINGS:  On the 2nd of two images, the Dobbhoff tube tip is in midportion of the stomach.    Dense consolidation of the left lower lobe and patchy right base pulmonary infiltrate are visible.      Impression:      Dobbhoff tube tip in the mid stomach.    Signer Name: Truong Gustafson MD   Signed: 8/8/2021 9:01 AM   Work[facility]: [id number]    [facility]    XR Chest 1 View [[id number]] Collected: 08/07/21 2158     Updated: 08/07/21 2202    Narrative:      CR Chest 1 Vw    INDICATION:   Endotracheal tube placement     COMPARISON:    None available.    FINDINGS:  Single portable AP view(s) of the chest.    The tip of the endotracheal tube is approximately 5 cm above the waldo. This is in satisfactory position.    The heart and mediastinal contours are normal.    The lungs demonstrate patchy airspace disease most prominent in the right lung base. No obvious pleural fluid. No pneumothorax.     IMPRESSION:  Endotracheal tube in satisfactory position.    Patchy  bilateral airspace disease most prominent in the right lung base.    Signer Name: Cole Mcconnell MD   Signed: 8/7/2021 9:58 PM   Workstation Name: [id number]    [facility]            PROCEDURES None      Condition on Discharge:  Stable, Improved.     Physical Exam at Discharge  Vital Signs       Physical Exam  On exam the patient did not respond to verbal or physical stimuli. I found her to have absent heart and breath sounds.  Absent peripheral pulses. Pupils are fixed and dilated.     Discharge Disposition  To [facility]         Follow-up Appointments  No future appointments.       Test Results Pending at Discharge      Time spent: 28 minutes.     Addendum: Patient also had severe malnutrition, as noted on 8/13/21 by Dietitian and MSA assessment states patient meets criteria for severe malnutrition.  Patient had wt loss of 19.4% in l year.

## 2021-08-18 NOTE — PAYOR COMM NOTE
"Flores Marie (Dcsd. Female)     ATTN: NURSE REVIEWER  RE: DISCHARGE NOTIFICATION   REF#[id number]  PLS REPLY TO KARINA GARCIA [phone] FAX# [phone]      Date of Birth Social Security Number Address Home Phone MRN    1965  [address] [place] [address] [id number]    Amish Marital Status          None        Admission Date Admission Type Admitting Provider Attending Provider Department, Room/Bed    21 Urgent Jhon Mcgill DO  [facility] SURG, 1407/    Discharge Date Discharge Disposition Discharge Destination        8/15/2021               Attending Provider: (none)   Allergies: Contrast Dye, Gabapentin, Naproxen, Nsaids, Atorvastatin, Cyclobenzaprine, Iodinated Diagnostic Agents    Isolation: None   Infection: None   Code Status: Prior    Ht: 165.1 cm (65\")   Wt: 65.8 kg (145 lb 1.6 oz)    Admission Cmt: None   Principal Problem: Community acquired pneumonia of right lower lobe of lung [J18.9]                 Active Insurance as of 2021     Primary Coverage     Payor Plan Insurance Group Employer/Plan Group    [place] [id number]     Payor Plan Address Payor Plan Phone Number Payor Plan Fax Number Effective Dates    [employer] [address] [address] [phone]  2021 - None Entered    [facility] [id number]       Subscriber Name Subscriber Birth Date Member ID       FLORES MARIE 1965 [id number]                 Emergency Contacts      (Rel.) Home Phone Work Phone Mobile Phone    JAMAAL MORRISON (Daughter) [phone] -- --    AlishaFadia (Daughter) [phone] -- --    KelleeKurtis (Son) [phone] -- --               Discharge Summary      Jhon Mcgill DO at 08/15/21 041Nestor Marie  1965  [id number]    Hospitalists Discharge Summary    Date of Admission: 2021  Date of Discharge:  2021  Time of death 0417 8/15/21.     History of Present " Illness from [facility] on admit: 57 y/o F with hx of CVA in April-march 2021, prior hx of substance abuse, chronic cervicalgia, prior EtOH abuse, was taken to [facility] ED earlier today by daughter after she received a phone call from patient's boyfriend stating that she was not in her usual state of health and had poor oral intake, he claimed last time patient had eaten was 2-3 days ago. When patient's daughter arrived to patient, (last seen Monday 8/2), patient was found to be altered, only oriented to self, musky gray skin color and she was breathless. Upon ER arrival, patient;s daughter and NOK was informed about her rapid decline in respiratory distress and blood pressure and proceeded to be endotracheally intubated.   Per patient's daughter patient has cut down in tobacco use due to worsening respiratory distress and also stopped drinking ETOH and prior use of methamphetamines.     Primary Discharge diagnoses: Acute hypoxic hypercapnic respiratory failure secondary to community-acquired pneumonia versus aspiration pneumonia.  Acute encephalopathy with prior neuro history.  Patient arrived on mechanical ventilation from [facility] ED on 8/7/2021.  Patient was treated with DuoNeb breathing treatments, vancomycin and Zosyn.  She remains sedated with propofol.  Patient had little to no improvement over the course of her hospitalization.  Patient was placed on BiPAP the morning of 8/14/2021.  patient opted for comfort care on 8/14/21.  She subsequently passed after 4am on 8/15/21.       Hospital Course Summary: Patient was admitted for     PCP  Patient Care Team:  Sayda Burnett MD as PCP - General    Consults:   Consults     Date and Time Order Name Status Description    8/11/2021 12:33 AM Inpatient Neurology Consult General Completed     8/8/2021  4:56 AM Inpatient Pulmonology Consult Completed           Operations and Procedures Performed:       MRI Brain Without Contrast    Result Date:  8/12/2021  Narrative:   MRI Brain WO Clinical history: Follow-up stroke. Stroke 5/12/2021. Comparison: No pertinent prior study. Technique: Sagittal, axial and coronal images of the head were obtained using the standard protocol. Findings: The diffusion sequence demonstrates no evidence of restricted diffusion to indicate acute infarction. The gradient echo sequence shows no abnormal susceptibility artifact. The FLAIR sequence shows the ventricles to be of normal size and configuration. No extra-axial fluid collections. No significant shift of midline structures. There are scattered areas of FLAIR hyperintensity demonstrated in the deep white matter and subcortical white matter of the cerebral hemispheres. This is nonspecific but likely represents microvascular disease in this patient. Also demonstrated is a linear area of FLAIR hyperintensity extending from the right temporal lobe toward the mid body of the right lateral ventricle. This likely represents a developmental venous anomaly. Contrast-enhancement could provide additional information. The pituitary gland is within normal limits. The cervicomedullary junction is normal. The 7th and 8th cranial nerve complexes are within normal limits. There are trace bilateral mastoid effusions. Paranasal sinuses show prominent inflammatory change in the sphenoid sinus. There are mild inflammatory changes demonstrated in the ethmoid air cells and in the right maxillary sinus. No acute orbital findings.     Impression: Impression: 1.  No evidence of restricted diffusion to indicate acute infarction. 2.  Scattered areas of FLAIR hyperintensity within the deep white matter and subcortical white matter of the cerebral hemispheres likely representing microvascular disease in this patient. 3.  Linear area of FLAIR hyperintensity extending from the right temporal lobe toward the mid body of the right lateral ventricle. This likely represents a developmental venous anomaly. 4.   Trace bilateral mastoid effusions. Prominent inflammatory change in the sphenoid sinus with mild inflammatory change in the ethmoid air cells and right maxillary sinus. Signer Name: Cole Mcconnell MD  Signed: 8/12/2021 12:35 PM  Workstation Name: [id number]  [facility]    MRI Cervical Spine Without Contrast    Result Date: 8/12/2021  Narrative: MRI Spine Cervical WO INDICATION:  Stroke, follow-up study. Prior stroke 5/12/2021. Right arm and leg numbness. TECHNIQUE: MRI of the cervical spine without IV contrast. COMPARISON:  No pertinent prior study The exam is compromised by significant motion artifact. FINDINGS: The sagittal alignment shows slight anterolisthesis of C4 on C5. Vertebral body heights are maintained. No evidence of marrow replacement or infiltration. The cervicomedullary junction is normal. The spinal cord is of normal course and caliber. No syrinx is seen. No reproducible areas of signal abnormality identified. At C2-C3. Disc desiccation and minimal loss of disc height. Mild facet arthropathy. No spinal canal or neural foraminal compromise. At C3-C4, disc desiccation and severe loss of disc height. Moderate to severe bilateral facet arthropathy and uncovertebral hypertrophy. Broad-based central disc osteophyte complex. Effacement of the ventral thecal sac but no cord flattening. Mild to moderate narrowing of the neural foramina bilaterally. At C4-C5, disc desiccation and mild loss of disc height. Severe left facet arthropathy with moderate right facet arthropathy. Broad-based central disc osteophyte complex with uncovertebral hypertrophy. Effacement of the ventral thecal sac but no cord flattening. Moderate to severe narrowing of the left neural foramen with mild compromise of the right neural foramen. At C5-C6, disc desiccation and severe loss of disc height. Broad-based central disc osteophyte complex slightly eccentric to the left. Uncovertebral hypertrophy and facet  arthropathy. Effacement of the ventral thecal sac with mild flattening of the left side of the spinal cord. There is moderate narrowing of the neural foramina bilaterally. At C6-C7, disc desiccation and moderate loss of disc height. There is a focal left-sided disc osteophyte complex with effacement of the ventral thecal sac and flattening of the left side of the spinal cord. Facet arthropathy and uncovertebral hypertrophy with moderate compromise of the left neural foramen and mild narrowing of the right neural foramen. At C7-T1, disc desiccation and severe loss of disc height. Focal disc osteophyte complex eccentric to the left with effacement of the ventral thecal sac. No definite cord flattening. Moderate narrowing of the left neural foramen. Mild narrowing of the right neural foramen. Moderate facet arthropathy. Paravertebral soft tissues demonstrate no acute findings.     Impression: Severe cervical spondylosis as described. Please see the above report for a complete description of the individual levels. Signer Name: Cole Mcconnell MD  Signed: 8/12/2021 2:11 PM  Workstation Name: [id number]  [facility]    XR Chest 1 View    Result Date: 8/14/2021  Narrative: CR Chest 1 Vw INDICATION: Pneumonia COMPARISON:  8/9/2021 FINDINGS: Single portable AP view(s) of the chest. Since previous examination, aeration of the right lung base has improved. There is still dense consolidation at the left base, questionably improved. The upper lung fields are unchanged with a faint right upper lobe infiltrate again noted. The left apex is clear.     Impression: There is improvement at the right base since the previous examination. The left lung base shows perhaps slight improvement. Signer Name: Mirza Valdes MD  Signed: 8/14/2021 6:43 AM  Workstation Name: [id number]  [facility]    XR Chest 1 View    Result Date: 8/9/2021  Narrative: CR Chest 1 Vw INDICATION: Respiratory failure.  Follow-up. COMPARISON:  8/7/2021 FINDINGS: Single portable AP view(s) of the chest. Endotracheal tube is in the mid trachea. Heart size remains normal. Infiltrates at the right lung base are not significantly changed. Infiltrates at the left lung base have worsened. No pneumothorax is seen.     Impression: Worsening left lower lung infiltrates with stable infiltrates at the right base. Signer Name: Mirza Ruiz MD  Signed: 8/9/2021 5:20 AM  Workstation Name: [facility]  [facility]    XR Chest 1 View    Result Date: 8/7/2021  Narrative: CR Chest 1 Vw INDICATION: Endotracheal tube placement COMPARISON:  None available. FINDINGS: Single portable AP view(s) of the chest. The tip of the endotracheal tube is approximately 5 cm above the waldo. This is in satisfactory position. The heart and mediastinal contours are normal. The lungs demonstrate patchy airspace disease most prominent in the right lung base. No obvious pleural fluid. No pneumothorax.  IMPRESSION: Endotracheal tube in satisfactory position. Patchy bilateral airspace disease most prominent in the right lung base. Signer Name: Cole Mcconnell MD  Signed: 8/7/2021 9:58 PM  Workstation Name: [id number]  [facility]    XR Abdomen KUB    Result Date: 8/8/2021  Narrative: ABDOMEN, 8/8/2021 (08:20) HISTORY:  Feeding tube placement.  TECHNIQUE: AP portable radiograph of the upper abdomen.  FINDINGS: On the 2nd of two images, the Dobbhoff tube tip is in midportion of the stomach. Dense consolidation of the left lower lobe and patchy right base pulmonary infiltrate are visible.     Impression: Dobbhoff tube tip in the mid stomach. Signer Name: Truong Gustafson MD  Signed: 8/8/2021 9:01 AM  Workstation Name: [email or web address]  [facility]      Allergies:  is allergic to contrast dye, gabapentin, naproxen, nsaids, atorvastatin, cyclobenzaprine, and iodinated diagnostic  agents.    Josh  reviewed    Discharge Medications:     Discharge Medications      ASK your doctor about these medications      Instructions Start Date   atorvastatin 40 MG tablet  Commonly known as: LIPITOR   40 mg, Oral, Daily      baclofen 10 MG tablet  Commonly known as: LIORESAL   10 mg, Oral, 3 Times Daily      fluticasone 50 MCG/ACT nasal spray  Commonly known as: FLONASE   2 sprays, Nasal, Daily      lisinopril 20 MG tablet  Commonly known as: PRINIVIL,ZESTRIL   20 mg, Oral, Daily      metoprolol tartrate 50 MG tablet  Commonly known as: LOPRESSOR   50 mg, Oral, 2 Times Daily      pantoprazole 40 MG EC tablet  Commonly known as: PROTONIX   40 mg, Oral, Daily      QUEtiapine 100 MG tablet  Commonly known as: SEROquel   100 mg, Oral, Nightly             Last Lab Results:   Lab Results (most recent)     Procedure Component Value Units Date/Time    POC Glucose Once [[id number]]  (Abnormal) Collected: 08/14/21 0933    Specimen: Blood Updated: 08/14/21 0939     Glucose 139 mg/dL      Comment: Meter: [id number] : [id number] [facility] [employer]       Blood Gas, Arterial - [[id number]]  (Abnormal) Collected: 08/14/21 0930    Specimen: Arterial Blood Updated: 08/14/21 0939     Site Left Radial     Kris's Test Positive     pH, Arterial 7.222 pH units      Comment: 84 Value below reference range        pCO2, Arterial >102.0 mm Hg      Comment: 86 Value above critical limit        pO2, Arterial 49.7 mm Hg      Comment: 85 Value below critical limit        HCO3, Arterial 45.7 mmol/L      Comment: 83 Value above reference range        Base Excess, Arterial 12.7 mmol/L      Comment: 83 Value above reference range        O2 Saturation, Arterial 83.3 %      Comment: 84 Value below reference range        Hemoglobin, Blood Gas 14.4 g/dL      Temperature 37.0 C      Barometric Pressure for Blood Gas 743 mmHg      Modality Nasal Cannula     Flow Rate 1.0 lpm      Ventilator Mode NA     Notified Who RB AND V DR ERWIN      Notified By [id number]     Notified Time 08/14/2021 09:45     Collected by [id number]     Comment: Meter: [id number]     :  [id number]        pCO2, Temperature Corrected >102 mm Hg      pH, Temp Corrected 7.222 pH Units      pO2, Temperature Corrected 49.7 mm Hg     Blood Culture - Blood, Arm, Left [[id number]] Collected: 08/07/21 2243    Specimen: Blood from Arm, Left Updated: 08/12/21 2315     Blood Culture No growth at 5 days    Blood Culture - Blood, Arm, Left [[id number]] Collected: 08/07/21 2054    Specimen: Blood from Arm, Left Updated: 08/12/21 2116     Blood Culture No growth at 5 days    Respiratory Culture - Sputum, ET Suction [[id number]] Collected: 08/08/21 1337    Specimen: Sputum from ET Suction Updated: 08/11/21 1631     Respiratory Culture Scant growth (1+) Normal Respiratory Ena     Gram Stain Moderate (3+) WBCs seen      No epithelial cells seen      Rare (1+) Normal respiratory ena    Comprehensive Metabolic Panel [[id number]]  (Abnormal) Collected: 08/11/21 0344    Specimen: Blood Updated: 08/11/21 0446     Glucose 106 mg/dL      BUN 7 mg/dL      Creatinine 0.42 mg/dL      Sodium 137 mmol/L      Potassium 3.8 mmol/L      Chloride 94 mmol/L      CO2 38.9 mmol/L      Calcium 8.4 mg/dL      Total Protein 5.9 g/dL      Albumin 2.80 g/dL      ALT (SGPT) 20 U/L      AST (SGOT) 41 U/L      Alkaline Phosphatase 67 U/L      Total Bilirubin 0.6 mg/dL      eGFR Non African Amer >150 mL/min/1.73      Globulin 3.1 gm/dL      A/G Ratio 0.9 g/dL      BUN/Creatinine Ratio 16.7     Anion Gap 4.1 mmol/L     Narrative:      GFR Normal >60  Chronic Kidney Disease <60  Kidney Failure <15      CBC & Differential [[id number]]  (Abnormal) Collected: 08/11/21 0344    Specimen: Blood Updated: 08/11/21 0430    Narrative:      The following orders were created for panel order CBC & Differential.  Procedure                               Abnormality         Status                     ---------                                -----------         ------                     CBC Auto Differential[[id number]]        Abnormal            Final result                 Please view results for these tests on the individual orders.    CBC Auto Differential [[id number]]  (Abnormal) Collected: 08/11/21 0344    Specimen: Blood Updated: 08/11/21 0430     WBC 9.27 10*3/mm3      RBC 4.20 10*6/mm3      Hemoglobin 13.3 g/dL      Hematocrit 41.9 %      MCV 99.8 fL      MCH 31.7 pg      MCHC 31.7 g/dL      RDW 12.8 %      RDW-SD 46.5 fl      MPV 9.8 fL      Platelets 223 10*3/mm3      Neutrophil % 80.6 %      Lymphocyte % 10.4 %      Monocyte % 7.7 %      Eosinophil % 0.3 %      Basophil % 0.2 %      Immature Grans % 0.8 %      Neutrophils, Absolute 7.48 10*3/mm3      Lymphocytes, Absolute 0.96 10*3/mm3      Monocytes, Absolute 0.71 10*3/mm3      Eosinophils, Absolute 0.03 10*3/mm3      Basophils, Absolute 0.02 10*3/mm3      Immature Grans, Absolute 0.07 10*3/mm3      nRBC 0.0 /100 WBC     Respiratory Culture - Aspirate, ET Suction [[id number]] Collected: 08/08/21 0438    Specimen: Aspirate from ET Suction Updated: 08/10/21 1208     Respiratory Culture Scant growth (1+) Normal Respiratory Ena: NO S.aureus/MRSA or Pseudomonas aeruginosa     Gram Stain Many (4+) WBCs per low power field      Rare (1+) Gram positive cocci in pairs      Rare (1+) Normal respiratory ena    Blood Gas, Venous - [[id number]]  (Abnormal) Collected: 08/10/21 0845    Specimen: Venous Blood Updated: 08/10/21 0851     Site OTHER     pH, Venous 7.377 pH Units      pCO2, Venous 73.8 mm Hg      Comment: 83 Value above reference range        pO2, Venous 69.8 mm Hg      Comment: 83 Value above reference range        HCO3, Venous 43.4 mmol/L      Comment: 83 Value above reference range        Base Excess, Venous 14.5 mmol/L      Comment: 83 Value above reference range        O2 Saturation, Venous 95.1 %      Comment: 83 Value above reference range        Hemoglobin, Blood Gas 13.8 g/dL       Temperature 37.0 C      Barometric Pressure for Blood Gas 741 mmHg      Modality Ventilator     FIO2 40 %      Ventilator Mode PSV     PSV 10.0 cmH2O      Collected by [id number]     Comment: Meter: [id number]     :  [id number]       Procalcitonin [[id number]]  (Normal) Collected: 08/10/21 0424    Specimen: Blood Updated: 08/10/21 0530     Procalcitonin 0.02 ng/mL     Narrative:      Results may be falsely decreased if patient taking Biotin.     Renal Function Panel [[id number]]  (Abnormal) Collected: 08/10/21 0424    Specimen: Blood Updated: 08/10/21 0509     Glucose 133 mg/dL      BUN 10 mg/dL      Creatinine 0.40 mg/dL      Sodium 135 mmol/L      Potassium 4.3 mmol/L      Chloride 94 mmol/L      CO2 40.3 mmol/L      Calcium 8.2 mg/dL      Albumin 2.40 g/dL      Phosphorus 2.0 mg/dL      Anion Gap 0.7 mmol/L      BUN/Creatinine Ratio 25.0     eGFR Non African Amer >150 mL/min/1.73     Narrative:      GFR Normal >60  Chronic Kidney Disease <60  Kidney Failure <15      Blood Gas, Arterial - [[id number]]  (Abnormal) Collected: 08/10/21 0450    Specimen: Arterial Blood Updated: 08/10/21 0501     Site Right Radial     Kris's Test Positive     pH, Arterial 7.376 pH units      pCO2, Arterial 75.2 mm Hg      Comment: 86 Value above critical limit        pO2, Arterial 71.6 mm Hg      Comment: 84 Value below reference range        HCO3, Arterial 44.0 mmol/L      Comment: 83 Value above reference range        Base Excess, Arterial 15.3 mmol/L      Comment: 83 Value above reference range        O2 Saturation, Arterial 95.1 %      Hemoglobin, Blood Gas 12.7 g/dL      Temperature 37.0 C      Barometric Pressure for Blood Gas 740 mmHg      Modality Ventilator     FIO2 45 %      Ventilator Mode AC     Set Tidal Volume 400     Set Mech Resp Rate 22.0     PEEP 5.0     Notified Who DR CISNEROS READ BACK AND VERIFIED     Notified By [id number]     Notified Time 08/10/2021 05:03     Collected by [id number]     Comment: Meter:  [id number]     :  [id number]        pCO2, Temperature Corrected 75.2 mm Hg      pH, Temp Corrected 7.376 pH Units      pO2, Temperature Corrected 71.6 mm Hg     Vancomycin, Random [[id number]]  (Normal) Collected: 08/09/21 1330    Specimen: Blood Updated: 08/09/21 1402     Vancomycin Random 9.90 mcg/mL     Narrative:      Therapeutic Ranges for Vancomycin    Vancomycin Random   5.0-40.0 mcg/mL  Vancomycin Trough   5.0-20.0 mcg/mL  Vancomycin Peak     20.0-40.0 mcg/mL    MRSA Screen Culture (Outpatient) - Swab, Nares [[id number]]  (Normal) Collected: 08/08/21 0150    Specimen: Swab from Nares Updated: 08/09/21 1249     MRSA Screen Cx No Methicillin Resistant Staphylococcus aureus isolated    Respiratory Panel, PCR (WITHOUT COVID) - Swab, Nasopharynx [[id number]]  (Normal) Collected: 08/08/21 1952    Specimen: Swab from Nasopharynx Updated: 08/09/21 1128     ADENOVIRUS, PCR Not Detected     Coronavirus 229E Not Detected     Coronavirus HKU1 Not Detected     Coronavirus NL63 Not Detected     Coronavirus OC43 Not Detected     Human Metapneumovirus Not Detected     Human Rhinovirus/Enterovirus Not Detected     Influenza B PCR Not Detected     Parainfluenza Virus 1 Not Detected     Parainfluenza Virus 2 Not Detected     Parainfluenza Virus 3 Not Detected     Parainfluenza Virus 4 Not Detected     Bordetella pertussis pcr Not Detected     Chlamydophila pneumoniae PCR Not Detected     Mycoplasma pneumo by PCR Not Detected     Influenza A PCR Not Detected     RSV, PCR Not Detected     Bordetella parapertussis PCR Not Detected    Narrative:      The coronavirus on the RVP is NOT COVID-19 and is NOT indicative of infection with COVID-19.    In the setting of a positive respiratory panel with a viral infection PLUS a negative procalcitonin without other underlying concern for bacterial infection, consider observing off antibiotics or discontinuation of antibiotics and continue supportive care. If the respiratory panel  is positive for atypical bacterial infection (Bordetella pertussis, Chlamydophila pneumoniae, or Mycoplasma pneumoniae), consider antibiotic de-escalation to target atypical bacterial infection.    BNP [[id number]]  (Normal) Collected: 08/09/21 0419    Specimen: Blood Updated: 08/09/21 0554     proBNP 228.2 pg/mL     Narrative:      Among patients with dyspnea, NT-proBNP is highly sensitive for the detection of acute congestive heart failure. In addition NT-proBNP of <300 pg/ml effectively rules out acute congestive heart failure with 99% negative predictive value.    Results may be falsely decreased if patient taking Biotin.      Procalcitonin [[id number]]  (Normal) Collected: 08/09/21 0419    Specimen: Blood Updated: 08/09/21 0551     Procalcitonin 0.03 ng/mL     Narrative:      Results may be falsely decreased if patient taking Biotin.     Magnesium [[id number]]  (Normal) Collected: 08/09/21 0419    Specimen: Blood Updated: 08/09/21 0516     Magnesium 2.0 mg/dL     Comprehensive Metabolic Panel [[id number]]  (Abnormal) Collected: 08/09/21 0419    Specimen: Blood Updated: 08/09/21 0516     Glucose 150 mg/dL      BUN 13 mg/dL      Creatinine 0.52 mg/dL      Sodium 138 mmol/L      Potassium 4.7 mmol/L      Chloride 99 mmol/L      CO2 34.8 mmol/L      Calcium 8.4 mg/dL      Total Protein 5.5 g/dL      Albumin 2.60 g/dL      ALT (SGPT) 13 U/L      AST (SGOT) 20 U/L      Alkaline Phosphatase 68 U/L      Total Bilirubin 0.2 mg/dL      eGFR Non African Amer 122 mL/min/1.73      Globulin 2.9 gm/dL      A/G Ratio 0.9 g/dL      BUN/Creatinine Ratio 25.0     Anion Gap 4.2 mmol/L     Narrative:      GFR Normal >60  Chronic Kidney Disease <60  Kidney Failure <15      CBC & Differential [[id number]]  (Abnormal) Collected: 08/09/21 0419    Specimen: Blood Updated: 08/09/21 0513    Narrative:      The following orders were created for panel order CBC & Differential.  Procedure                               Abnormality         Status                      ---------                               -----------         ------                     CBC Auto Differential[[id number]]        Abnormal            Final result               Scan Slide[[id number]]                                       Final result                 Please view results for these tests on the individual orders.    Scan Slide [[id number]] Collected: 08/09/21 0419    Specimen: Blood Updated: 08/09/21 0513     Macrocytes Slight/1+     WBC Morphology Normal     Platelet Morphology Normal    CBC Auto Differential [[id number]]  (Abnormal) Collected: 08/09/21 0419    Specimen: Blood Updated: 08/09/21 0500     WBC 13.40 10*3/mm3      RBC 4.08 10*6/mm3      Hemoglobin 13.0 g/dL      Hematocrit 42.9 %      .1 fL      MCH 31.9 pg      MCHC 30.3 g/dL      RDW 12.9 %      RDW-SD 50.9 fl      MPV 9.8 fL      Platelets 203 10*3/mm3      Neutrophil % 86.3 %      Lymphocyte % 7.3 %      Monocyte % 5.7 %      Eosinophil % 0.0 %      Basophil % 0.1 %      Immature Grans % 0.6 %      Neutrophils, Absolute 11.57 10*3/mm3      Lymphocytes, Absolute 0.98 10*3/mm3      Monocytes, Absolute 0.76 10*3/mm3      Eosinophils, Absolute 0.00 10*3/mm3      Basophils, Absolute 0.01 10*3/mm3      Immature Grans, Absolute 0.08 10*3/mm3     Legionella Antigen, Urine - Urine, Urine, Clean Catch [[id number]]  (Normal) Collected: 08/08/21 1316    Specimen: Urine, Clean Catch Updated: 08/08/21 1346     LEGIONELLA ANTIGEN, URINE Negative    S. Pneumo Ag Urine or CSF - Urine, Urine, Clean Catch [[id number]]  (Normal) Collected: 08/08/21 1316    Specimen: Urine, Clean Catch Updated: 08/08/21 1346     Strep Pneumo Ag Negative    Urine Drug Screen - Urine, Catheter [[id number]]  (Normal) Collected: 08/08/21 1315    Specimen: Urine, Catheter Updated: 08/08/21 1335     THC, Screen, Urine Negative     Phencyclidine (PCP), Urine Negative     Cocaine Screen, Urine Negative     Methamphetamine, Ur Negative     Opiate Screen Negative      Amphetamine Screen, Urine Negative     Benzodiazepine Screen, Urine Negative     Tricyclic Antidepressants Screen Negative     Methadone Screen, Urine Negative     Barbiturates Screen, Urine Negative     Oxycodone Screen, Urine Negative     Propoxyphene Screen Negative     Buprenorphine, Screen, Urine Negative    Narrative:      Urine drug screen results are to be used for medical purposes only.  They are not to be used for legal purposes such as employment testing.  Negative results do not necessarily mean the complete absence of a subtance, but rather that the result is less than the cutoff for that substance.  Positive results are unconfirmed and considered Preliminary Positive.  [facility] does not automatically confirm Postitive Unconfirmed results.  The physician may request (order) an Unconfirmed Positive result to be sent out for confirmation.      Negative Thresholds for Drugs Screened:    THC screen, urine                          50 ng/ml  Phenycyclidine (PCP), urine                25 ng/ml  Cocaine screen, urine                     150 ng/ml  Methamphetamine, urine                    500 ng/ml  Opiate screen, urine                      100 ng/ml  Amphetamine screen, urine                 500 ng/ml  Benzodiazepine screen, urine              150 ng/ml  Tricyclic Antidepressants screen, urine   300 ng/ml  Methadone screen, urine                   200 ng/ml  Barbiturates screen, urine                200 ng/ml  Oxycodone screen, urine                   100 ng/ml  Propoxyphene screen, urine                300 ng/ml  Buprenorphine screen, urine                10 ng/ml    Phosphorus [[id number]]  (Normal) Collected: 08/08/21 0427    Specimen: Blood Updated: 08/08/21 0549     Phosphorus 3.5 mg/dL     Basic Metabolic Panel [[id number]]  (Abnormal) Collected: 08/08/21 0427    Specimen: Blood Updated: 08/08/21 0500     Glucose 99 mg/dL      BUN 20 mg/dL      Creatinine 0.62 mg/dL      Sodium 135 mmol/L       Potassium 5.1 mmol/L      Comment: Slight hemolysis detected by analyzer. Results may be affected.        Chloride 98 mmol/L      CO2 33.0 mmol/L      Calcium 8.6 mg/dL      eGFR Non African Amer 100 mL/min/1.73      BUN/Creatinine Ratio 32.3     Anion Gap 4.0 mmol/L     Narrative:      GFR Normal >60  Chronic Kidney Disease <60  Kidney Failure <15      Magnesium [[id number]]  (Normal) Collected: 08/08/21 0427    Specimen: Blood Updated: 08/08/21 0454     Magnesium 2.0 mg/dL     TSH [[id number]]  (Normal) Collected: 08/07/21 2052    Specimen: Blood Updated: 08/07/21 2138     TSH 0.278 uIU/mL     Hemoglobin A1c [[id number]]  (Abnormal) Collected: 08/07/21 2052    Specimen: Blood Updated: 08/07/21 2132     Hemoglobin A1C 5.70 %     Narrative:      Hemoglobin A1C Ranges:    Increased Risk for Diabetes  5.7% to 6.4%  Diabetes                     >= 6.5%  Diabetic Goal                < 7.0%    Basic Metabolic Panel [[id number]]  (Abnormal) Collected: 08/07/21 2052    Specimen: Blood Updated: 08/07/21 2126     Glucose 100 mg/dL      BUN 23 mg/dL      Creatinine 0.71 mg/dL      Sodium 136 mmol/L      Potassium 4.3 mmol/L      Chloride 100 mmol/L      CO2 31.8 mmol/L      Calcium 7.7 mg/dL      eGFR Non African Amer 85 mL/min/1.73      BUN/Creatinine Ratio 32.4     Anion Gap 4.2 mmol/L     Narrative:      GFR Normal >60  Chronic Kidney Disease <60  Kidney Failure <15          Imaging Results (Most Recent)     Procedure Component Value Units Date/Time    XR Chest 1 View [[id number]] Collected: 08/14/21 0643     Updated: 08/14/21 0646    Narrative:      CR Chest 1 Vw    INDICATION:   Pneumonia     COMPARISON:    8/9/2021    FINDINGS:  Single portable AP view(s) of the chest.        Since previous examination, aeration of the right lung base has improved. There is still dense consolidation at the left base, questionably improved. The upper lung fields are unchanged with a faint right upper lobe infiltrate again noted. The left  apex  is clear.       Impression:      There is improvement at the right base since the previous examination. The left lung base shows perhaps slight improvement.    Signer Name: Mirza Valdes MD   Signed: 8/14/2021 6:43 AM   Workstation Name: [id number]    [facility]    SCANNED - IMAGING [[id number]] Resulted: 08/07/21     Updated: 08/13/21 1113    MRI Cervical Spine Without Contrast [[id number]] Collected: 08/12/21 1411     Updated: 08/12/21 1413    Narrative:      MRI Spine Cervical WO    INDICATION:    Stroke, follow-up study. Prior stroke 5/12/2021. Right arm and leg numbness.    TECHNIQUE:   MRI of the cervical spine without IV contrast.    COMPARISON:    No pertinent prior study    The exam is compromised by significant motion artifact.    FINDINGS:  The sagittal alignment shows slight anterolisthesis of C4 on C5. Vertebral body heights are maintained. No evidence of marrow replacement or infiltration. The cervicomedullary junction is normal. The spinal cord is of normal course and caliber. No syrinx  is seen. No reproducible areas of signal abnormality identified.    At C2-C3. Disc desiccation and minimal loss of disc height. Mild facet arthropathy. No spinal canal or neural foraminal compromise.    At C3-C4, disc desiccation and severe loss of disc height. Moderate to severe bilateral facet arthropathy and uncovertebral hypertrophy. Broad-based central disc osteophyte complex. Effacement of the ventral thecal sac but no cord flattening. Mild to  moderate narrowing of the neural foramina bilaterally.    At C4-C5, disc desiccation and mild loss of disc height. Severe left facet arthropathy with moderate right facet arthropathy. Broad-based central disc osteophyte complex with uncovertebral hypertrophy. Effacement of the ventral thecal sac but no cord  flattening. Moderate to severe narrowing of the left neural foramen with mild compromise of the right neural foramen.    At C5-C6, disc  desiccation and severe loss of disc height. Broad-based central disc osteophyte complex slightly eccentric to the left. Uncovertebral hypertrophy and facet arthropathy. Effacement of the ventral thecal sac with mild flattening of the left  side of the spinal cord. There is moderate narrowing of the neural foramina bilaterally.    At C6-C7, disc desiccation and moderate loss of disc height. There is a focal left-sided disc osteophyte complex with effacement of the ventral thecal sac and flattening of the left side of the spinal cord. Facet arthropathy and uncovertebral hypertrophy  with moderate compromise of the left neural foramen and mild narrowing of the right neural foramen.    At C7-T1, disc desiccation and severe loss of disc height. Focal disc osteophyte complex eccentric to the left with effacement of the ventral thecal sac. No definite cord flattening. Moderate narrowing of the left neural foramen. Mild narrowing of the  right neural foramen. Moderate facet arthropathy.    Paravertebral soft tissues demonstrate no acute findings.      Impression:      Severe cervical spondylosis as described.    Please see the above report for a complete description of the individual levels.    Signer Name: Cole Mcconnell MD   Signed: 8/12/2021 2:11 PM   Workstation Name: [id number]    [facility]    MRI Brain Without Contrast [[id number]] Collected: 08/12/21 1235     Updated: 08/12/21 1238    Narrative:          MRI Brain WO     Clinical history: Follow-up stroke. Stroke 5/12/2021.    Comparison: No pertinent prior study.    Technique: Sagittal, axial and coronal images of the head were obtained using the standard protocol.    Findings:  The diffusion sequence demonstrates no evidence of restricted diffusion to indicate acute infarction. The gradient echo sequence shows no abnormal susceptibility artifact.    The FLAIR sequence shows the ventricles to be of normal size and configuration. No extra-axial  fluid collections. No significant shift of midline structures. There are scattered areas of FLAIR hyperintensity demonstrated in the deep white matter and  subcortical white matter of the cerebral hemispheres. This is nonspecific but likely represents microvascular disease in this patient.    Also demonstrated is a linear area of FLAIR hyperintensity extending from the right temporal lobe toward the mid body of the right lateral ventricle. This likely represents a developmental venous anomaly. Contrast-enhancement could provide additional  information.    The pituitary gland is within normal limits. The cervicomedullary junction is normal. The 7th and 8th cranial nerve complexes are within normal limits.    There are trace bilateral mastoid effusions. Paranasal sinuses show prominent inflammatory change in the sphenoid sinus. There are mild inflammatory changes demonstrated in the ethmoid air cells and in the right maxillary sinus. No acute orbital  findings.      Impression:      Impression:  1.  No evidence of restricted diffusion to indicate acute infarction.    2.  Scattered areas of FLAIR hyperintensity within the deep white matter and subcortical white matter of the cerebral hemispheres likely representing microvascular disease in this patient.    3.  Linear area of FLAIR hyperintensity extending from the right temporal lobe toward the mid body of the right lateral ventricle. This likely represents a developmental venous anomaly.    4.  Trace bilateral mastoid effusions. Prominent inflammatory change in the sphenoid sinus with mild inflammatory change in the ethmoid air cells and right maxillary sinus.        Signer Name: Cole Mcconnell MD   Signed: 8/12/2021 12:35 PM   Workstation Name: [id number]    [facility]    XR Chest 1 View [[id number]] Collected: 08/09/21 0520     Updated: 08/09/21 0522    Narrative:      CR Chest 1 Vw    INDICATION:   Respiratory failure. Follow-up.      COMPARISON:    8/7/2021    FINDINGS:  Single portable AP view(s) of the chest.    Endotracheal tube is in the mid trachea. Heart size remains normal. Infiltrates at the right lung base are not significantly changed. Infiltrates at the left lung base have worsened. No pneumothorax is seen.       Impression:      Worsening left lower lung infiltrates with stable infiltrates at the right base.    Signer Name: Mirza Ruiz MD   Signed: 8/9/2021 5:20 AM   Workstation Name: [facility]    [facility]    XR Abdomen KUB [[id number]] Collected: 08/08/21 0901     Updated: 08/08/21 0903    Narrative:      ABDOMEN, 8/8/2021 (08:20)    HISTORY:    Feeding tube placement.      TECHNIQUE:  AP portable radiograph of the upper abdomen.      FINDINGS:  On the 2nd of two images, the Dobbhoff tube tip is in midportion of the stomach.    Dense consolidation of the left lower lobe and patchy right base pulmonary infiltrate are visible.      Impression:      Dobbhoff tube tip in the mid stomach.    Signer Name: Truong Gustafson MD   Signed: 8/8/2021 9:01 AM   Workstation Name: [id number]    [facility]    XR Chest 1 View [[id number]] Collected: 08/07/21 2158     Updated: 08/07/21 2202    Narrative:      CR Chest 1 Vw    INDICATION:   Endotracheal tube placement     COMPARISON:    None available.    FINDINGS:  Single portable AP view(s) of the chest.    The tip of the endotracheal tube is approximately 5 cm above the waldo. This is in satisfactory position.    The heart and mediastinal contours are normal.    The lungs demonstrate patchy airspace disease most prominent in the right lung base. No obvious pleural fluid. No pneumothorax.     IMPRESSION:  Endotracheal tube in satisfactory position.    Patchy bilateral airspace disease most prominent in the right lung base.    Signer Name: Cole Mcconnell MD   Signed: 8/7/2021 9:58 PM   Workstation Name: [id number]    [facility]          PROCEDURES None      Condition on Discharge:  Stable, Improved.     Physical Exam at Discharge  Vital Signs       Physical Exam  On exam the patient did not respond to verbal or physical stimuli. I found her to have absent heart and breath sounds.  Absent peripheral pulses. Pupils are fixed and dilated.     Discharge Disposition  To [facility] [place]         Follow-up Appointments  No future appointments.      Test Results Pending at Discharge     Time spent: 28 minutes.     Electronically signed by Jhon Mcgill DO at 08/16/21 3619